# Patient Record
Sex: MALE | Race: WHITE | NOT HISPANIC OR LATINO | Employment: OTHER | URBAN - METROPOLITAN AREA
[De-identification: names, ages, dates, MRNs, and addresses within clinical notes are randomized per-mention and may not be internally consistent; named-entity substitution may affect disease eponyms.]

---

## 2020-08-02 ENCOUNTER — APPOINTMENT (OUTPATIENT)
Dept: GENERAL RADIOLOGY | Facility: HOSPITAL | Age: 70
End: 2020-08-02

## 2020-08-02 ENCOUNTER — HOSPITAL ENCOUNTER (OUTPATIENT)
Facility: HOSPITAL | Age: 70
Setting detail: OBSERVATION
Discharge: HOME | End: 2020-08-03
Attending: EMERGENCY MEDICINE | Admitting: INTERNAL MEDICINE

## 2020-08-02 DIAGNOSIS — R53.1 GENERALIZED WEAKNESS: Primary | ICD-10-CM

## 2020-08-02 DIAGNOSIS — R07.9 CHEST PAIN IN ADULT: ICD-10-CM

## 2020-08-02 DIAGNOSIS — Z86.79 HISTORY OF HYPERTENSION: ICD-10-CM

## 2020-08-02 DIAGNOSIS — E66.9 DIABETES MELLITUS TYPE 2 IN OBESE (HCC): ICD-10-CM

## 2020-08-02 DIAGNOSIS — Z86.79 HISTORY OF CORONARY ARTERY DISEASE: ICD-10-CM

## 2020-08-02 DIAGNOSIS — R06.09 DOE (DYSPNEA ON EXERTION): ICD-10-CM

## 2020-08-02 DIAGNOSIS — E11.69 DIABETES MELLITUS TYPE 2 IN OBESE (HCC): ICD-10-CM

## 2020-08-02 PROBLEM — M54.9 CHRONIC BACK PAIN: Status: ACTIVE | Noted: 2020-08-02

## 2020-08-02 PROBLEM — I25.9 CHEST PAIN DUE TO MYOCARDIAL ISCHEMIA: Status: ACTIVE | Noted: 2020-08-02

## 2020-08-02 PROBLEM — E11.9 DIABETES MELLITUS (HCC): Status: ACTIVE | Noted: 2020-08-02

## 2020-08-02 PROBLEM — I25.10 CORONARY ARTERY DISEASE: Status: ACTIVE | Noted: 2020-08-02

## 2020-08-02 PROBLEM — G89.29 CHRONIC BACK PAIN: Status: ACTIVE | Noted: 2020-08-02

## 2020-08-02 PROBLEM — I10 HYPERTENSION: Status: ACTIVE | Noted: 2020-08-02

## 2020-08-02 PROBLEM — E87.6 HYPOKALEMIA: Status: ACTIVE | Noted: 2020-08-02

## 2020-08-02 LAB
ALBUMIN SERPL-MCNC: 4.2 G/DL (ref 3.5–5.2)
ALBUMIN/GLOB SERPL: 1.5 G/DL
ALP SERPL-CCNC: 102 U/L (ref 39–117)
ALT SERPL W P-5'-P-CCNC: 20 U/L (ref 1–41)
ANION GAP SERPL CALCULATED.3IONS-SCNC: 12 MMOL/L (ref 5–15)
AST SERPL-CCNC: 17 U/L (ref 1–40)
BASOPHILS # BLD AUTO: 0.04 10*3/MM3 (ref 0–0.2)
BASOPHILS NFR BLD AUTO: 0.9 % (ref 0–1.5)
BILIRUB SERPL-MCNC: 0.4 MG/DL (ref 0–1.2)
BILIRUB UR QL STRIP: NEGATIVE
BUN SERPL-MCNC: 8 MG/DL (ref 8–23)
BUN/CREAT SERPL: 8.3 (ref 7–25)
CALCIUM SPEC-SCNC: 8.9 MG/DL (ref 8.6–10.5)
CHLORIDE SERPL-SCNC: 100 MMOL/L (ref 98–107)
CK SERPL-CCNC: 56 U/L (ref 20–200)
CLARITY UR: CLEAR
CO2 SERPL-SCNC: 25 MMOL/L (ref 22–29)
COLOR UR: YELLOW
CREAT SERPL-MCNC: 0.96 MG/DL (ref 0.76–1.27)
CRP SERPL-MCNC: 0.27 MG/DL (ref 0–0.5)
DEPRECATED RDW RBC AUTO: 40.6 FL (ref 37–54)
EOSINOPHIL # BLD AUTO: 0.28 10*3/MM3 (ref 0–0.4)
EOSINOPHIL NFR BLD AUTO: 6.2 % (ref 0.3–6.2)
ERYTHROCYTE [DISTWIDTH] IN BLOOD BY AUTOMATED COUNT: 12.4 % (ref 12.3–15.4)
ERYTHROCYTE [SEDIMENTATION RATE] IN BLOOD: 11 MM/HR (ref 0–20)
GFR SERPL CREATININE-BSD FRML MDRD: 77 ML/MIN/1.73
GLOBULIN UR ELPH-MCNC: 2.8 GM/DL
GLUCOSE BLDC GLUCOMTR-MCNC: 152 MG/DL (ref 70–130)
GLUCOSE BLDC GLUCOMTR-MCNC: 202 MG/DL (ref 70–130)
GLUCOSE SERPL-MCNC: 154 MG/DL (ref 65–99)
GLUCOSE UR STRIP-MCNC: ABNORMAL MG/DL
HBA1C MFR BLD: 7.9 % (ref 4.8–5.6)
HCT VFR BLD AUTO: 45.2 % (ref 37.5–51)
HGB BLD-MCNC: 15.3 G/DL (ref 13–17.7)
HGB UR QL STRIP.AUTO: NEGATIVE
HOLD SPECIMEN: NORMAL
HOLD SPECIMEN: NORMAL
IMM GRANULOCYTES # BLD AUTO: 0.01 10*3/MM3 (ref 0–0.05)
IMM GRANULOCYTES NFR BLD AUTO: 0.2 % (ref 0–0.5)
KETONES UR QL STRIP: NEGATIVE
LEUKOCYTE ESTERASE UR QL STRIP.AUTO: NEGATIVE
LYMPHOCYTES # BLD AUTO: 1.17 10*3/MM3 (ref 0.7–3.1)
LYMPHOCYTES NFR BLD AUTO: 25.9 % (ref 19.6–45.3)
MAGNESIUM SERPL-MCNC: 2.1 MG/DL (ref 1.6–2.4)
MCH RBC QN AUTO: 30.1 PG (ref 26.6–33)
MCHC RBC AUTO-ENTMCNC: 33.8 G/DL (ref 31.5–35.7)
MCV RBC AUTO: 88.8 FL (ref 79–97)
MONOCYTES # BLD AUTO: 0.59 10*3/MM3 (ref 0.1–0.9)
MONOCYTES NFR BLD AUTO: 13.1 % (ref 5–12)
NEUTROPHILS NFR BLD AUTO: 2.43 10*3/MM3 (ref 1.7–7)
NEUTROPHILS NFR BLD AUTO: 53.7 % (ref 42.7–76)
NITRITE UR QL STRIP: NEGATIVE
NRBC BLD AUTO-RTO: 0 /100 WBC (ref 0–0.2)
PH UR STRIP.AUTO: 6 [PH] (ref 5–8)
PLATELET # BLD AUTO: 213 10*3/MM3 (ref 140–450)
PMV BLD AUTO: 10.4 FL (ref 6–12)
POTASSIUM SERPL-SCNC: 3.4 MMOL/L (ref 3.5–5.2)
PROT SERPL-MCNC: 7 G/DL (ref 6–8.5)
PROT UR QL STRIP: NEGATIVE
RBC # BLD AUTO: 5.09 10*6/MM3 (ref 4.14–5.8)
SARS-COV-2 RDRP RESP QL NAA+PROBE: NOT DETECTED
SODIUM SERPL-SCNC: 137 MMOL/L (ref 136–145)
SP GR UR STRIP: <=1.005 (ref 1–1.03)
TROPONIN T SERPL-MCNC: <0.01 NG/ML (ref 0–0.03)
TROPONIN T SERPL-MCNC: <0.01 NG/ML (ref 0–0.03)
UROBILINOGEN UR QL STRIP: ABNORMAL
WBC # BLD AUTO: 4.52 10*3/MM3 (ref 3.4–10.8)
WHOLE BLOOD HOLD SPECIMEN: NORMAL
WHOLE BLOOD HOLD SPECIMEN: NORMAL

## 2020-08-02 PROCEDURE — 94640 AIRWAY INHALATION TREATMENT: CPT

## 2020-08-02 PROCEDURE — 93005 ELECTROCARDIOGRAM TRACING: CPT

## 2020-08-02 PROCEDURE — 99284 EMERGENCY DEPT VISIT MOD MDM: CPT

## 2020-08-02 PROCEDURE — 63710000001 INSULIN DETEMIR PER 5 UNITS: Performed by: NURSE PRACTITIONER

## 2020-08-02 PROCEDURE — G0378 HOSPITAL OBSERVATION PER HR: HCPCS

## 2020-08-02 PROCEDURE — 85025 COMPLETE CBC W/AUTO DIFF WBC: CPT

## 2020-08-02 PROCEDURE — 96360 HYDRATION IV INFUSION INIT: CPT

## 2020-08-02 PROCEDURE — 96372 THER/PROPH/DIAG INJ SC/IM: CPT

## 2020-08-02 PROCEDURE — 80053 COMPREHEN METABOLIC PANEL: CPT

## 2020-08-02 PROCEDURE — 86140 C-REACTIVE PROTEIN: CPT | Performed by: EMERGENCY MEDICINE

## 2020-08-02 PROCEDURE — 25010000002 HEPARIN (PORCINE) PER 1000 UNITS: Performed by: NURSE PRACTITIONER

## 2020-08-02 PROCEDURE — 82550 ASSAY OF CK (CPK): CPT | Performed by: EMERGENCY MEDICINE

## 2020-08-02 PROCEDURE — 71045 X-RAY EXAM CHEST 1 VIEW: CPT

## 2020-08-02 PROCEDURE — 85652 RBC SED RATE AUTOMATED: CPT | Performed by: EMERGENCY MEDICINE

## 2020-08-02 PROCEDURE — 84484 ASSAY OF TROPONIN QUANT: CPT | Performed by: NURSE PRACTITIONER

## 2020-08-02 PROCEDURE — 96361 HYDRATE IV INFUSION ADD-ON: CPT

## 2020-08-02 PROCEDURE — 83735 ASSAY OF MAGNESIUM: CPT

## 2020-08-02 PROCEDURE — 87635 SARS-COV-2 COVID-19 AMP PRB: CPT | Performed by: NURSE PRACTITIONER

## 2020-08-02 PROCEDURE — 94799 UNLISTED PULMONARY SVC/PX: CPT

## 2020-08-02 PROCEDURE — 82962 GLUCOSE BLOOD TEST: CPT

## 2020-08-02 PROCEDURE — 99220 PR INITIAL OBSERVATION CARE/DAY 70 MINUTES: CPT | Performed by: NURSE PRACTITIONER

## 2020-08-02 PROCEDURE — 84484 ASSAY OF TROPONIN QUANT: CPT

## 2020-08-02 PROCEDURE — C9803 HOPD COVID-19 SPEC COLLECT: HCPCS

## 2020-08-02 PROCEDURE — 83036 HEMOGLOBIN GLYCOSYLATED A1C: CPT | Performed by: NURSE PRACTITIONER

## 2020-08-02 PROCEDURE — 81003 URINALYSIS AUTO W/O SCOPE: CPT

## 2020-08-02 RX ORDER — OMEPRAZOLE 40 MG/1
40 CAPSULE, DELAYED RELEASE ORAL DAILY
COMMUNITY
End: 2022-11-30 | Stop reason: HOSPADM

## 2020-08-02 RX ORDER — ACETAMINOPHEN 325 MG/1
650 TABLET ORAL EVERY 6 HOURS PRN
Status: DISCONTINUED | OUTPATIENT
Start: 2020-08-02 | End: 2020-08-03 | Stop reason: HOSPADM

## 2020-08-02 RX ORDER — AMLODIPINE BESYLATE 5 MG/1
10 TABLET ORAL DAILY
Status: DISCONTINUED | OUTPATIENT
Start: 2020-08-02 | End: 2020-08-03 | Stop reason: HOSPADM

## 2020-08-02 RX ORDER — ALBUTEROL SULFATE 90 UG/1
2 AEROSOL, METERED RESPIRATORY (INHALATION) EVERY 4 HOURS PRN
COMMUNITY

## 2020-08-02 RX ORDER — SIMETHICONE 80 MG
80 TABLET,CHEWABLE ORAL EVERY 6 HOURS PRN
Status: DISCONTINUED | OUTPATIENT
Start: 2020-08-02 | End: 2020-08-03 | Stop reason: HOSPADM

## 2020-08-02 RX ORDER — ESCITALOPRAM OXALATE 10 MG/1
20 TABLET ORAL DAILY
Status: DISCONTINUED | OUTPATIENT
Start: 2020-08-02 | End: 2020-08-03 | Stop reason: HOSPADM

## 2020-08-02 RX ORDER — DEXTROSE MONOHYDRATE 25 G/50ML
25 INJECTION, SOLUTION INTRAVENOUS
Status: DISCONTINUED | OUTPATIENT
Start: 2020-08-02 | End: 2020-08-03 | Stop reason: HOSPADM

## 2020-08-02 RX ORDER — LORAZEPAM 2 MG/1
2 TABLET ORAL 2 TIMES DAILY
COMMUNITY

## 2020-08-02 RX ORDER — DOXEPIN HYDROCHLORIDE 100 MG/1
100 CAPSULE ORAL NIGHTLY
Status: ON HOLD | COMMUNITY
End: 2022-11-30

## 2020-08-02 RX ORDER — TAMSULOSIN HYDROCHLORIDE 0.4 MG/1
0.4 CAPSULE ORAL NIGHTLY
Status: DISCONTINUED | OUTPATIENT
Start: 2020-08-02 | End: 2020-08-03 | Stop reason: HOSPADM

## 2020-08-02 RX ORDER — MONTELUKAST SODIUM 10 MG/1
10 TABLET ORAL NIGHTLY
COMMUNITY

## 2020-08-02 RX ORDER — ASPIRIN 81 MG/1
81 TABLET ORAL DAILY
Status: DISCONTINUED | OUTPATIENT
Start: 2020-08-02 | End: 2020-08-03 | Stop reason: HOSPADM

## 2020-08-02 RX ORDER — HEPARIN SODIUM 5000 [USP'U]/ML
5000 INJECTION, SOLUTION INTRAVENOUS; SUBCUTANEOUS EVERY 8 HOURS SCHEDULED
Status: DISCONTINUED | OUTPATIENT
Start: 2020-08-02 | End: 2020-08-03 | Stop reason: HOSPADM

## 2020-08-02 RX ORDER — BUDESONIDE AND FORMOTEROL FUMARATE DIHYDRATE 160; 4.5 UG/1; UG/1
2 AEROSOL RESPIRATORY (INHALATION)
Status: DISCONTINUED | OUTPATIENT
Start: 2020-08-02 | End: 2020-08-03 | Stop reason: HOSPADM

## 2020-08-02 RX ORDER — GABAPENTIN 300 MG/1
300 CAPSULE ORAL 3 TIMES DAILY
COMMUNITY
End: 2021-10-26

## 2020-08-02 RX ORDER — NITROGLYCERIN 0.4 MG/1
0.4 TABLET SUBLINGUAL
Status: DISCONTINUED | OUTPATIENT
Start: 2020-08-02 | End: 2020-08-03 | Stop reason: HOSPADM

## 2020-08-02 RX ORDER — MONTELUKAST SODIUM 10 MG/1
10 TABLET ORAL NIGHTLY
Status: DISCONTINUED | OUTPATIENT
Start: 2020-08-02 | End: 2020-08-03 | Stop reason: HOSPADM

## 2020-08-02 RX ORDER — CETIRIZINE HYDROCHLORIDE 10 MG/1
10 TABLET ORAL DAILY
COMMUNITY
End: 2020-08-02

## 2020-08-02 RX ORDER — LEVOCETIRIZINE DIHYDROCHLORIDE 5 MG/1
5 TABLET, FILM COATED ORAL EVERY EVENING
COMMUNITY
End: 2021-06-14

## 2020-08-02 RX ORDER — SODIUM CHLORIDE 0.9 % (FLUSH) 0.9 %
10 SYRINGE (ML) INJECTION AS NEEDED
Status: DISCONTINUED | OUTPATIENT
Start: 2020-08-02 | End: 2020-08-03 | Stop reason: HOSPADM

## 2020-08-02 RX ORDER — PANTOPRAZOLE SODIUM 40 MG/1
40 TABLET, DELAYED RELEASE ORAL EVERY MORNING
Status: DISCONTINUED | OUTPATIENT
Start: 2020-08-03 | End: 2020-08-03 | Stop reason: HOSPADM

## 2020-08-02 RX ORDER — LISINOPRIL 40 MG/1
40 TABLET ORAL DAILY
COMMUNITY
End: 2022-06-16 | Stop reason: ALTCHOICE

## 2020-08-02 RX ORDER — POTASSIUM CHLORIDE 1.5 G/1.77G
40 POWDER, FOR SOLUTION ORAL AS NEEDED
Status: DISCONTINUED | OUTPATIENT
Start: 2020-08-02 | End: 2020-08-03 | Stop reason: HOSPADM

## 2020-08-02 RX ORDER — NICOTINE POLACRILEX 4 MG
15 LOZENGE BUCCAL
Status: DISCONTINUED | OUTPATIENT
Start: 2020-08-02 | End: 2020-08-03 | Stop reason: HOSPADM

## 2020-08-02 RX ORDER — SODIUM CHLORIDE 9 MG/ML
75 INJECTION, SOLUTION INTRAVENOUS CONTINUOUS
Status: DISCONTINUED | OUTPATIENT
Start: 2020-08-02 | End: 2020-08-03 | Stop reason: HOSPADM

## 2020-08-02 RX ORDER — ASPIRIN 81 MG/1
81 TABLET ORAL DAILY
COMMUNITY

## 2020-08-02 RX ORDER — SODIUM CHLORIDE 0.9 % (FLUSH) 0.9 %
10 SYRINGE (ML) INJECTION EVERY 12 HOURS SCHEDULED
Status: DISCONTINUED | OUTPATIENT
Start: 2020-08-02 | End: 2020-08-03 | Stop reason: HOSPADM

## 2020-08-02 RX ORDER — ALBUTEROL SULFATE 2.5 MG/3ML
2.5 SOLUTION RESPIRATORY (INHALATION) EVERY 4 HOURS PRN
Status: DISCONTINUED | OUTPATIENT
Start: 2020-08-02 | End: 2020-08-03 | Stop reason: HOSPADM

## 2020-08-02 RX ORDER — LORAZEPAM 1 MG/1
2 TABLET ORAL 3 TIMES DAILY
Status: DISCONTINUED | OUTPATIENT
Start: 2020-08-02 | End: 2020-08-03 | Stop reason: HOSPADM

## 2020-08-02 RX ORDER — KETOTIFEN FUMARATE 0.35 MG/ML
1 SOLUTION/ DROPS OPHTHALMIC 2 TIMES DAILY
COMMUNITY

## 2020-08-02 RX ORDER — ESCITALOPRAM OXALATE 20 MG/1
20 TABLET ORAL DAILY
COMMUNITY

## 2020-08-02 RX ORDER — CLOPIDOGREL BISULFATE 75 MG/1
75 TABLET ORAL DAILY
COMMUNITY
End: 2021-09-22 | Stop reason: SDUPTHER

## 2020-08-02 RX ORDER — NITROGLYCERIN 0.4 MG/1
0.4 TABLET SUBLINGUAL
COMMUNITY

## 2020-08-02 RX ORDER — CETIRIZINE HYDROCHLORIDE 10 MG/1
10 TABLET ORAL DAILY
Status: DISCONTINUED | OUTPATIENT
Start: 2020-08-02 | End: 2020-08-03 | Stop reason: HOSPADM

## 2020-08-02 RX ORDER — DOCUSATE SODIUM 250 MG
250 CAPSULE ORAL DAILY PRN
COMMUNITY
End: 2020-12-30

## 2020-08-02 RX ORDER — KETOTIFEN FUMARATE 0.35 MG/ML
1 SOLUTION/ DROPS OPHTHALMIC 2 TIMES DAILY
Status: DISCONTINUED | OUTPATIENT
Start: 2020-08-02 | End: 2020-08-03 | Stop reason: HOSPADM

## 2020-08-02 RX ORDER — TAMSULOSIN HYDROCHLORIDE 0.4 MG/1
1 CAPSULE ORAL DAILY
COMMUNITY
End: 2021-09-22 | Stop reason: SDUPTHER

## 2020-08-02 RX ORDER — TRIAMCINOLONE ACETONIDE 1 MG/G
CREAM TOPICAL 2 TIMES DAILY
Status: ON HOLD | COMMUNITY
End: 2022-11-30

## 2020-08-02 RX ORDER — DOXEPIN HYDROCHLORIDE 25 MG/1
100 CAPSULE ORAL NIGHTLY
Status: DISCONTINUED | OUTPATIENT
Start: 2020-08-02 | End: 2020-08-03 | Stop reason: HOSPADM

## 2020-08-02 RX ORDER — LISINOPRIL 40 MG/1
40 TABLET ORAL DAILY
Status: DISCONTINUED | OUTPATIENT
Start: 2020-08-02 | End: 2020-08-03 | Stop reason: HOSPADM

## 2020-08-02 RX ORDER — ALBUTEROL SULFATE 90 UG/1
2 AEROSOL, METERED RESPIRATORY (INHALATION) EVERY 4 HOURS PRN
Status: DISCONTINUED | OUTPATIENT
Start: 2020-08-02 | End: 2020-08-02

## 2020-08-02 RX ORDER — DOCUSATE SODIUM 100 MG/1
100 CAPSULE, LIQUID FILLED ORAL DAILY PRN
Status: DISCONTINUED | OUTPATIENT
Start: 2020-08-02 | End: 2020-08-03 | Stop reason: HOSPADM

## 2020-08-02 RX ORDER — BUDESONIDE AND FORMOTEROL FUMARATE DIHYDRATE 160; 4.5 UG/1; UG/1
2 AEROSOL RESPIRATORY (INHALATION)
COMMUNITY
End: 2021-09-22 | Stop reason: SDUPTHER

## 2020-08-02 RX ORDER — SIMETHICONE 80 MG
80 TABLET,CHEWABLE ORAL EVERY 6 HOURS PRN
Status: ON HOLD | COMMUNITY
End: 2022-11-30

## 2020-08-02 RX ORDER — TADALAFIL 20 MG/1
20 TABLET ORAL DAILY PRN
COMMUNITY
End: 2020-12-28 | Stop reason: SDUPTHER

## 2020-08-02 RX ORDER — GABAPENTIN 300 MG/1
300 CAPSULE ORAL 3 TIMES DAILY
Status: DISCONTINUED | OUTPATIENT
Start: 2020-08-02 | End: 2020-08-03 | Stop reason: HOSPADM

## 2020-08-02 RX ORDER — POTASSIUM CHLORIDE 750 MG/1
40 CAPSULE, EXTENDED RELEASE ORAL AS NEEDED
Status: DISCONTINUED | OUTPATIENT
Start: 2020-08-02 | End: 2020-08-03 | Stop reason: HOSPADM

## 2020-08-02 RX ORDER — CELECOXIB 100 MG/1
75 CAPSULE ORAL DAILY
COMMUNITY
End: 2021-04-05

## 2020-08-02 RX ORDER — AMLODIPINE BESYLATE 10 MG/1
10 TABLET ORAL DAILY
COMMUNITY
End: 2022-05-26 | Stop reason: ALTCHOICE

## 2020-08-02 RX ORDER — CLOPIDOGREL BISULFATE 75 MG/1
75 TABLET ORAL DAILY
Status: DISCONTINUED | OUTPATIENT
Start: 2020-08-02 | End: 2020-08-03 | Stop reason: HOSPADM

## 2020-08-02 RX ADMIN — METOPROLOL TARTRATE 25 MG: 25 TABLET, FILM COATED ORAL at 21:02

## 2020-08-02 RX ADMIN — TAMSULOSIN HYDROCHLORIDE 0.4 MG: 0.4 CAPSULE ORAL at 21:02

## 2020-08-02 RX ADMIN — BUDESONIDE AND FORMOTEROL FUMARATE DIHYDRATE 2 PUFF: 160; 4.5 AEROSOL RESPIRATORY (INHALATION) at 19:37

## 2020-08-02 RX ADMIN — LISINOPRIL 40 MG: 40 TABLET ORAL at 21:02

## 2020-08-02 RX ADMIN — LORAZEPAM 2 MG: 1 TABLET ORAL at 21:02

## 2020-08-02 RX ADMIN — SODIUM CHLORIDE 75 ML/HR: 9 INJECTION, SOLUTION INTRAVENOUS at 18:18

## 2020-08-02 RX ADMIN — GABAPENTIN 300 MG: 300 CAPSULE ORAL at 21:03

## 2020-08-02 RX ADMIN — DOXEPIN HYDROCHLORIDE 100 MG: 25 CAPSULE ORAL at 21:02

## 2020-08-02 RX ADMIN — CLOPIDOGREL BISULFATE 75 MG: 75 TABLET ORAL at 21:02

## 2020-08-02 RX ADMIN — KETOTIFEN FUMARATE 1 DROP: 0.35 SOLUTION/ DROPS OPHTHALMIC at 21:03

## 2020-08-02 RX ADMIN — HEPARIN SODIUM 5000 UNITS: 5000 INJECTION INTRAVENOUS; SUBCUTANEOUS at 21:03

## 2020-08-02 RX ADMIN — SODIUM CHLORIDE, PRESERVATIVE FREE 10 ML: 5 INJECTION INTRAVENOUS at 21:03

## 2020-08-02 RX ADMIN — CETIRIZINE HYDROCHLORIDE 10 MG: 10 TABLET, FILM COATED ORAL at 21:02

## 2020-08-02 RX ADMIN — MONTELUKAST SODIUM 10 MG: 10 TABLET, COATED ORAL at 21:02

## 2020-08-02 RX ADMIN — ACETAMINOPHEN 650 MG: 325 TABLET, FILM COATED ORAL at 16:40

## 2020-08-02 RX ADMIN — INSULIN DETEMIR 15 UNITS: 100 INJECTION, SOLUTION SUBCUTANEOUS at 21:03

## 2020-08-02 RX ADMIN — POTASSIUM CHLORIDE 40 MEQ: 10 CAPSULE, COATED, EXTENDED RELEASE ORAL at 21:40

## 2020-08-02 NOTE — H&P
[facility]  HISTORY AND PHYSICAL    Patient Name: Panda Henry  : 1950  MRN: [id number]  Primary Care Physician: Alan Foy DO  Date of admission: 2020      Subjective   Subjective     Chief Complaint:  Chest pain  Weakness    HPI:  Panda Henry is a 70 y.o. male who presented to the ED with 2 weeks of progressive weakness and fatigue in upper and lower extremities.  He was so weak today that he was unable to get out of bathtub.  He also complains of chest pain with UREÑA.  He has history of CAD with stent placement 2019.      Review of Systems   Constitutional: Positive for activity change and fatigue. Negative for appetite change and fever.   HENT: Negative for hearing loss.    Eyes: Negative for visual disturbance.   Respiratory: Positive for shortness of breath.    Cardiovascular: Positive for chest pain. Negative for leg swelling.   Gastrointestinal: Negative for abdominal pain, constipation, diarrhea, nausea and vomiting.   Genitourinary: Negative for dysuria and hematuria.   Musculoskeletal: Positive for arthralgias, back pain and myalgias. Negative for joint swelling.   Skin: Negative for wound.   Neurological: Positive for weakness and headaches. Negative for dizziness and syncope.   Psychiatric/Behavioral: Negative for confusion.     All other systems reviewed and are negative.     Personal History     Past Medical History:   Diagnosis Date   • Cancer (CMS/HCC)     basal cell carcinoma   • COPD (chronic obstructive pulmonary disease) (CMS/HCC)    • Coronary artery disease    • Depression    • Diabetes mellitus (CMS/HCC)    • GERD (gastroesophageal reflux disease)    • Hyperlipidemia    • Hypertension    • Injury of back    Cardiac Stent    Past Surgical History:   Procedure Laterality Date   • BLEPHAROPLASTY Bilateral    • CARDIAC CATHETERIZATION     • SHOULDER ROTATOR CUFF REPAIR Right    • SINUPLASTY     • SKIN BIOPSY         Family  History: family history includes Alcohol abuse in his brother; Aneurysm in his brother; Cancer in his brother; Coronary artery disease in his brother, father, and mother; Diabetes in his mother; Heart failure in his sister; Lung cancer in his father; Stroke in his mother. Otherwise pertinent FHx was reviewed and unremarkable.     Social History:  reports that he has never smoked. His smokeless tobacco use includes chew. He reports that he does not drink alcohol or use drugs.  Social History     Social History Narrative    Live in [facility] [place] with wife and grandson    Retired        Medications:    Current Facility-Administered Medications:   •  acetaminophen (TYLENOL) tablet 650 mg, 650 mg, Oral, Q6H PRN, [place], Thalia, APRN, 650 mg at 08/02/20 1640  •  sodium chloride 0.9 % flush 10 mL, 10 mL, Intravenous, PRN, Emergency, Triage Protocol, MD    Current Outpatient Medications:   •  aspirin 81 MG EC tablet, Take 81 mg by mouth Daily., Disp: , Rfl:   •  budesonide-formoterol (SYMBICORT) 160-4.5 MCG/ACT inhaler, Inhale 2 puffs 2 (Two) Times a Day., Disp: , Rfl:   •  celecoxib (CeleBREX) 100 MG capsule, Take 75 mg by mouth 2 (Two) Times a Day As Needed for Mild Pain ., Disp: , Rfl:   •  clopidogrel (PLAVIX) 75 MG tablet, Take 75 mg by mouth Daily., Disp: , Rfl:   •  docusate sodium (COLACE) 250 MG capsule, Take 250 mg by mouth Daily As Needed., Disp: , Rfl:   •  doxepin (SINEquan) 100 MG capsule, Take 100 mg by mouth Every Night., Disp: , Rfl:   •  gabapentin (NEURONTIN) 300 MG capsule, Take 300 mg by mouth 3 (Three) Times a Day., Disp: , Rfl:   •  levocetirizine (XYZAL) 5 MG tablet, Take 5 mg by mouth Every Evening., Disp: , Rfl:   •  LORazepam (ATIVAN) 2 MG tablet, Take 2 mg by mouth 3 (Three) Times a Day., Disp: , Rfl:   •  lovastatin (ALTOPREV) 40 MG 24 hr tablet, Take 40 mg by mouth Every Night., Disp: , Rfl:   •  metoprolol tartrate (LOPRESSOR) 25 MG tablet, Take 25 mg by mouth 2 (Two) Times  a Day., Disp: , Rfl:   •  montelukast (SINGULAIR) 10 MG tablet, Take 10 mg by mouth Every Night., Disp: , Rfl:   •  omeprazole (priLOSEC) 40 MG capsule, Take 40 mg by mouth Daily., Disp: , Rfl:   •  simethicone (MYLICON) 80 MG chewable tablet, Chew 80 mg Every 6 (Six) Hours As Needed for Flatulence., Disp: , Rfl:   •  tamsulosin (FLOMAX) 0.4 MG capsule 24 hr capsule, Take 1 capsule by mouth Daily., Disp: , Rfl:   •  albuterol sulfate  (90 Base) MCG/ACT inhaler, Inhale 2 puffs Every 4 (Four) Hours As Needed for Wheezing., Disp: , Rfl:   •  amLODIPine (NORVASC) 10 MG tablet, Take 10 mg by mouth Daily., Disp: , Rfl:   •  escitalopram (LEXAPRO) 20 MG tablet, Take 20 mg by mouth Daily., Disp: , Rfl:   •  insulin NPH-insulin regular (humuLIN 70/30,novoLIN 70/30) (70-30) 100 UNIT/ML injection, Inject  under the skin into the appropriate area as directed 2 (Two) Times a Day With Meals., Disp: , Rfl:   •  ketotifen (ZADITOR) 0.025 % ophthalmic solution, 1 drop 2 (Two) Times a Day., Disp: , Rfl:   •  lisinopril (PRINIVIL,ZESTRIL) 40 MG tablet, Take 40 mg by mouth Daily., Disp: , Rfl:   •  nitroglycerin (NITROSTAT) 0.4 MG SL tablet, Place 0.4 mg under the tongue Every 5 (Five) Minutes As Needed for Chest Pain. Take no more than 3 doses in 15 minutes., Disp: , Rfl:   •  tadalafil (ADCIRCA) 20 MG tablet tablet, Take 20 mg by mouth Daily As Needed., Disp: , Rfl:   •  triamcinolone (KENALOG) 0.1 % cream, Apply  topically to the appropriate area as directed 2 (Two) Times a Day., Disp: , Rfl:     No Known Allergies    Objective   Objective     Vital Signs:   Temp:  [99.1 °F (37.3 °C)] 99.1 °F (37.3 °C)  Heart Rate:  [68-87] 82  Resp:  [16] 16  BP: (110-177)/(71-94) 141/71        Physical Exam   Constitutional: Awake, alert  Eyes: PERRLA, sclerae anicteric, no conjunctival injection  HENT: NCAT, mucous membranes moist  Neck: Supple, no thyromegaly, no lymphadenopathy, trachea midline  Respiratory: Clear to auscultation  bilaterally, nonlabored respirations   Cardiovascular: RRR, no murmurs, rubs, or gallops, palpable pedal pulses bilaterally  Gastrointestinal: Positive bowel sounds, soft, nontender, nondistended, obese  Musculoskeletal: No bilateral ankle edema, no clubbing or cyanosis to extremities  Psychiatric: Appropriate affect, cooperative  Neurologic: Oriented x 3, GUO, equal strength, speech clear  Skin: No rashes noted      Results Reviewed:  I have personally reviewed current lab and radiology data.    Results from last 7 days   Lab Units 08/02/20  1256   WBC 10*3/mm3 4.52   HEMOGLOBIN g/dL 15.3   HEMATOCRIT % 45.2   PLATELETS 10*3/mm3 213     Results from last 7 days   Lab Units 08/02/20  1256   SODIUM mmol/L 137   POTASSIUM mmol/L 3.4*   CHLORIDE mmol/L 100   CO2 mmol/L 25.0   BUN mg/dL 8   CREATININE mg/dL 0.96   GLUCOSE mg/dL 154*   CALCIUM mg/dL 8.9   ALT (SGPT) U/L 20   AST (SGOT) U/L 17   TROPONIN T ng/mL <0.010     Estimated Creatinine Clearance: 95.9 mL/min (by C-G formula based on SCr of 0.96 mg/dL).  Brief Urine Lab Results  (Last result in the past 365 days)      Color   Clarity   Blood   Leuk Est   Nitrite   Protein   CREAT   Urine HCG        08/02/20 1428 Yellow Clear Negative Negative Negative Negative             Imaging Results (Last 24 Hours)     Procedure Component Value Units Date/Time    XR Chest 1 View [[id number]] Collected:  08/02/20 1316     Updated:  08/02/20 1544    Narrative:          EXAMINATION: XR CHEST 1 VW - 08/02/2020     INDICATION: Weakness, dizziness, altered mental status.     COMPARISON: NONE     FINDINGS: Portable chest reveals cardiac and mediastinal silhouettes  within normal limits. The lung fields are clear. No focal parenchymal  opacification is present. No pleural effusion or pneumothorax. The bony  structures are unremarkable. The pulmonary vascularity is within normal  limits.         Impression:       No acute cardiopulmonary disease.     DICTATED:   08/02/2020  EDITED/ls :    08/02/2020                 Assessment/Plan   Assessment & Plan     Active Hospital Problems    Diagnosis POA   • **Generalized weakness [R53.1] Yes   • Chest pain [R07.9] Yes   • Hypertension [I10] Yes   • Coronary artery disease [I25.10] Yes   • Diabetes mellitus (CMS/HCC) [E11.9] Yes   • Chronic back pain [M54.9, G89.29] Yes   • Hypokalemia [E87.6] Yes     Chest pain  Coronary artery disease  --lexiscan in am, COVID screen pending  --trend troponin  --EKG in am  --continue asa/plavix  --cards if needed (primary cards Dr. Foy)  Generalized weakness  --ESR/CRP negative  --PT/OT  Hypokalemia  --replace per protocol  Chronic back pain  HTN  --continue ace, norvasc, BB  T2DM with neuropathy  --check A1C  --basal/bolus insulin    DVT prophylaxis:  SQ heparin      CODE STATUS:   Code Status and Medical Interventions:   Ordered at: 08/02/20 5002     Code Status:    CPR     Medical Interventions (Level of Support Prior to Arrest):    Full     Admission Status:  I believe this patient meets OBSERVATION status, however if further evaluation or treatment plans warrant, status may change.  Based upon current information, I predict patient's care encounter to be less than or equal to 2 midnights.    Electronically signed by ZACKARY Baires, 08/02/20, 4:56 PM.      Attending   Admission Attestation       I have seen and examined the patient, performing an independent face-to-face diagnostic evaluation with plan of care reviewed and developed with the advanced practice clinician (APC).      Brief Summary Statement:   Panda Henry is a 70 y.o. male with history of CAD with stent, DM, obesity, HTN, bilateral shoulder and neck pain who presented with chest pain in the center of his chest.  This was not associated with nausea, vomiting, radiation, or diaphoresis.  He has been having bilateral shoulder pains and has been seeing Dr. Paul Avila[facility] in [place], [place] for injections.      He has a Cardiac Stent  Placed by Dr. Alan Foy ([facility] over 1 year ago.    Remainder of detailed HPI is as noted by APC and has been reviewed and/or edited by me for completeness.    Attending Physical Exam:    Constitutional: Awake, alert  Eyes: PERRLA, sclerae anicteric, no conjunctival injection  HENT: NCAT, mucous membranes moist  Neck: Supple, no thyromegaly, no lymphadenopathy, trachea midline  Respiratory: Clear to auscultation bilaterally, nonlabored respirations   Cardiovascular: RRR, no murmurs, rubs, or gallops, palpable pedal pulses bilaterally  Gastrointestinal: Positive bowel sounds, soft, nontender, nondistended  Musculoskeletal: No bilateral ankle edema, no clubbing or cyanosis to extremities  Psychiatric: Appropriate affect, cooperative  Neurologic: Oriented x 3, strength symmetric in all extremities, Cranial Nerves grossly intact to confrontation, speech clear  Skin: No rashes    Brief Assessment/Plan :  See detailed assessment and plan developed with APC which I have reviewed and/or edited for completeness.    Electronically signed by Grant Carrasquillo MD, 08/02/20, 10:25 PM.

## 2020-08-02 NOTE — ED PROVIDER NOTES
Subjective   The patient presents to the emergency department with 2 weeks of progressively worsening fatigue and generalized weakness.  Patient also reports joint pain involving the ankles, knees, and shoulders.  Patient reports some dyspnea on exertion which has been getting progressively worse as well as some aching substernal chest pain.  The patient does have a prior history of coronary artery disease with stent placed approximately 18 months ago.  The patient reports that he has had chronic fatigue, but nothing compared to what this is now.  He reports that he presented to the emergency department today because he could hardly get out of the bathtub and when he did he was extremely short of breath.  No known COVID exposure or symptoms per the patient.      History provided by:  Patient      Review of Systems   Constitutional: Positive for fatigue. Negative for chills and fever.   Respiratory: Positive for shortness of breath.    Cardiovascular: Positive for chest pain.   Genitourinary: Negative.    Musculoskeletal: Positive for arthralgias.   Neurological: Positive for weakness.   Psychiatric/Behavioral: Negative.    All other systems reviewed and are negative.      Past Medical History:   Diagnosis Date   • Cancer (CMS/HCC)     basal cell carcinoma   • COPD (chronic obstructive pulmonary disease) (CMS/HCC)    • Coronary artery disease    • Depression    • Diabetes mellitus (CMS/HCC)    • GERD (gastroesophageal reflux disease)    • Hyperlipidemia    • Hypertension    • Injury of back        No Known Allergies    Past Surgical History:   Procedure Laterality Date   • BLEPHAROPLASTY Bilateral    • CARDIAC CATHETERIZATION     • SHOULDER ROTATOR CUFF REPAIR Right    • SINUPLASTY     • SKIN BIOPSY         Family History   Problem Relation Age of Onset   • Diabetes Mother    • Stroke Mother    • Coronary artery disease Mother    • Lung cancer Father    • Coronary artery disease Father    • Heart failure Sister    •  Coronary artery disease Brother    • Aneurysm Brother         brain   • Cancer Brother         unknown location   • Alcohol abuse Brother        Social History     Socioeconomic History   • Marital status:      Spouse name: Not on file   • Number of children: Not on file   • Years of education: Not on file   • Highest education level: Not on file   Tobacco Use   • Smoking status: Never Smoker   • Smokeless tobacco: Current User     Types: Chew   Substance and Sexual Activity   • Alcohol use: Never     Frequency: Never   • Drug use: Never   • Sexual activity: Defer   Social History Narrative    Live in [facility] with wife and grandson    Retired            Objective   Physical Exam   Constitutional: He is oriented to person, place, and time. He appears well-developed and well-nourished.   HENT:   Head: Normocephalic and atraumatic.   Eyes: Pupils are equal, round, and reactive to light.   Cardiovascular: Normal rate, regular rhythm, normal heart sounds and intact distal pulses.   Pulmonary/Chest: Effort normal and breath sounds normal. No respiratory distress.   Abdominal: Soft. Bowel sounds are normal. There is no tenderness. There is no guarding.   Musculoskeletal: Normal range of motion. He exhibits no edema or tenderness.   Neurological: He is alert and oriented to person, place, and time.   Skin: Skin is warm and dry. Capillary refill takes less than 2 seconds.   Psychiatric: He has a normal mood and affect. His behavior is normal.   Nursing note and vitals reviewed.      Procedures           ED Course  ED Course as of Aug 02 1645   Sun Aug 02, 2020   1333 Personally reviewed the single view of the chest demonstrates no acute or emergent findings.  No focal infiltrate.  No free air.  See radiology report for details.   XR Chest 1 View [RS]   1333 Troponin T: <0.010 [RS]   1448 Patient is resting comfortably.  No clear etiology for symptoms.  However with his past medical history  and other risk factors, this is concerning as a potential anginal equivalent.  I talked with the patient about the differential and options for planning.  After consideration, the patient is agreeable to staying overnight for further evaluation and management.    [RS]   9592 Hospitalist paged for admission.    [RS]   6138 Case discussed with Dr. Carrasquillo who agrees with the plan to admit.    [RS]      ED Course User Index  [RS] Robson Wang MD                                           MDM  Number of Diagnoses or Management Options  Chest pain in adult:   Diabetes mellitus type 2 in obese (CMS/HCC):   UREÑA (dyspnea on exertion):   Generalized weakness:   History of coronary artery disease:   History of hypertension:   Diagnosis management comments: Recent Results (from the past 24 hour(s))  -Comprehensive Metabolic Panel  Collection Time: 08/02/20 12:56 PM       Result                      Value             Ref Range           Glucose                     154 (H)           65 - 99 mg/dL       BUN                         8                 8 - 23 mg/dL        Creatinine                  0.96              0.76 - 1.27 *       Sodium                      137               136 - 145 mm*       Potassium                   3.4 (L)           3.5 - 5.2 mm*       Chloride                    100               98 - 107 mmo*       CO2                         25.0              22.0 - 29.0 *       Calcium                     8.9               8.6 - 10.5 m*       Total Protein               7.0               6.0 - 8.5 g/*       Albumin                     4.20              3.50 - 5.20 *       ALT (SGPT)                  20                1 - 41 U/L          AST (SGOT)                  17                1 - 40 U/L          Alkaline Phosphatase        102               39 - 117 U/L        Total Bilirubin             0.4               0.0 - 1.2 mg*       eGFR Non  Amer       77                >60 mL/min/1*       Globulin                     2.8               gm/dL               A/G Ratio                   1.5               g/dL                BUN/Creatinine Ratio        8.3               7.0 - 25.0          Anion Gap                   12.0              5.0 - 15.0 m*  -Troponin  Collection Time: 08/02/20 12:56 PM       Result                      Value             Ref Range           Troponin T                  <0.010            0.000 - 0.03*  -Magnesium  Collection Time: 08/02/20 12:56 PM       Result                      Value             Ref Range           Magnesium                   2.1               1.6 - 2.4 mg*  -Green Top (Gel)  Collection Time: 08/02/20 12:56 PM       Result                      Value             Ref Range           Extra Tube                                                    Hold for add-ons.  -Lavender Top  Collection Time: 08/02/20 12:56 PM       Result                      Value             Ref Range           Extra Tube                                                    hold for add-on  -Gold Top - SST  Collection Time: 08/02/20 12:56 PM       Result                      Value             Ref Range           Extra Tube                                                    Hold for add-ons.  -CBC Auto Differential  Collection Time: 08/02/20 12:56 PM       Result                      Value             Ref Range           WBC                         4.52              3.40 - 10.80*       RBC                         5.09              4.14 - 5.80 *       Hemoglobin                  15.3              13.0 - 17.7 *       Hematocrit                  45.2              37.5 - 51.0 %       MCV                         88.8              79.0 - 97.0 *       MCH                         30.1              26.6 - 33.0 *       MCHC                        33.8              31.5 - 35.7 *       RDW                         12.4              12.3 - 15.4 %       RDW-SD                      40.6              37.0 - 54.0 *       MPV                          10.4              6.0 - 12.0 fL       Platelets                   213               140 - 450 10*       Neutrophil %                53.7              42.7 - 76.0 %       Lymphocyte %                25.9              19.6 - 45.3 %       Monocyte %                  13.1 (H)          5.0 - 12.0 %        Eosinophil %                6.2               0.3 - 6.2 %         Basophil %                  0.9               0.0 - 1.5 %         Immature Grans %            0.2               0.0 - 0.5 %         Neutrophils, Absolute       2.43              1.70 - 7.00 *       Lymphocytes, Absolute       1.17              0.70 - 3.10 *       Monocytes, Absolute         0.59              0.10 - 0.90 *       Eosinophils, Absolute       0.28              0.00 - 0.40 *       Basophils, Absolute         0.04              0.00 - 0.20 *       Immature Grans, Absolu*     0.01              0.00 - 0.05 *       nRBC                        0.0               0.0 - 0.2 /1*  -CK  Collection Time: 08/02/20 12:56 PM       Result                      Value             Ref Range           Creatine Kinase             56                20 - 200 U/L   -C-reactive Protein  Collection Time: 08/02/20 12:56 PM       Result                      Value             Ref Range           C-Reactive Protein          0.27              0.00 - 0.50 *  -Sedimentation Rate  Collection Time: 08/02/20 12:56 PM       Result                      Value             Ref Range           Sed Rate                    11                0 - 20 mm/hr   -Light Blue Top  Collection Time: 08/02/20 12:58 PM       Result                      Value             Ref Range           Extra Tube                                                    hold for add-on  -Urinalysis With Microscopic If Indicated (No Culture) - Urine, Clean Catch  Collection Time: 08/02/20  2:28 PM       Result                      Value             Ref Range           Color, UA                   Yellow             Yellow, Straw       Appearance, UA              Clear             Clear               pH, UA                      6.0               5.0 - 8.0           Specific Gravity, UA        <=1.005           1.001 - 1.030       Glucose, UA                                   Negative        250 mg/dL (1+) (A)       Ketones, UA                 Negative          Negative            Bilirubin, UA               Negative          Negative            Blood, UA                   Negative          Negative            Protein, UA                 Negative          Negative            Leuk Esterase, UA           Negative          Negative            Nitrite, UA                 Negative          Negative            Urobilinogen, UA            0.2 E.U./dL       0.2 - 1.0 E.*  Note: In addition to lab results from this visit, the labs listed above may include labs taken at another facility or during a different encounter within the last 24 hours. Please correlate lab times with ED admission and discharge times for further clarification of the services performed during this visit.    XR Chest 1 View   Preliminary Result    No acute cardiopulmonary disease.               -----------------------------------------------------            08/02/20 08/02/20 08/02/20 08/02/20               1430      1431      1432      1500     -----------------------------------------------------   BP:       154/76    154/76    110/87    167/75     Patient Position:                     Standing              Pulse:      75                  82                 Resp:                                              Temp:                                              TempSrc:                                           SpO2:                95%                 97%       Weight:                                            Height:                                            -----------------------------------------------------  Medications  sodium chloride 0.9 % flush 10 mL (has no administration in time range)  ECG/EMG Results (last 24 hours)     Procedure Component Value Units Date/Time    ECG 12 Lead ([id number]) Collected:  08/02/20 1247     Updated:  08/02/20 1250    Narrative:       Test Reason : Weak/Dizzy/AMS protocol  Blood Pressure : **/** mmHG  Vent. Rate : 082 BPM     Atrial Rate : 082 BPM     P-R Int : 160 ms          QRS Dur : 082 ms      QT Int : 378 ms       P-R-T Axes : 059 048 092 degrees     QTc Int : 441 ms    Normal sinus rhythm  Possible Left atrial enlargement  Nonspecific T wave abnormality  Abnormal ECG  No previous ECGs available  Nonspecific latera ST-depression  Confirmed by PERLA LOVE MD ([id number]) on 8/2/2020 12:50:07 PM    Referred By:  EDMD           Confirmed By:PERLA LOVE MD      ECG 12 Lead   Final Result    Test Reason : Weak/Dizzy/AMS protocol    Blood Pressure : **/** mmHG    Vent. Rate : 082 BPM     Atrial Rate : 082 BPM       P-R Int : 160 ms          QRS Dur : 082 ms        QT Int : 378 ms       P-R-T Axes : 059 048 092 degrees       QTc Int : 441 ms        Normal sinus rhythm    Possible Left atrial enlargement    Nonspecific T wave abnormality    Abnormal ECG    No previous ECGs available    Nonspecific latera ST-depression    Confirmed by PERLA LOVE MD ([id number]) on 8/2/2020 12:50:07 PM        Referred By:  EDMD           Confirmed By:PERLA LOVE MD            Amount and/or Complexity of Data Reviewed  Clinical lab tests: reviewed  Tests in the radiology section of CPT®: reviewed  Review and summarize past medical records: yes  Discuss the patient with other providers: yes  Independent visualization of images, tracings, or specimens: yes        Final diagnoses:   Generalized weakness   Chest pain in adult   UREÑA (dyspnea on exertion)   History of coronary artery disease   History of hypertension   Diabetes mellitus type 2 in obese  (CMS/[facility])            Robson Wang MD  08/02/20 1518       Robson Wang MD  08/02/20 4149

## 2020-08-02 NOTE — PLAN OF CARE
Problem: Patient Care Overview  Goal: Plan of Care Review  Flowsheets  Taken 8/2/2020 1741  Progress: no change  Outcome Summary: Pt admit from ED with weakness and dyspnea. On room air. NSR per tele. NS at 75 ml/hour. NPO after midnight for Stress Test. Will continue to monitor.  Taken 8/2/2020 6430  Plan of Care Reviewed With: patient

## 2020-08-03 ENCOUNTER — APPOINTMENT (OUTPATIENT)
Dept: CARDIOLOGY | Facility: HOSPITAL | Age: 70
End: 2020-08-03

## 2020-08-03 VITALS
RESPIRATION RATE: 16 BRPM | HEART RATE: 61 BPM | OXYGEN SATURATION: 97 % | BODY MASS INDEX: 30.15 KG/M2 | WEIGHT: 242.51 LBS | HEIGHT: 75 IN | SYSTOLIC BLOOD PRESSURE: 150 MMHG | DIASTOLIC BLOOD PRESSURE: 74 MMHG | TEMPERATURE: 95.5 F

## 2020-08-03 LAB
ANION GAP SERPL CALCULATED.3IONS-SCNC: 9 MMOL/L (ref 5–15)
BASOPHILS # BLD AUTO: 0.04 10*3/MM3 (ref 0–0.2)
BASOPHILS NFR BLD AUTO: 1 % (ref 0–1.5)
BH CV NUCLEAR PRIOR STUDY: 3
BH CV STRESS BP STAGE 1: NORMAL
BH CV STRESS BP STAGE 2: NORMAL
BH CV STRESS BP STAGE 4: NORMAL
BH CV STRESS COMMENTS STAGE 1: NORMAL
BH CV STRESS DOSE REGADENOSON STAGE 1: 0.4
BH CV STRESS DURATION MIN STAGE 1: 1
BH CV STRESS DURATION MIN STAGE 2: 1
BH CV STRESS DURATION MIN STAGE 3: 1
BH CV STRESS DURATION MIN STAGE 4: 1
BH CV STRESS DURATION SEC STAGE 1: 0
BH CV STRESS DURATION SEC STAGE 2: 0
BH CV STRESS DURATION SEC STAGE 3: 0
BH CV STRESS DURATION SEC STAGE 4: 0
BH CV STRESS HR STAGE 1: 66
BH CV STRESS HR STAGE 2: 76
BH CV STRESS HR STAGE 3: 72
BH CV STRESS HR STAGE 4: 70
BH CV STRESS O2 STAGE 1: 98
BH CV STRESS O2 STAGE 2: 99
BH CV STRESS O2 STAGE 3: 99
BH CV STRESS O2 STAGE 4: 98
BH CV STRESS PROTOCOL 1: NORMAL
BH CV STRESS RECOVERY BP: NORMAL MMHG
BH CV STRESS RECOVERY HR: 64 BPM
BH CV STRESS RECOVERY O2: 97 %
BH CV STRESS STAGE 1: 1
BH CV STRESS STAGE 2: 2
BH CV STRESS STAGE 3: 3
BH CV STRESS STAGE 4: 4
BUN SERPL-MCNC: 8 MG/DL (ref 8–23)
BUN/CREAT SERPL: 8.9 (ref 7–25)
CALCIUM SPEC-SCNC: 8.8 MG/DL (ref 8.6–10.5)
CHLORIDE SERPL-SCNC: 107 MMOL/L (ref 98–107)
CHOLEST SERPL-MCNC: 139 MG/DL (ref 0–200)
CK SERPL-CCNC: 45 U/L (ref 20–200)
CO2 SERPL-SCNC: 25 MMOL/L (ref 22–29)
CREAT SERPL-MCNC: 0.9 MG/DL (ref 0.76–1.27)
DEPRECATED RDW RBC AUTO: 42.4 FL (ref 37–54)
EOSINOPHIL # BLD AUTO: 0.28 10*3/MM3 (ref 0–0.4)
EOSINOPHIL NFR BLD AUTO: 7.2 % (ref 0.3–6.2)
ERYTHROCYTE [DISTWIDTH] IN BLOOD BY AUTOMATED COUNT: 12.8 % (ref 12.3–15.4)
GFR SERPL CREATININE-BSD FRML MDRD: 83 ML/MIN/1.73
GLUCOSE BLDC GLUCOMTR-MCNC: 119 MG/DL (ref 70–130)
GLUCOSE BLDC GLUCOMTR-MCNC: 150 MG/DL (ref 70–130)
GLUCOSE SERPL-MCNC: 168 MG/DL (ref 65–99)
HCT VFR BLD AUTO: 45.2 % (ref 37.5–51)
HDLC SERPL-MCNC: 30 MG/DL (ref 40–60)
HGB BLD-MCNC: 14.7 G/DL (ref 13–17.7)
IMM GRANULOCYTES # BLD AUTO: 0.02 10*3/MM3 (ref 0–0.05)
IMM GRANULOCYTES NFR BLD AUTO: 0.5 % (ref 0–0.5)
LDLC SERPL CALC-MCNC: 70 MG/DL (ref 0–100)
LDLC/HDLC SERPL: 2.34 {RATIO}
LV EF NUC BP: 54 %
LYMPHOCYTES # BLD AUTO: 1.34 10*3/MM3 (ref 0.7–3.1)
LYMPHOCYTES NFR BLD AUTO: 34.4 % (ref 19.6–45.3)
MAXIMAL PREDICTED HEART RATE: 150 BPM
MCH RBC QN AUTO: 29.4 PG (ref 26.6–33)
MCHC RBC AUTO-ENTMCNC: 32.5 G/DL (ref 31.5–35.7)
MCV RBC AUTO: 90.4 FL (ref 79–97)
MONOCYTES # BLD AUTO: 0.53 10*3/MM3 (ref 0.1–0.9)
MONOCYTES NFR BLD AUTO: 13.6 % (ref 5–12)
MYOGLOBIN SERPL-MCNC: 29.9 NG/ML (ref 28–72)
NEUTROPHILS NFR BLD AUTO: 1.68 10*3/MM3 (ref 1.7–7)
NEUTROPHILS NFR BLD AUTO: 43.3 % (ref 42.7–76)
NRBC BLD AUTO-RTO: 0 /100 WBC (ref 0–0.2)
PERCENT MAX PREDICTED HR: 45.33 %
PLATELET # BLD AUTO: 226 10*3/MM3 (ref 140–450)
PMV BLD AUTO: 10.4 FL (ref 6–12)
POTASSIUM SERPL-SCNC: 4.1 MMOL/L (ref 3.5–5.2)
RBC # BLD AUTO: 5 10*6/MM3 (ref 4.14–5.8)
SODIUM SERPL-SCNC: 141 MMOL/L (ref 136–145)
STRESS BASELINE BP: NORMAL MMHG
STRESS BASELINE HR: 54 BPM
STRESS O2 SAT REST: 97 %
STRESS PERCENT HR: 53 %
STRESS POST ESTIMATED WORKLOAD: 1 METS
STRESS POST EXERCISE DUR MIN: 4 MIN
STRESS POST EXERCISE DUR SEC: 0 SEC
STRESS POST O2 SAT PEAK: 99 %
STRESS POST PEAK BP: NORMAL MMHG
STRESS POST PEAK HR: 68 BPM
STRESS TARGET HR: 128 BPM
TRIGL SERPL-MCNC: 194 MG/DL (ref 0–150)
TROPONIN T SERPL-MCNC: <0.01 NG/ML (ref 0–0.03)
VLDLC SERPL-MCNC: 38.8 MG/DL
WBC # BLD AUTO: 3.89 10*3/MM3 (ref 3.4–10.8)

## 2020-08-03 PROCEDURE — 97161 PT EVAL LOW COMPLEX 20 MIN: CPT

## 2020-08-03 PROCEDURE — 93018 CV STRESS TEST I&R ONLY: CPT | Performed by: INTERNAL MEDICINE

## 2020-08-03 PROCEDURE — 25010000002 HEPARIN (PORCINE) PER 1000 UNITS: Performed by: NURSE PRACTITIONER

## 2020-08-03 PROCEDURE — 82962 GLUCOSE BLOOD TEST: CPT

## 2020-08-03 PROCEDURE — 94799 UNLISTED PULMONARY SVC/PX: CPT

## 2020-08-03 PROCEDURE — G0378 HOSPITAL OBSERVATION PER HR: HCPCS

## 2020-08-03 PROCEDURE — 99217 PR OBSERVATION CARE DISCHARGE MANAGEMENT: CPT | Performed by: INTERNAL MEDICINE

## 2020-08-03 PROCEDURE — 80048 BASIC METABOLIC PNL TOTAL CA: CPT | Performed by: NURSE PRACTITIONER

## 2020-08-03 PROCEDURE — 99204 OFFICE O/P NEW MOD 45 MIN: CPT | Performed by: INTERNAL MEDICINE

## 2020-08-03 PROCEDURE — A9555 RB82 RUBIDIUM: HCPCS | Performed by: NURSE PRACTITIONER

## 2020-08-03 PROCEDURE — 96372 THER/PROPH/DIAG INJ SC/IM: CPT

## 2020-08-03 PROCEDURE — 63710000001 INSULIN LISPRO (HUMAN) PER 5 UNITS: Performed by: NURSE PRACTITIONER

## 2020-08-03 PROCEDURE — 96361 HYDRATE IV INFUSION ADD-ON: CPT

## 2020-08-03 PROCEDURE — 97165 OT EVAL LOW COMPLEX 30 MIN: CPT

## 2020-08-03 PROCEDURE — 83874 ASSAY OF MYOGLOBIN: CPT | Performed by: NURSE PRACTITIONER

## 2020-08-03 PROCEDURE — 93017 CV STRESS TEST TRACING ONLY: CPT

## 2020-08-03 PROCEDURE — 93010 ELECTROCARDIOGRAM REPORT: CPT | Performed by: INTERNAL MEDICINE

## 2020-08-03 PROCEDURE — 84484 ASSAY OF TROPONIN QUANT: CPT | Performed by: NURSE PRACTITIONER

## 2020-08-03 PROCEDURE — 93005 ELECTROCARDIOGRAM TRACING: CPT | Performed by: NURSE PRACTITIONER

## 2020-08-03 PROCEDURE — 78492 MYOCRD IMG PET MLT RST&STRS: CPT | Performed by: INTERNAL MEDICINE

## 2020-08-03 PROCEDURE — 0 RUBIDIUM CHLORIDE: Performed by: NURSE PRACTITIONER

## 2020-08-03 PROCEDURE — 80061 LIPID PANEL: CPT | Performed by: NURSE PRACTITIONER

## 2020-08-03 PROCEDURE — 82550 ASSAY OF CK (CPK): CPT | Performed by: NURSE PRACTITIONER

## 2020-08-03 PROCEDURE — 85025 COMPLETE CBC W/AUTO DIFF WBC: CPT | Performed by: NURSE PRACTITIONER

## 2020-08-03 PROCEDURE — 78492 MYOCRD IMG PET MLT RST&STRS: CPT

## 2020-08-03 PROCEDURE — 25010000002 REGADENOSON 0.4 MG/5ML SOLUTION: Performed by: NURSE PRACTITIONER

## 2020-08-03 RX ORDER — ATORVASTATIN CALCIUM 10 MG/1
10 TABLET, FILM COATED ORAL DAILY
Status: DISCONTINUED | OUTPATIENT
Start: 2020-08-03 | End: 2020-08-03 | Stop reason: HOSPADM

## 2020-08-03 RX ORDER — RANOLAZINE 500 MG/1
500 TABLET, EXTENDED RELEASE ORAL EVERY 12 HOURS SCHEDULED
Qty: 60 TABLET | Refills: 3 | Status: SHIPPED | OUTPATIENT
Start: 2020-08-03 | End: 2020-12-28 | Stop reason: SDUPTHER

## 2020-08-03 RX ORDER — RANOLAZINE 500 MG/1
500 TABLET, EXTENDED RELEASE ORAL EVERY 12 HOURS SCHEDULED
Status: DISCONTINUED | OUTPATIENT
Start: 2020-08-03 | End: 2020-08-03 | Stop reason: HOSPADM

## 2020-08-03 RX ADMIN — LORAZEPAM 2 MG: 1 TABLET ORAL at 08:40

## 2020-08-03 RX ADMIN — HEPARIN SODIUM 5000 UNITS: 5000 INJECTION INTRAVENOUS; SUBCUTANEOUS at 14:09

## 2020-08-03 RX ADMIN — SODIUM CHLORIDE 75 ML/HR: 9 INJECTION, SOLUTION INTRAVENOUS at 06:22

## 2020-08-03 RX ADMIN — INSULIN LISPRO 3 UNITS: 100 INJECTION, SOLUTION INTRAVENOUS; SUBCUTANEOUS at 08:49

## 2020-08-03 RX ADMIN — SODIUM CHLORIDE, PRESERVATIVE FREE 10 ML: 5 INJECTION INTRAVENOUS at 08:41

## 2020-08-03 RX ADMIN — AMLODIPINE BESYLATE 10 MG: 5 TABLET ORAL at 08:38

## 2020-08-03 RX ADMIN — RUBIDIUM CHLORIDE RB-82 1 DOSE: 150 INJECTION, SOLUTION INTRAVENOUS at 09:36

## 2020-08-03 RX ADMIN — ESCITALOPRAM OXALATE 20 MG: 10 TABLET ORAL at 08:38

## 2020-08-03 RX ADMIN — ASPIRIN 81 MG: 81 TABLET, COATED ORAL at 08:38

## 2020-08-03 RX ADMIN — ATORVASTATIN CALCIUM 10 MG: 10 TABLET, FILM COATED ORAL at 14:09

## 2020-08-03 RX ADMIN — LISINOPRIL 40 MG: 40 TABLET ORAL at 08:39

## 2020-08-03 RX ADMIN — CETIRIZINE HYDROCHLORIDE 10 MG: 10 TABLET, FILM COATED ORAL at 08:38

## 2020-08-03 RX ADMIN — RANOLAZINE 500 MG: 500 TABLET, FILM COATED, EXTENDED RELEASE ORAL at 14:09

## 2020-08-03 RX ADMIN — KETOTIFEN FUMARATE 1 DROP: 0.35 SOLUTION/ DROPS OPHTHALMIC at 08:57

## 2020-08-03 RX ADMIN — METOPROLOL TARTRATE 25 MG: 25 TABLET, FILM COATED ORAL at 08:39

## 2020-08-03 RX ADMIN — INSULIN LISPRO 3 UNITS: 100 INJECTION, SOLUTION INTRAVENOUS; SUBCUTANEOUS at 12:06

## 2020-08-03 RX ADMIN — GABAPENTIN 300 MG: 300 CAPSULE ORAL at 08:38

## 2020-08-03 RX ADMIN — CLOPIDOGREL BISULFATE 75 MG: 75 TABLET ORAL at 08:39

## 2020-08-03 RX ADMIN — RUBIDIUM CHLORIDE RB-82 1 DOSE: 150 INJECTION, SOLUTION INTRAVENOUS at 09:23

## 2020-08-03 RX ADMIN — BUDESONIDE AND FORMOTEROL FUMARATE DIHYDRATE 2 PUFF: 160; 4.5 AEROSOL RESPIRATORY (INHALATION) at 08:44

## 2020-08-03 RX ADMIN — INSULIN LISPRO 3 UNITS: 100 INJECTION, SOLUTION INTRAVENOUS; SUBCUTANEOUS at 08:41

## 2020-08-03 RX ADMIN — REGADENOSON 0.4 MG: 0.08 INJECTION, SOLUTION INTRAVENOUS at 09:37

## 2020-08-03 RX ADMIN — POTASSIUM CHLORIDE 40 MEQ: 10 CAPSULE, COATED, EXTENDED RELEASE ORAL at 01:19

## 2020-08-03 RX ADMIN — PANTOPRAZOLE SODIUM 40 MG: 40 TABLET, DELAYED RELEASE ORAL at 06:22

## 2020-08-03 RX ADMIN — HEPARIN SODIUM 5000 UNITS: 5000 INJECTION INTRAVENOUS; SUBCUTANEOUS at 06:22

## 2020-08-03 NOTE — PROGRESS NOTES
Discharge Planning Assessment  [facility]     Patient Name: Panda Henry  MRN: [id number]  Today's Date: 8/3/2020    Admit Date: 8/2/2020    Discharge Needs Assessment     Row Name 08/03/20 1123       Living Environment    Lives With  spouse    Current Living Arrangements  home/apartment/condo       Discharge Needs Assessment    Equipment Currently Used at Home  bipap/cpap    Equipment Needed After Discharge  none    Discharge Coordination/Progress  No HH or outpt services involved in the pts care currently. [facility]        Discharge Plan     Row Name 08/03/20 1125       Plan    Plan  Home at DC    Patient/Family in Agreement with Plan  yes    Plan Comments  I spoke with the pt. He denies any DC needs at this time.    Row Name 08/03/20 0828       Plan    Final Discharge Disposition Code  01 - home or self-care        Destination      Coordination has not been started for this encounter.      Durable Medical Equipment      Coordination has not been started for this encounter.      Dialysis/Infusion      Coordination has not been started for this encounter.      Home Medical Care      Coordination has not been started for this encounter.      Therapy      Coordination has not been started for this encounter.      Community Resources      Coordination has not been started for this encounter.        Expected Discharge Date and Time     Expected Discharge Date Expected Discharge Time    Aug 5, 2020         Demographic Summary    No documentation.       Functional Status     Row Name 08/03/20 1123       Functional Status    Usual Activity Tolerance  good       Functional Status, IADL    Medications  independent    Meal Preparation  independent    Housekeeping  independent    Laundry  independent    Shopping  independent        Psychosocial    No documentation.       Abuse/Neglect    No documentation.       Legal    No documentation.       Substance Abuse    No documentation.       Patient Forms    No  documentation.           Shereen Carreno RN

## 2020-08-03 NOTE — H&P
Panda Henry  [id number]  1950   LOS: 0 days   No care team member to display    CARDIOLOGIST: Alan Foy DO    Mr. Henry is a 70-year-old  white male from [place], [place], retired .    Chief Complaint: Chest pain    Problem List:  1. Coronary artery disease  a. Abnormal stress test followed by left heart catheterization with stent placement to the LAD February 2019-data deficit ([facility])  b. CCS class I-II atypical chest discomfort/NYHA class I-II dyspnea on exertion symptoms  c. Cardiac PET 8/3/2020: Acceptable negative IV Lexiscan hybrid PET cardiac CT stress scan studies suggestive of low probability for significant focal obstructive CAD with preserved systolic LV function (LVEF 0.54)  2. Lower extremity weakness  3. Hypertension  4. Hyperlipidemia  5. Type 2 diabetes mellitus; hemoglobin A1c 7.9% August 2020  6. GERD  7. Obstructive sleep apnea, compliant with CPAP  8. Orthostatic dizziness  9. Mild obesity: BMI 30.37  10. Depression  11. COPD  12. Chronic back pain  13. Smokeless tobacco use; 1 stick every 3 days  14. History of basal cell carcinoma  15. History of rheumatic fever  16. Surgical history:  a. Blepharoplasty bilateral  b. LHC  c. Right rotator cuff repair  d. Sinus plasty  e. Basal cell carcinoma skin excision      No Known Allergies  Medications Prior to Admission   Medication Sig Dispense Refill Last Dose   • aspirin 81 MG EC tablet Take 81 mg by mouth Daily.   8/2/2020 at Unknown time   • budesonide-formoterol (SYMBICORT) 160-4.5 MCG/ACT inhaler Inhale 2 puffs 2 (Two) Times a Day.   8/2/2020 at Unknown time   • celecoxib (CeleBREX) 100 MG capsule Take 75 mg by mouth 2 (Two) Times a Day As Needed for Mild Pain .      • clopidogrel (PLAVIX) 75 MG tablet Take 75 mg by mouth Daily.   8/2/2020 at Unknown time   • docusate sodium (COLACE) 250 MG capsule Take 250 mg by mouth Daily As Needed.   Past Week at Unknown time   • doxepin  (SINEquan) 100 MG capsule Take 100 mg by mouth Every Night.   8/1/2020 at Unknown time   • gabapentin (NEURONTIN) 300 MG capsule Take 300 mg by mouth 3 (Three) Times a Day.   8/2/2020 at Unknown time   • levocetirizine (XYZAL) 5 MG tablet Take 5 mg by mouth Every Evening.   8/1/2020 at Unknown time   • LORazepam (ATIVAN) 2 MG tablet Take 2 mg by mouth 3 (Three) Times a Day.   8/2/2020 at Unknown time   • lovastatin (ALTOPREV) 40 MG 24 hr tablet Take 40 mg by mouth Every Night.   8/1/2020 at Unknown time   • metoprolol tartrate (LOPRESSOR) 25 MG tablet Take 25 mg by mouth 2 (Two) Times a Day.   8/2/2020 at Unknown time   • montelukast (SINGULAIR) 10 MG tablet Take 10 mg by mouth Every Night.   8/1/2020 at Unknown time   • omeprazole (priLOSEC) 40 MG capsule Take 40 mg by mouth Daily.   8/2/2020 at Unknown time   • simethicone (MYLICON) 80 MG chewable tablet Chew 80 mg Every 6 (Six) Hours As Needed for Flatulence.   Past Week at Unknown time   • tamsulosin (FLOMAX) 0.4 MG capsule 24 hr capsule Take 1 capsule by mouth Daily.   8/2/2020 at Unknown time   • albuterol sulfate  (90 Base) MCG/ACT inhaler Inhale 2 puffs Every 4 (Four) Hours As Needed for Wheezing.   More than a month at Unknown time   • amLODIPine (NORVASC) 10 MG tablet Take 10 mg by mouth Daily.   Unknown at Unknown time   • escitalopram (LEXAPRO) 20 MG tablet Take 20 mg by mouth Daily.   Unknown at Unknown time   • insulin NPH-insulin regular (humuLIN 70/30,novoLIN 70/30) (70-30) 100 UNIT/ML injection Inject  under the skin into the appropriate area as directed 2 (Two) Times a Day With Meals.   Unknown at Unknown time   • ketotifen (ZADITOR) 0.025 % ophthalmic solution 1 drop 2 (Two) Times a Day.   Unknown at Unknown time   • lisinopril (PRINIVIL,ZESTRIL) 40 MG tablet Take 40 mg by mouth Daily.   Unknown at Unknown time   • nitroglycerin (NITROSTAT) 0.4 MG SL tablet Place 0.4 mg under the tongue Every 5 (Five) Minutes As Needed for Chest Pain. Take  no more than 3 doses in 15 minutes.   More than a month at Unknown time   • tadalafil (ADCIRCA) 20 MG tablet tablet Take 20 mg by mouth Daily As Needed.   Unknown at Unknown time   • triamcinolone (KENALOG) 0.1 % cream Apply  topically to the appropriate area as directed 2 (Two) Times a Day.   Unknown at Unknown time     Scheduled Meds:  amLODIPine 10 mg Oral Daily   aspirin 81 mg Oral Daily   budesonide-formoterol 2 puff Inhalation BID - RT   cetirizine 10 mg Oral Daily   clopidogrel 75 mg Oral Daily   doxepin 100 mg Oral Nightly   escitalopram 20 mg Oral Daily   gabapentin 300 mg Oral TID   heparin (porcine) 5,000 Units Subcutaneous Q8H   insulin detemir 15 Units Subcutaneous Nightly   insulin lispro 0-14 Units Subcutaneous TID AC   insulin lispro 3 Units Subcutaneous TID With Meals   ketotifen 1 drop Both Eyes BID   lisinopril 40 mg Oral Daily   LORazepam 2 mg Oral TID   metoprolol tartrate 25 mg Oral Q12H   montelukast 10 mg Oral Nightly   pantoprazole 40 mg Oral QAM   regadenoson 0.4 mg Intravenous Once   sodium chloride 10 mL Intravenous Q12H   tamsulosin 0.4 mg Oral Nightly     Continuous Infusions:  sodium chloride 75 mL/hr Last Rate: 75 mL/hr (08/03/20 0622)          History of Present Illness:   This is a 70-year-old white male who presented to [facility] 8/3/2020 with dull chest pain that lasted only a minute.  The patient states that he developed some dizziness on standing when he was getting up out of his bed yesterday and had some dull chest discomfort.  He has nitroglycerin sublingual available but did not have to use it.  He denies any nausea, diaphoresis, syncope, shortness of breath, or palpitations with this chest pain.  He states that his chest was a little sore to the touch.  He has had some lower extremity weakness and difficulties with balance.  His chest x-ray was negative for cardiopulmonary disease and his ECG did not demonstrate any ST-T changes.  The patient had a IV Lexiscan PET cardiac stress  test  this morning which was acceptable.  Troponins have been negative x3.  On admission the patient's potassium was 3.4 but is now within normal limits this morning.  Hemoglobin A1c 7.9%.  CRP and magnesium were within normal limits.  Patient had an abnormal stress test followed by heart catheterization with stent to the LAD in February 2019 at [facility].  He has never had any MIs, CVAs, TIAs, seizures, DVTs, or PEs.  The patient had rheumatic fever as a child and has had echocardiograms at [facility].  The patient states that he was intolerant to Imdur.  The patient has obstructive sleep apnea but is compliant with CPAP nightly. He has only had to use NTG twice since his stent.  Currently he is asymptomatic and hungry after completing his pharmacologic myocardial perfusion stress test earlier today.    Cardiac risk factors: advanced age (older than 55 for men, 65 for women), diabetes mellitus, dyslipidemia, hypertension, male gender, obesity (BMI >= 30 kg/m2), sedentary lifestyle and smoking/ tobacco exposure.    Social History     Socioeconomic History   • Marital status:      Spouse name: Not on file   • Number of children: Not on file   • Years of education: Not on file   • Highest education level: Not on file   Tobacco Use   • Smoking status: Never Smoker   • Smokeless tobacco: Current User     Types: Chew   Substance and Sexual Activity   • Alcohol use: Never     Frequency: Never   • Drug use: Never   • Sexual activity: Defer   Social History Narrative    Live in [facility] with wife and grandson    Retired      Family History   Problem Relation Age of Onset   • Diabetes Mother    • Stroke Mother    • Coronary artery disease Mother    • Lung cancer Father    • Coronary artery disease Father    • Heart failure Sister    • Coronary artery disease Brother    • Aneurysm Brother         brain   • Cancer Brother         unknown location   • Alcohol abuse Brother   "      Review of Systems  10 point review of systems was completed, positives outlined in the HPI, and otherwise all other systems are negative.      Objective:       Physical Exam  /79 (BP Location: Right arm, Patient Position: Lying)   Pulse 51   Temp 97.9 °F (36.6 °C) (Oral)   Resp 16   Ht 190.5 cm (75\")   Wt 110 kg (243 lb)   SpO2 100%   BMI 30.37 kg/m²       08/02/20  1238   Weight: 110 kg (243 lb)     Body mass index is 30.37 kg/m².    Intake/Output Summary (Last 24 hours) at 8/3/2020 0905  Last data filed at 8/3/2020 0600  Gross per 24 hour   Intake 1502 ml   Output --   Net 1502 ml       General Appearance:  Alert, cooperative, no distress, appears stated age   Head:  Normocephalic, without obvious abnormality, atraumatic   Neck: Supple, symmetrical, trachea midline, no adenopathy, thyroid: not enlarged, symmetric, no tenderness/mass/nodules, no carotid bruit or JVD   Lungs:    Decreased breath sounds to auscultation bilaterally, respirations unlabored   Heart:  Regular rate and rhythm, S1, S2 normal, grade 2/6 murmur, rub or gallop   Abdomen:   Soft, non-tender, no masses, no organomegaly, bowel sounds audible x4   Extremities: No edema, normal range of motion   Pulses: 1+ and symmetric   Skin: Skin color, texture, turgor normal, no rashes or lesions   Neurologic: Normal       · Cardiographics:    · EKG 8/3/2020:    Sinus bradycardia  Nonspecific T wave abnormality  Abnormal ECG  When compared with ECG of 02-AUG-2020 12:47,  No significant change was found.    · Cardiac PET 8/3/2020:    · Patient denied chest pain/pressure but reported transient dyspnea with IV Lexiscan injection that resolved quickly with normal room air oximetry readings (98-99%).  · SB/SR with stable T wave inversion in the absence of ischemic ST-T changes or ectopy.  · BMI 30.3.  · There is no prior study available for comparison. REST EF = 54 % STRESS EF = 64 %.  · Left ventricular ejection fraction is normal (calculated EF " = 54%); WNL TID = 1.11.  · Myocardial perfusion imaging indicates a normal myocardial perfusion study with no evidence of ischemia.  · Impressions are consistent with a low risk study.  · Findings consistent with an abnormal acceptable pharmacologic ECG stress test.  · Acceptable negative IV Lexiscan hybrid PET cardiac CT stress scan study suggestive of low probability for significant focal obstructive coronary artery disease with preserved systolic left ventricular function (LVEF 0.54).    · Imaging:    · Chest x-ray 8/2/2020: No acute cardiopulmonary disease    Lab Review:     Results from last 7 days   Lab Units 08/03/20  0404 08/02/20  1256   SODIUM mmol/L 141 137   POTASSIUM mmol/L 4.1 3.4*   CHLORIDE mmol/L 107 100   CO2 mmol/L 25.0 25.0   BUN mg/dL 8 8   CREATININE mg/dL 0.90 0.96   GLUCOSE mg/dL 168* 154*   CALCIUM mg/dL 8.8 8.9     Results from last 7 days   Lab Units 08/03/20  0404 08/02/20  1256   WBC 10*3/mm3 3.89 4.52   HEMOGLOBIN g/dL 14.7 15.3   HEMATOCRIT % 45.2 45.2   PLATELETS 10*3/mm3 226 213     Results from last 7 days   Lab Units 08/02/20  1256   HEMOGLOBIN A1C % 7.90*     Results from last 7 days   Lab Units 08/03/20  0404 08/02/20  1753 08/02/20  1256   CK TOTAL U/L  --   --  56   TROPONIN T ng/mL <0.010 <0.010 <0.010       Assessment:   Patient with atypical chest discomfort with negative troponins x3 and ECG with no ST-T changes in the setting of an abnormal baseline electrocardiogram.  The patient had an acceptable cardiac PET this morning.  We will try to obtain medical records and the cath report from the patient's stent in February 2019 at [facility].  Doubt ACS. With his precocious CAD and diabetes, we feel that he needs to be on a statin medication; he was on lovastatin at home but this has not been continued. Would consider altering metoprolol to Coreg (MINGO trial).  Would defer repeat diagnostic coronary angiography at this time.  Would assess CPK and myoglobin levels to  rule out myositis.  Hopefully, additionally a ESR to rule out remote consideration of polymyalgia rheumatica has been obtained.      Plan:   1.Try to obtain cardiology records from [place]  2.The patient would benefit from rehabilitation for his gait disturbances and balance after discharge  3. Ranexa 500 mg twice daily in view of intolerance to Imdur  4. CPK, myoglobin, FLP  5. Follow up with Dr. Foy in 4 weeks after discharge  6. Would restart statin drug therapy  7. We will stand by and be available as needed    Scribed for Jm Altamirano MD by Catalina Sewell, APRN. 8/3/2020  11:36     I have seen and examined the patient, case was discussed with the physician extender, reviewed the above note, necessary changes were made and I agree with the final note.     Jm Altamirano MD [facility]

## 2020-08-03 NOTE — PLAN OF CARE
Problem: Patient Care Overview  Goal: Plan of Care Review  Flowsheets (Taken 8/3/2020 1152)  Progress: improving  Plan of Care Reviewed With: patient  Outcome Summary: PT eval performed: Pt presents with some slight balance deficits and generalied weakness limiting function.  Pt able to ambulate 250' with SBA, slightly unstable on turns and needed CGA.  Pt limited by balance and stamina.  Recommend either HHPT or if patient comfortable with transportation OPPT to address stamina, strength and balance.

## 2020-08-03 NOTE — THERAPY EVALUATION
Patient Name: Panda Henry  : 1950    MRN: [id number]                              Today's Date: 8/3/2020       Admit Date: 2020    Visit Dx:     ICD-10-CM ICD-9-CM   1. Generalized weakness R53.1 780.79   2. Chest pain in adult R07.9 786.50   3. UREÑA (dyspnea on exertion) R06.00 786.09   4. History of coronary artery disease Z86.79 V12.59   5. History of hypertension Z86.79 V12.59   6. Diabetes mellitus type 2 in obese (CMS/HCC) E11.69 250.00    E66.9 278.00     Patient Active Problem List   Diagnosis   • Generalized weakness   • Chest pain   • Hypertension   • Coronary artery disease   • Diabetes mellitus (CMS/HCC)   • Chronic back pain   • Hypokalemia     Past Medical History:   Diagnosis Date   • Cancer (CMS/HCC)     basal cell carcinoma   • COPD (chronic obstructive pulmonary disease) (CMS/[facility])    • Coronary artery disease    • Depression    • Diabetes mellitus (CMS/HCC)    • GERD (gastroesophageal reflux disease)    • Hyperlipidemia    • Hypertension    • Injury of back      Past Surgical History:   Procedure Laterality Date   • BLEPHAROPLASTY Bilateral    • CARDIAC CATHETERIZATION     • SHOULDER ROTATOR CUFF REPAIR Right    • SINUPLASTY     • SKIN BIOPSY       General Information     Row Name 20 1149          PT Evaluation Time/Intention    Document Type  discharge evaluation/summary  -KG     Mode of Treatment  physical therapy;individual therapy  -KG     Row Name 20 1149          General Information    Patient Profile Reviewed?  yes  -KG     Prior Level of Function  independent:;all household mobility;community mobility;home management;ADL's;driving  -KG     Existing Precautions/Restrictions  fall  -KG     Barriers to Rehab  none identified  -KG     Row Name 20 1149          Relationship/Environment    Lives With  spouse  -KG     Row Name 20 1149          Resource/Environmental Concerns    Current Living Arrangements  home/apartment/condo  -KG     Row Name 20 1140           Home Main Entrance    Number of Stairs, Main Entrance  one  -KG     Stair Railings, Main Entrance  railings safe and in good condition  -KG     Row Name 08/03/20 1149          Cognitive Assessment/Intervention- PT/OT    Orientation Status (Cognition)  oriented x 4  -KG       User Key  (r) = Recorded By, (t) = Taken By, (c) = Cosigned By    Initials Name Provider Type    BHAKTI Kaylah Hernandez Physical Therapist        Mobility     Row Name 08/03/20 1150          Bed Mobility Assessment/Treatment    Bed Mobility Assessment/Treatment  scooting/bridging;supine-sit  -KG     Scooting/Bridging [place] (Bed Mobility)  conditional independence  -KG     Supine-Sit [place] (Bed Mobility)  conditional independence  -KG     Assistive Device (Bed Mobility)  bed rails;head of bed elevated  -KG     Row Name 08/03/20 1150          Sit-Stand Transfer    Sit-Stand [place] (Transfers)  supervision  -KG     Row Name 08/03/20 1150          Gait/Stairs Assessment/Training    Gait/Stairs Assessment/Training  gait/ambulation independence  -KG     [place] Level (Gait)  stand by assist;contact guard  -KG     Assistive Device (Gait)  -- gait belt  -KG     Distance in Feet (Gait)  250  -KG     Deviations/Abnormal Patterns (Gait)  gait speed decreased;base of support, wide  -KG     Bilateral Gait Deviations  forward flexed posture  -KG     Comment (Gait/Stairs)  Pt able to ambulate 250' with SBA, slightly unstable on turns and needed CGA.  Pt limited by balance and stamina  -KG       User Key  (r) = Recorded By, (t) = Taken By, (c) = Cosigned By    Initials Name Provider Type    BHAKTI Kaylah Hernandez Physical Therapist        Obj/Interventions     Row Name 08/03/20 1152          Static Sitting Balance    Level of [place] (Unsupported Sitting, Static Balance)  conditional independence  -KG     Sitting Position (Unsupported Sitting, Static Balance)  sitting on edge of bed  -KG     Time Able to Maintain Position  (Unsupported Sitting, Static Balance)  45 to 60 seconds  -KG     Row Name 08/03/20 1152          Dynamic Standing Balance    Level of [place], Reaches Outside Midline (Standing, Dynamic Balance)  contact guard assist  -KG     Time Able to Maintain Position, Reaches Outside Midline (Standing, Dynamic Balance)  more than 5 minutes  -KG     Assistive Device Utilized (Supported Standing, Dynamic Balance)  -- gait belt  -KG       User Key  (r) = Recorded By, (t) = Taken By, (c) = Cosigned By    Initials Name Provider Type    KG Kaylah Hernandez Physical Therapist        Goals/Plan    No documentation.       Clinical Impression     Row Name 08/03/20 1152          Pain Assessment    Additional Documentation  Pain Scale: Numbers Pre/Post-Treatment (Group)  -KG     [facility] Name 08/03/20 1152          Pain Scale: Numbers Pre/Post-Treatment    Pain Scale: Numbers, Pretreatment  2/10  -KG     Pain Scale: Numbers, Post-Treatment  6/10  -KG     Pain Location - Side  Bilateral  -KG     Pain Location - Orientation  posterior  -KG     Pain Location  neck  -KG     Pain Intervention(s)  Repositioned;Ambulation/increased activity  -KG     [facility] Name 08/03/20 1152          Plan of Care Review    Plan of Care Reviewed With  patient  -KG     Progress  improving  -KG     Outcome Summary  PT eval performed: Pt presents with some slight balance deficits and generalied weakness limiting function.  Pt able to ambulate 250' with SBA, slightly unstable on turns and needed CGA.  Pt limited by balance and stamina.  Recommend either HHPT or if patient comfortable with transportation OPPT to address stamina, strength and balance.   -KG     [facility] Name 08/03/20 1152          Vital Signs    Pre Systolic BP Rehab  150  -KG     Pre Treatment Diastolic BP  74  -KG     Post Systolic BP Rehab  162  -KG     Post Treatment Diastolic BP  84  -KG     Pretreatment Heart Rate (beats/min)  55  -KG     Posttreatment Heart Rate (beats/min)  59  -KG     Row Name  08/03/20 1152          Positioning and Restraints    Pre-Treatment Position  in bed  -KG     Post Treatment Position  bed  -KG     In Bed  fowlers;call light within reach;encouraged to call for assist;exit alarm on;with other staff  -KG       User Key  (r) = Recorded By, (t) = Taken By, (c) = Cosigned By    Initials Name Provider Type    Kaylah Ward Physical Therapist        Outcome Measures     Row Name 08/03/20 1156          How much help from another person do you currently need...    Turning from your back to your side while in flat bed without using bedrails?  4  -KG     Moving from lying on back to sitting on the side of a flat bed without bedrails?  4  -KG     Moving to and from a bed to a chair (including a wheelchair)?  4  -KG     Standing up from a chair using your arms (e.g., wheelchair, bedside chair)?  4  -KG     Climbing 3-5 steps with a railing?  3  -KG     To walk in hospital room?  3  -KG     AM-PAC 6 Clicks Score (PT)  22  -KG     Row Name 08/03/20 1156          Functional Assessment    Outcome Measure Options  AM-PAC 6 Clicks Basic Mobility (PT)  -KG       User Key  (r) = Recorded By, (t) = Taken By, (c) = Cosigned By    Initials Name Provider Type    Kaylah Ward Physical Therapist        Physical Therapy Education                 Title: PT OT SLP Therapies (Done)     Topic: Physical Therapy (Done)     Point: Mobility training (Done)     Description:   Instruct learner(s) on safety and technique for assisting patient out of bed, chair or wheelchair.  Instruct in the proper use of assistive devices, such as walker, crutches, cane or brace.              Patient Friendly Description:   It's important to get you on your feet again, but we need to do so in a way that is safe for you. Falling has serious consequences, and your personal safety is the most important thing of all.        When it's time to get out of bed, one of us or a family member will sit next to you on the bed to give  you support.     If your doctor or nurse tells you to use a walker, crutches, a cane, or a brace, be sure you use it every time you get out of bed, even if you think you don't need it.    Learning Progress Summary           Patient Acceptance, E,TB, VU by KG at 8/3/2020 1158    Comment:  d/c planning, mobility safety                   Point: Home exercise program (Done)     Description:   Instruct learner(s) on appropriate technique for monitoring, assisting and/or progressing patient with therapeutic exercises and activities.              Learning Progress Summary           Patient Acceptance, E,TB, VU by KG at 8/3/2020 1158    Comment:  d/c planning, mobility safety                   Point: Body mechanics (Done)     Description:   Instruct learner(s) on proper positioning and spine alignment for patient and/or caregiver during mobility tasks and/or exercises.              Learning Progress Summary           Patient Acceptance, E,TB, VU by KG at 8/3/2020 1158    Comment:  d/c planning, mobility safety                   Point: Precautions (Done)     Description:   Instruct learner(s) on prescribed precautions during mobility and gait tasks              Learning Progress Summary           Patient Acceptance, E,TB, VU by KG at 8/3/2020 1158    Comment:  d/c planning, mobility safety                               User Key     Initials Effective Dates Name Provider Type Discipline    KG 08/28/19 -  Kaylah Hernandez Physical Therapist PT              PT Recommendation and Plan     Plan of Care Reviewed With: patient  Progress: improving  Outcome Summary: PT eval performed: Pt presents with some slight balance deficits and generalied weakness limiting function.  Pt able to ambulate 250' with SBA, slightly unstable on turns and needed CGA.  Pt limited by balance and stamina.  Recommend either HHPT or if patient comfortable with transportation OPPT to address stamina, strength and balance.      Time Calculation:   PT  Charges     Row Name 08/03/20 1200             Time Calculation    Start Time  1055  -KG      PT Received On  08/03/20  -KG        User Key  (r) = Recorded By, (t) = Taken By, (c) = Cosigned By    Initials Name Provider Type    Kaylah Ward Physical Therapist        Therapy Charges for Today     Code Description Service Date Service Provider Modifiers Qty    70212926829 HC PT EVAL LOW COMPLEXITY 4 8/3/2020 Kaylah Hernandez GP 1          PT G-Codes  Outcome Measure Options: AM-PAC 6 Clicks Basic Mobility (PT)  AM-PAC 6 Clicks Score (PT): 22  AM-PAC 6 Clicks Score (OT): 22    Kaylah Hernandez  8/3/2020

## 2020-08-03 NOTE — DISCHARGE SUMMARY
[facility] Medicine Services  Clinical Decision Unit  DISCHARGE SUMMARY    Patient Name: Panda Henry  : 1950  MRN: [id number]    Admission Date and Time: 2020 12:39 PM  Discharge Date and Time:  20    Primary Care Physician: No primary care provider on file. Dr Zapata- [facility] Course     Active Hospital Problems    Diagnosis  POA   • **Generalized weakness [R53.1]  Yes   • Chest pain [R07.9]  Yes   • Hypertension [I10]  Yes   • Coronary artery disease [I25.10]  Yes   • Diabetes mellitus (CMS/HCC) [E11.9]  Yes   • Chronic back pain [M54.9, G89.29]  Yes   • Hypokalemia [E87.6]  Yes      Resolved Hospital Problems   No resolved problems to display.          Brief CDU Course:  Panda Henry is a 70 y.o. male with history of CAD primarily followed at [facility] by Dr Foy who presented to [facility] ED with chest pain as well as lower extremity weakness.     Patient was admitted to CDU for observation and cardiac w/u. Patient was evaluated by cardiology, Dr Altamirano, and underwent stress testing with negative results. He was chest pain free upon discharge. Cardiology recommended medical therapy with addition of ranexa given intolerance to imdur. They further recommended f/u with primary cardiologist in 1 month, Dr Foy- patient reported he had appointment scheduled at this time.     No further changes to patient's home medications were made.    Patient was also evaluated by PT/OT and walked >200 ft. He felt as though his weakness was resolved and he was back to baseline. Patient will f/u with his PCP as well for his other co-morbid medical problems      Key Discharge Recommendations:  Patient's primary cardiologist, Dr Foy, in 4 weeks  PCP, Dr Zapata in [facility], 1-2 weeks    Discharge Evaluation     Vital Signs:   Temp:  [95.5 °F (35.3 °C)-98.1 °F (36.7 °C)] 95.5 °F (35.3 °C)  Heart Rate:  [51-83] 61  Resp:  [16-18] 16  BP: (141-166)/(66-83) 150/74     Physical  Exam:  GEN- no acute distress noted, resting in bed, awake  HEENT- atraumatic, normocephlic, eomi  NECK- supple, trachea midline, no masses  RESP: ctab, normal effort  CV: no murmurs, s1/s2, rrr  MSK: no edema noted, spontaneous movement of all extremities  NEURO: alert, oriented, no focal deficits  SKIN: no rashes  PSYCH: appropriate mood and affect       Pertinent  and/or Most Recent Results     Results from last 7 days   Lab Units 08/03/20  0404 08/02/20  1256   WBC 10*3/mm3 3.89 4.52   HEMOGLOBIN g/dL 14.7 15.3   HEMATOCRIT % 45.2 45.2   PLATELETS 10*3/mm3 226 213   SODIUM mmol/L 141 137   POTASSIUM mmol/L 4.1 3.4*   CHLORIDE mmol/L 107 100   CO2 mmol/L 25.0 25.0   BUN mg/dL 8 8   CREATININE mg/dL 0.90 0.96   GLUCOSE mg/dL 168* 154*   CALCIUM mg/dL 8.8 8.9     Results from last 7 days   Lab Units 08/02/20  1256   BILIRUBIN mg/dL 0.4   ALK PHOS U/L 102   ALT (SGPT) U/L 20   AST (SGOT) U/L 17     Results from last 7 days   Lab Units 08/03/20  1220   CHOLESTEROL mg/dL 139   TRIGLYCERIDES mg/dL 194*   HDL CHOL mg/dL 30*     Results from last 7 days   Lab Units 08/03/20  0404 08/02/20  1753 08/02/20  1256   HEMOGLOBIN A1C %  --   --  7.90*   TROPONIN T ng/mL <0.010 <0.010 <0.010       Brief Urine Lab Results  (Last result in the past 365 days)      Color   Clarity   Blood   Leuk Est   Nitrite   Protein   CREAT   Urine HCG        08/02/20 1428 Yellow Clear Negative Negative Negative Negative               Microbiology Results Abnormal     Procedure Component Value - Date/Time    COVID PRE-OP / PRE-PROCEDURE SCREENING ORDER (NO ISOLATION) - Swab, Nasopharynx [[id number]] Collected:  08/02/20 1821    Lab Status:  Final result Specimen:  Swab from Nasopharynx Updated:  08/02/20 1852    Narrative:       The following orders were created for panel order COVID PRE-OP / PRE-PROCEDURE SCREENING ORDER (NO ISOLATION) - Swab, Nasopharynx.  Procedure                               Abnormality         Status                      ---------                               -----------         ------                     COVID-19, ABBOTT IN-HOUS...[[id number]]  Normal              Final result                 Please view results for these tests on the individual orders.    COVID-19, ABBOTT IN-HOUSE,NP Swab (NO TRANSPORT MEDIA) 2 HR TAT - Swab, Nasopharynx [[id number]]  (Normal) Collected:  08/02/20 1821    Lab Status:  Final result Specimen:  Swab from Nasopharynx Updated:  08/02/20 1852     COVID19 Not Detected    Narrative:       Fact sheet for providers: https://www.fda.gov/media/162689/download     Fact sheet for patients: https://www.fda.gov/media/345392/download          Imaging Results (All)     Procedure Component Value Units Date/Time    XR Chest 1 View [[id number]] Collected:  08/02/20 1316     Updated:  08/03/20 1239    Narrative:          EXAMINATION: XR CHEST 1 VW - 08/02/2020     INDICATION: Weakness, dizziness, altered mental status.     COMPARISON: NONE     FINDINGS: Portable chest reveals cardiac and mediastinal silhouettes  within normal limits. The lung fields are clear. No focal parenchymal  opacification is present. No pleural effusion or pneumothorax. The bony  structures are unremarkable. The pulmonary vascularity is within normal  limits.         Impression:       No acute cardiopulmonary disease.     DICTATED:   08/02/2020  EDITED/ls :   08/02/2020      This report was finalized on 8/3/2020 12:36 PM by Dr. Kaylah Henderson MD.                              Plan for Follow-up of Pending Labs/Results: none pending    Discharge Medications and Follow-Up        Discharge Medications      New Medications      Instructions Start Date   ranolazine 500 MG 12 hr tablet  Commonly known as:  RANEXA   500 mg, Oral, Every 12 Hours Scheduled         Continue These Medications      Instructions Start Date   albuterol sulfate  (90 Base) MCG/ACT inhaler  Commonly known as:  PROVENTIL HFA;VENTOLIN HFA;PROAIR HFA   2 puffs,  Inhalation, Every 4 Hours PRN      amLODIPine 10 MG tablet  Commonly known as:  NORVASC   10 mg, Oral, Daily      aspirin 81 MG EC tablet   81 mg, Oral, Daily      budesonide-formoterol 160-4.5 MCG/ACT inhaler  Commonly known as:  SYMBICORT   2 puffs, Inhalation, 2 Times Daily - RT      celecoxib 100 MG capsule  Commonly known as:  CeleBREX   75 mg, Oral, 2 Times Daily PRN      clopidogrel 75 MG tablet  Commonly known as:  PLAVIX   75 mg, Oral, Daily      docusate sodium 250 MG capsule  Commonly known as:  COLACE   250 mg, Oral, Daily PRN      doxepin 100 MG capsule  Commonly known as:  SINEquan   100 mg, Oral, Nightly      escitalopram 20 MG tablet  Commonly known as:  LEXAPRO   20 mg, Oral, Daily      gabapentin 300 MG capsule  Commonly known as:  NEURONTIN   300 mg, Oral, 3 Times Daily      insulin NPH-insulin regular (70-30) 100 UNIT/ML injection  Commonly known as:  humuLIN 70/30,novoLIN 70/30   Subcutaneous, 2 Times Daily With Meals      ketotifen 0.025 % ophthalmic solution  Commonly known as:  ZADITOR   1 drop, 2 Times Daily      levocetirizine 5 MG tablet  Commonly known as:  XYZAL   5 mg, Oral, Every Evening      lisinopril 40 MG tablet  Commonly known as:  PRINIVIL,ZESTRIL   40 mg, Oral, Daily      LORazepam 2 MG tablet  Commonly known as:  ATIVAN   2 mg, Oral, 3 Times Daily      lovastatin 40 MG 24 hr tablet  Commonly known as:  ALTOPREV   40 mg, Oral, Nightly      metoprolol tartrate 25 MG tablet  Commonly known as:  LOPRESSOR   25 mg, Oral, 2 Times Daily      montelukast 10 MG tablet  Commonly known as:  SINGULAIR   10 mg, Oral, Nightly      nitroglycerin 0.4 MG SL tablet  Commonly known as:  NITROSTAT   0.4 mg, Sublingual, Every 5 Minutes PRN, Take no more than 3 doses in 15 minutes.       omeprazole 40 MG capsule  Commonly known as:  priLOSEC   40 mg, Oral, Daily      simethicone 80 MG chewable tablet  Commonly known as:  MYLICON   80 mg, Oral, Every 6 Hours PRN      tadalafil 20 MG tablet  tablet  Commonly known as:  ADCIRCA   20 mg, Oral, Daily PRN      tamsulosin 0.4 MG capsule 24 hr capsule  Commonly known as:  FLOMAX   1 capsule, Oral, Daily      triamcinolone 0.1 % cream  Commonly known as:  KENALOG   Topical, 2 Times Daily               No future appointments.    Additional Instructions for the Follow-ups that You Need to Schedule     Discharge Follow-up with Specified Provider: Dr gibson Primary cardiologist in 4 weeks; 4 Months   As directed      To:  Dr gibson Primary cardiologist in 4 weeks    Follow Up:  4 Months                 Electronically signed by Sherry Morrell MD, 08/03/20, 3:29 PM.    Time Spent on Discharge: I spent  25  minutes on this discharge activity which included: face-to-face encounter with the patient, reviewing the data in the system, coordination of the care with the nursing staff as well as consultants, documentation, and entering orders.

## 2020-08-03 NOTE — PLAN OF CARE
VS stable. Pt ambulates well with stand by assist. No complaints of dyspnea or weakness present. Potassium replaced, will recheck potassium level this morning. Plan for a stress test in this morning. Will continue monitor.  Problem: Patient Care Overview  Goal: Plan of Care Review  Outcome: Ongoing (interventions implemented as appropriate)  Flowsheets (Taken 8/3/2020 0148)  Progress: improving  Plan of Care Reviewed With: patient

## 2020-08-03 NOTE — PLAN OF CARE
Problem: Breathing Pattern Ineffective (Adult)  Goal: Identify Related Risk Factors and Signs and Symptoms  Outcome: Ongoing (interventions implemented as appropriate)  Goal: Effective Oxygenation/Ventilation  Outcome: Ongoing (interventions implemented as appropriate)     Problem: Activity Intolerance (Adult)  Goal: Identify Related Risk Factors and Signs and Symptoms  Outcome: Ongoing (interventions implemented as appropriate)  Goal: Activity Tolerance  Outcome: Ongoing (interventions implemented as appropriate)  Goal: Effective Energy Conservation Techniques  Outcome: Ongoing (interventions implemented as appropriate)     Problem: Patient Care Overview  Goal: Plan of Care Review  Outcome: Ongoing (interventions implemented as appropriate)  Flowsheets (Taken 8/3/2020 1152 by Kaylah Hernandez)  Progress: improving  Plan of Care Reviewed With: patient  Outcome Summary: PT eval performed: Pt presents with some slight balance deficits and generalied weakness limiting function.  Pt able to ambulate 250' with SBA, slightly unstable on turns and needed CGA.  Pt limited by balance and stamina.  Recommend either HHPT or if patient comfortable with transportation OPPT to address stamina, strength and balance.   Note:   Pt is being discharged to home.   Goal: Individualization and Mutuality  Outcome: Ongoing (interventions implemented as appropriate)  Goal: Discharge Needs Assessment  Outcome: Ongoing (interventions implemented as appropriate)  Flowsheets  Taken 8/3/2020 1123 by Shereen Carreno, RN  Equipment Needed After Discharge: none  Discharge Coordination/Progress: No HH or outpt services involved in the pts care currently. [facility]  Equipment Currently Used at Home: bipap/cpap  Taken 8/2/2020 1722 by Alessio Deleon, RN  Transportation Anticipated: family or friend will provide  Patient/Family Anticipated Services at Transition: none  Patient/Family Anticipates Transition to: home with family  Goal:  Interprofessional Rounds/Family Conf  Outcome: Ongoing (interventions implemented as appropriate)

## 2020-12-28 ENCOUNTER — OFFICE VISIT (OUTPATIENT)
Dept: FAMILY MEDICINE CLINIC | Facility: CLINIC | Age: 70
End: 2020-12-28

## 2020-12-28 VITALS
TEMPERATURE: 96 F | HEART RATE: 91 BPM | RESPIRATION RATE: 20 BRPM | SYSTOLIC BLOOD PRESSURE: 160 MMHG | HEIGHT: 75 IN | BODY MASS INDEX: 29.97 KG/M2 | DIASTOLIC BLOOD PRESSURE: 70 MMHG | WEIGHT: 241 LBS | OXYGEN SATURATION: 96 %

## 2020-12-28 DIAGNOSIS — J01.01 ACUTE RECURRENT MAXILLARY SINUSITIS: ICD-10-CM

## 2020-12-28 DIAGNOSIS — M25.512 CHRONIC LEFT SHOULDER PAIN: ICD-10-CM

## 2020-12-28 DIAGNOSIS — G89.29 CHRONIC LEFT SHOULDER PAIN: ICD-10-CM

## 2020-12-28 DIAGNOSIS — R09.81 NASAL SINUS CONGESTION: Primary | ICD-10-CM

## 2020-12-28 PROCEDURE — 96372 THER/PROPH/DIAG INJ SC/IM: CPT | Performed by: PSYCHOLOGIST

## 2020-12-28 PROCEDURE — 99214 OFFICE O/P EST MOD 30 MIN: CPT | Performed by: PSYCHOLOGIST

## 2020-12-28 RX ORDER — LOVASTATIN 40 MG/1
40 TABLET ORAL
COMMUNITY
Start: 2020-09-23 | End: 2021-09-22 | Stop reason: SDUPTHER

## 2020-12-28 RX ORDER — METOPROLOL SUCCINATE 25 MG/1
12.5 TABLET, EXTENDED RELEASE ORAL DAILY
COMMUNITY
Start: 2020-10-17 | End: 2021-05-11 | Stop reason: SDUPTHER

## 2020-12-28 RX ORDER — FINASTERIDE 5 MG/1
TABLET, FILM COATED ORAL
COMMUNITY
Start: 2020-12-18 | End: 2021-09-22 | Stop reason: SDUPTHER

## 2020-12-28 RX ORDER — AZITHROMYCIN 250 MG/1
TABLET, FILM COATED ORAL
Qty: 6 TABLET | Refills: 1 | Status: SHIPPED | OUTPATIENT
Start: 2020-12-28 | End: 2021-04-05

## 2020-12-28 RX ORDER — TADALAFIL 20 MG/1
TABLET ORAL
COMMUNITY
Start: 2020-10-02 | End: 2022-09-14

## 2020-12-28 RX ORDER — KETOROLAC TROMETHAMINE 30 MG/ML
30 INJECTION, SOLUTION INTRAMUSCULAR; INTRAVENOUS ONCE
Status: COMPLETED | OUTPATIENT
Start: 2020-12-28 | End: 2020-12-28

## 2020-12-28 RX ORDER — INSULIN GLARGINE 100 [IU]/ML
54 INJECTION, SOLUTION SUBCUTANEOUS DAILY
COMMUNITY

## 2020-12-28 RX ADMIN — KETOROLAC TROMETHAMINE 30 MG: 30 INJECTION, SOLUTION INTRAMUSCULAR; INTRAVENOUS at 16:06

## 2020-12-28 NOTE — PROGRESS NOTES
Subjective   Panda Henry is a 70 y.o. male who presents today for initial evaluation For [facility] visit for sinus and left shoulder pain.  The patient states that he has been having bad sinus drainage and congestion in the back of her throat with concomitant sore throat but no fever for the past 5 to 7 days.  He states his sinuses are giving him a headache and pressure in his ears.  Problem #2 left shoulder pain more than the right although both shoulders hurt.  He denies any fall but does claim to have arthritis and now they will also swelling of his lower extremities.  But today's medially he is left shoulder unable to lift and feels a throbbing pain localized in that area.      Chief Complaint   Patient presents with   • Sinusitis     worse this last week; states hjas off and on all winter; states has allergies   • Shoulder Pain     Both shoulders, runs down to elbows x 2 weeks       History of Present Illness     The following portions of the patient's history were reviewed and updated as appropriate: allergies, current medications, past family history, past medical history, past social history, past surgical history and problem list.    Past Medical History:  Past Medical History:   Diagnosis Date   • Cancer (CMS/[facility])     basal cell carcinoma   • COPD (chronic obstructive pulmonary disease) (CMS/[facility])    • Coronary artery disease    • Depression    • Diabetes mellitus (CMS/[facility])    • GERD (gastroesophageal reflux disease)    • Hyperlipidemia    • Hypertension    • Injury of back    • Neuropathy    • Spinal stenosis        Social History:  Social History     Socioeconomic History   • Marital status:      Spouse name: Not on file   • Number of children: Not on file   • Years of education: Not on file   • Highest education level: Not on file   Tobacco Use   • Smoking status: Never Smoker   • Smokeless tobacco: Current User     Types: Chew   Substance and Sexual Activity   • Alcohol use: Never     Frequency:  Never     Comment: Drank some years ago   • Drug use: Never   • Sexual activity: Defer   Social History Narrative    Live in [place] [facility] with wife and grandson    Retired        Family History:  Family History   Problem Relation Age of Onset   • Diabetes Mother    • Stroke Mother    • Coronary artery disease Mother    • Lung cancer Father    • Coronary artery disease Father    • Heart failure Sister    • Coronary artery disease Brother    • Aneurysm Brother         brain   • Cancer Brother         unknown location   • Alcohol abuse Brother        Past Surgical History:  Past Surgical History:   Procedure Laterality Date   • BLEPHAROPLASTY Bilateral    • CARDIAC CATHETERIZATION     • CORONARY STENT PLACEMENT     • SHOULDER ROTATOR CUFF REPAIR Right    • SINUPLASTY     • SKIN BIOPSY         Problem List:  Patient Active Problem List   Diagnosis   • Generalized weakness   • Chest pain   • Hypertension   • Coronary artery disease   • Diabetes mellitus (CMS/HCC)   • Chronic back pain   • Hypokalemia       Allergy:   Allergies   Allergen Reactions   • Dust Mite Extract Headache   • Grass Headache   • Molds & Smuts Headache   • Pollen Extract Headache        Current Medications:   Current Outpatient Medications   Medication Sig Dispense Refill   • albuterol sulfate  (90 Base) MCG/ACT inhaler Inhale 2 puffs Every 4 (Four) Hours As Needed for Wheezing.     • amLODIPine (NORVASC) 10 MG tablet Take 10 mg by mouth Daily.     • aspirin 81 MG EC tablet Take 81 mg by mouth Daily.     • budesonide-formoterol (SYMBICORT) 160-4.5 MCG/ACT inhaler Inhale 2 puffs 2 (Two) Times a Day.     • celecoxib (CeleBREX) 100 MG capsule Take 75 mg by mouth 2 (Two) Times a Day As Needed for Mild Pain .     • clopidogrel (PLAVIX) 75 MG tablet Take 75 mg by mouth Daily.     • docusate sodium (COLACE) 250 MG capsule Take 250 mg by mouth Daily As Needed.     • doxepin (SINEquan) 100 MG capsule Take 100 mg by mouth Every  Night.     • escitalopram (LEXAPRO) 20 MG tablet Take 20 mg by mouth Daily.     • finasteride (PROSCAR) 5 MG tablet      • gabapentin (NEURONTIN) 300 MG capsule Take 300 mg by mouth 3 (Three) Times a Day.     • insulin aspart (novoLOG FLEXPEN) 100 UNIT/ML solution pen-injector sc pen Inject 14 Units under the skin into the appropriate area as directed 2 (Two) Times a Day Before Meals.     • insulin glargine (LANTUS, SEMGLEE) 100 UNIT/ML injection Inject 42 Units under the skin into the appropriate area as directed 2 (Two) Times a Day.     • insulin NPH-insulin regular (humuLIN 70/30,novoLIN 70/30) (70-30) 100 UNIT/ML injection Inject  under the skin into the appropriate area as directed 2 (Two) Times a Day With Meals.     • ketotifen (ZADITOR) 0.025 % ophthalmic solution 1 drop 2 (Two) Times a Day.     • levocetirizine (XYZAL) 5 MG tablet Take 5 mg by mouth Every Evening.     • lisinopril (PRINIVIL,ZESTRIL) 40 MG tablet Take 40 mg by mouth Daily.     • LORazepam (ATIVAN) 2 MG tablet Take 2 mg by mouth 3 (Three) Times a Day.     • lovastatin (MEVACOR) 40 MG tablet Take 40 mg by mouth every night at bedtime.     • metoprolol succinate XL (TOPROL-XL) 25 MG 24 hr tablet Take 12.5 mg by mouth Daily.     • montelukast (SINGULAIR) 10 MG tablet Take 10 mg by mouth Every Night.     • nitroglycerin (NITROSTAT) 0.4 MG SL tablet Place 0.4 mg under the tongue Every 5 (Five) Minutes As Needed for Chest Pain. Take no more than 3 doses in 15 minutes.     • omeprazole (priLOSEC) 40 MG capsule Take 40 mg by mouth Daily.     • simethicone (MYLICON) 80 MG chewable tablet Chew 80 mg Every 6 (Six) Hours As Needed for Flatulence.     • tadalafil (CIALIS) 20 MG tablet TAKE 1 TABLET BY MOUTH ONCE DAILY BEFORE SEX     • tamsulosin (FLOMAX) 0.4 MG capsule 24 hr capsule Take 1 capsule by mouth Daily.     • triamcinolone (KENALOG) 0.1 % cream Apply  topically to the appropriate area as directed 2 (Two) Times a Day.     • azithromycin  "(Zithromax) 250 MG tablet Take 2 tablets the first day, then 1 tablet daily for 4 days. 6 tablet 1   • diclofenac (VOLTAREN) 50 MG EC tablet Take 1 tablet by mouth 2 (Two) Times a Day. Take with food 14 tablet 1     No current facility-administered medications for this visit.        Objective     VITAL SIGNS:  /70 (BP Location: Right arm, Patient Position: Sitting, Cuff Size: Adult)   Pulse 91   Temp 96 °F (35.6 °C) (Temporal)   Resp 20   Ht 190.5 cm (75\")   Wt 109 kg (241 lb)   SpO2 96%   BMI 30.12 kg/m²     Review of Symptoms:    Review of Systems   Constitutional: Negative for chills, fatigue and fever.   HENT: Positive for ear pain, postnasal drip, sore throat and swollen glands. Negative for ear discharge and trouble swallowing.    Eyes: Negative for discharge.   Respiratory: Negative for cough and shortness of breath.    Cardiovascular: Negative for chest pain.   Gastrointestinal: Negative for abdominal pain and vomiting.   Genitourinary: Negative for dysuria, frequency, urgency and urinary incontinence.   Musculoskeletal: Negative for back pain, joint swelling and neck pain.   Skin: Negative for rash.   Neurological: Negative for dizziness, seizures and weakness.   Hematological: Negative for adenopathy.   Psychiatric/Behavioral: Negative for depressed mood.       PHYSICAL EXAM:  Physical Exam  Vitals signs and nursing note reviewed.   Constitutional:       General: He is awake.      Appearance: Normal appearance. He is well-developed, well-groomed and normal weight.   HENT:      Head: Normocephalic and atraumatic.      Jaw: There is normal jaw occlusion.      Nose: Nose normal.      Mouth/Throat:      Mouth: Mucous membranes are dry.      Pharynx: Oropharynx is clear.   Eyes:      General: Lids are normal. Vision grossly intact. Gaze aligned appropriately.      Extraocular Movements: Extraocular movements intact.      Conjunctiva/sclera: Conjunctivae normal.      Pupils: Pupils are equal, round, " and reactive to light.   Neck:      Musculoskeletal: Full passive range of motion without pain, normal range of motion and neck supple.      Trachea: Trachea and phonation normal.   Cardiovascular:      Rate and Rhythm: Normal rate and regular rhythm.      Pulses: Normal pulses.      Heart sounds: Normal heart sounds.   Pulmonary:      Effort: Pulmonary effort is normal.      Breath sounds: Normal breath sounds.   Abdominal:      General: Abdomen is flat. Bowel sounds are normal.      Palpations: Abdomen is soft.   Genitourinary:     Rectum: Normal.   Musculoskeletal:      Left shoulder: He exhibits decreased range of motion, tenderness, pain and spasm. He exhibits no deformity.   Lymphadenopathy:      Cervical: Cervical adenopathy present.   Skin:     General: Skin is warm and dry.      Capillary Refill: Capillary refill takes more than 3 seconds.   Neurological:      General: No focal deficit present.      Mental Status: He is alert and oriented to person, place, and time.      Cranial Nerves: Cranial nerves are intact.      Sensory: Sensation is intact.      Motor: Motor function is intact.      Coordination: Coordination is intact.      Gait: Gait is intact.      Deep Tendon Reflexes: Reflexes are normal and symmetric.   Psychiatric:         Attention and Perception: Attention and perception normal.         Mood and Affect: Mood and affect normal.         Speech: Speech normal.         Behavior: Behavior normal.         Thought Content: Thought content normal.         Cognition and Memory: Cognition and memory normal.         Judgment: Judgment normal.         Lab Results:   No visits with results within 1 Month(s) from this visit.   Latest known visit with results is:   Admission on 08/02/2020, Discharged on 08/03/2020   Component Date Value Ref Range Status   • Glucose 08/02/2020 154* 65 - 99 mg/dL Final   • BUN 08/02/2020 8  8 - 23 mg/dL Final   • Creatinine 08/02/2020 0.96  0.76 - 1.27 mg/dL Final   • Sodium  08/02/2020 137  136 - 145 mmol/L Final   • Potassium 08/02/2020 3.4* 3.5 - 5.2 mmol/L Final    Specimen hemolyzed.  Results may be affected.   • Chloride 08/02/2020 100  98 - 107 mmol/L Final   • CO2 08/02/2020 25.0  22.0 - 29.0 mmol/L Final   • Calcium 08/02/2020 8.9  8.6 - 10.5 mg/dL Final   • Total Protein 08/02/2020 7.0  6.0 - 8.5 g/dL Final   • Albumin 08/02/2020 4.20  3.50 - 5.20 g/dL Final   • ALT (SGPT) 08/02/2020 20  1 - 41 U/L Final   • AST (SGOT) 08/02/2020 17  1 - 40 U/L Final   • Alkaline Phosphatase 08/02/2020 102  39 - 117 U/L Final   • Total Bilirubin 08/02/2020 0.4  0.0 - 1.2 mg/dL Final   • eGFR Non African Amer 08/02/2020 77  >60 mL/min/1.73 Final   • Globulin 08/02/2020 2.8  gm/dL Final   • A/G Ratio 08/02/2020 1.5  g/dL Final   • BUN/Creatinine Ratio 08/02/2020 8.3  7.0 - 25.0 Final   • Anion Gap 08/02/2020 12.0  5.0 - 15.0 mmol/L Final   • Troponin T 08/02/2020 <0.010  0.000 - 0.030 ng/mL Final   • Magnesium 08/02/2020 2.1  1.6 - 2.4 mg/dL Final   • Color, UA 08/02/2020 Yellow  Yellow, Straw Final   • Appearance, UA 08/02/2020 Clear  Clear Final   • pH, UA 08/02/2020 6.0  5.0 - 8.0 Final   • Specific Gravity, UA 08/02/2020 <=1.005  1.001 - 1.030 Final   • Glucose, UA 08/02/2020 250 mg/dL (1+)* Negative Final   • Ketones, UA 08/02/2020 Negative  Negative Final   • Bilirubin, UA 08/02/2020 Negative  Negative Final   • Blood, UA 08/02/2020 Negative  Negative Final   • Protein, UA 08/02/2020 Negative  Negative Final   • Leuk Esterase, UA 08/02/2020 Negative  Negative Final   • Nitrite, UA 08/02/2020 Negative  Negative Final   • Urobilinogen, UA 08/02/2020 0.2 E.U./dL  0.2 - 1.0 E.U./dL Final   • Extra Tube 08/02/2020 hold for add-on   Final    Auto resulted   • Extra Tube 08/02/2020 Hold for add-ons.   Final    Auto resulted.   • Extra Tube 08/02/2020 hold for add-on   Final    Auto resulted   • Extra Tube 08/02/2020 Hold for add-ons.   Final    Auto resulted.   • WBC 08/02/2020 4.52  3.40 - 10.80  10*3/mm3 Final   • RBC 08/02/2020 5.09  4.14 - 5.80 10*6/mm3 Final   • Hemoglobin 08/02/2020 15.3  13.0 - 17.7 g/dL Final   • Hematocrit 08/02/2020 45.2  37.5 - 51.0 % Final   • MCV 08/02/2020 88.8  79.0 - 97.0 fL Final   • MCH 08/02/2020 30.1  26.6 - 33.0 pg Final   • MCHC 08/02/2020 33.8  31.5 - 35.7 g/dL Final   • RDW 08/02/2020 12.4  12.3 - 15.4 % Final   • RDW-SD 08/02/2020 40.6  37.0 - 54.0 fl Final   • MPV 08/02/2020 10.4  6.0 - 12.0 fL Final   • Platelets 08/02/2020 213  140 - 450 10*3/mm3 Final   • Neutrophil % 08/02/2020 53.7  42.7 - 76.0 % Final   • Lymphocyte % 08/02/2020 25.9  19.6 - 45.3 % Final   • Monocyte % 08/02/2020 13.1* 5.0 - 12.0 % Final   • Eosinophil % 08/02/2020 6.2  0.3 - 6.2 % Final   • Basophil % 08/02/2020 0.9  0.0 - 1.5 % Final   • Immature Grans % 08/02/2020 0.2  0.0 - 0.5 % Final   • Neutrophils, Absolute 08/02/2020 2.43  1.70 - 7.00 10*3/mm3 Final   • Lymphocytes, Absolute 08/02/2020 1.17  0.70 - 3.10 10*3/mm3 Final   • Monocytes, Absolute 08/02/2020 0.59  0.10 - 0.90 10*3/mm3 Final   • Eosinophils, Absolute 08/02/2020 0.28  0.00 - 0.40 10*3/mm3 Final   • Basophils, Absolute 08/02/2020 0.04  0.00 - 0.20 10*3/mm3 Final   • Immature Grans, Absolute 08/02/2020 0.01  0.00 - 0.05 10*3/mm3 Final   • nRBC 08/02/2020 0.0  0.0 - 0.2 /100 WBC Final   • Creatine Kinase 08/02/2020 56  20 - 200 U/L Final   • C-Reactive Protein 08/02/2020 0.27  0.00 - 0.50 mg/dL Final   • Sed Rate 08/02/2020 11  0 - 20 mm/hr Final   • Troponin T 08/02/2020 <0.010  0.000 - 0.030 ng/mL Final   • Hemoglobin A1C 08/02/2020 7.90* 4.80 - 5.60 % Final   • COVID19 08/02/2020 Not Detected  Not Detected - Ref. Range Final   • Glucose 08/02/2020 152* 70 - 130 mg/dL Final   • Glucose 08/02/2020 202* 70 - 130 mg/dL Final   • Glucose 08/03/2020 168* 65 - 99 mg/dL Final   • BUN 08/03/2020 8  8 - 23 mg/dL Final   • Creatinine 08/03/2020 0.90  0.76 - 1.27 mg/dL Final   • Sodium 08/03/2020 141  136 - 145 mmol/L Final   • Potassium  08/03/2020 4.1  3.5 - 5.2 mmol/L Final    Specimen hemolyzed.  Results may be affected.   • Chloride 08/03/2020 107  98 - 107 mmol/L Final   • CO2 08/03/2020 25.0  22.0 - 29.0 mmol/L Final   • Calcium 08/03/2020 8.8  8.6 - 10.5 mg/dL Final   • eGFR Non African Amer 08/03/2020 83  >60 mL/min/1.73 Final   • BUN/Creatinine Ratio 08/03/2020 8.9  7.0 - 25.0 Final   • Anion Gap 08/03/2020 9.0  5.0 - 15.0 mmol/L Final   • WBC 08/03/2020 3.89  3.40 - 10.80 10*3/mm3 Final   • RBC 08/03/2020 5.00  4.14 - 5.80 10*6/mm3 Final   • Hemoglobin 08/03/2020 14.7  13.0 - 17.7 g/dL Final   • Hematocrit 08/03/2020 45.2  37.5 - 51.0 % Final   • MCV 08/03/2020 90.4  79.0 - 97.0 fL Final   • MCH 08/03/2020 29.4  26.6 - 33.0 pg Final   • MCHC 08/03/2020 32.5  31.5 - 35.7 g/dL Final   • RDW 08/03/2020 12.8  12.3 - 15.4 % Final   • RDW-SD 08/03/2020 42.4  37.0 - 54.0 fl Final   • MPV 08/03/2020 10.4  6.0 - 12.0 fL Final   • Platelets 08/03/2020 226  140 - 450 10*3/mm3 Final   • Neutrophil % 08/03/2020 43.3  42.7 - 76.0 % Final   • Lymphocyte % 08/03/2020 34.4  19.6 - 45.3 % Final   • Monocyte % 08/03/2020 13.6* 5.0 - 12.0 % Final   • Eosinophil % 08/03/2020 7.2* 0.3 - 6.2 % Final   • Basophil % 08/03/2020 1.0  0.0 - 1.5 % Final   • Immature Grans % 08/03/2020 0.5  0.0 - 0.5 % Final   • Neutrophils, Absolute 08/03/2020 1.68* 1.70 - 7.00 10*3/mm3 Final   • Lymphocytes, Absolute 08/03/2020 1.34  0.70 - 3.10 10*3/mm3 Final   • Monocytes, Absolute 08/03/2020 0.53  0.10 - 0.90 10*3/mm3 Final   • Eosinophils, Absolute 08/03/2020 0.28  0.00 - 0.40 10*3/mm3 Final   • Basophils, Absolute 08/03/2020 0.04  0.00 - 0.20 10*3/mm3 Final   • Immature Grans, Absolute 08/03/2020 0.02  0.00 - 0.05 10*3/mm3 Final   • nRBC 08/03/2020 0.0  0.0 - 0.2 /100 WBC Final   • Troponin T 08/03/2020 <0.010  0.000 - 0.030 ng/mL Final   • Glucose 08/03/2020 150* 70 - 130 mg/dL Final   •  CV STRESS PROTOCOL 1 08/03/2020 Pharmacologic   Final   • Stage 1 08/03/2020 1   Final    • Duration Min Stage 1 08/03/2020 1   Final   • Duration Sec Stage 1 08/03/2020 0   Final   • Stress Dose Regadenoson Stage 1 08/03/2020 0.4   Final   • Stress Comments Stage 1 08/03/2020 10 sec bolus injection   Final   • Stage 2 08/03/2020 2   Final   • Duration Min Stage 2 08/03/2020 1   Final   • Duration Sec Stage 2 08/03/2020 0   Final   • Stage 3 08/03/2020 3   Final   • Duration Min Stage 3 08/03/2020 1   Final   • Duration Sec Stage 3 08/03/2020 0   Final   • Stage 4 08/03/2020 4   Final   • Duration Min Stage 4 08/03/2020 1   Final   • Duration Sec Stage 4 08/03/2020 0   Final   • Target HR (85%) 08/03/2020 128  bpm Final   • Max. Pred. HR (100%) 08/03/2020 150  bpm Final   • Exercise duration (min) 08/03/2020 4  min Final   • Exercise duration (sec) 08/03/2020 0  sec Final   • Estimated workload 08/03/2020 1.0  METS Final   • Baseline HR 08/03/2020 54  bpm Final   • Baseline BP 08/03/2020 148/66  mmHg Final   • O2 sat rest 08/03/2020 97  % Final   • HR Stage 1 08/03/2020 66   Final   • BP Stage 1 08/03/2020 137/66   Final   • O2 Stage 1 08/03/2020 98   Final   • HR Stage 2 08/03/2020 76   Final   • BP Stage 2 08/03/2020 117/57   Final   • O2 Stage 2 08/03/2020 99   Final   • HR Stage 3 08/03/2020 72   Final   • O2 Stage 3 08/03/2020 99   Final   • HR Stage 4 08/03/2020 70   Final   • BP Stage 4 08/03/2020 136/57   Final   • O2 Stage 4 08/03/2020 98   Final   • Peak HR 08/03/2020 68  bpm Final   • Percent Max Pred HR 08/03/2020 45.33  % Final   • Percent Target HR 08/03/2020 53  % Final   • Peak BP 08/03/2020 117/57  mmHg Final   • O2 sat peak 08/03/2020 99  % Final   • Recovery HR 08/03/2020 64  bpm Final   • Recovery BP 08/03/2020 122/57  mmHg Final   • Recovery O2 08/03/2020 97  % Final   • Nuclear Prior Study 08/03/2020 3   Final   • Nuc Stress EF 08/03/2020 54  % Final   • Glucose 08/03/2020 119  70 - 130 mg/dL Final   • Creatine Kinase 08/03/2020 45  20 - 200 U/L Final   • Myoglobin 08/03/2020 29.9   28.0 - 72.0 ng/mL Final   • Total Cholesterol 08/03/2020 139  0 - 200 mg/dL Final   • Triglycerides 08/03/2020 194* 0 - 150 mg/dL Final   • HDL Cholesterol 08/03/2020 30* 40 - 60 mg/dL Final   • LDL Cholesterol  08/03/2020 70  0 - 100 mg/dL Final   • VLDL Cholesterol 08/03/2020 38.8  mg/dL Final   • LDL/HDL Ratio 08/03/2020 2.34   Final       Assessment/Plan     Problem List Items Addressed This Visit     None      Visit Diagnoses     Nasal sinus congestion    -  Primary    Acute recurrent maxillary sinusitis        Chronic left shoulder pain        Relevant Medications    ketorolac (TORADOL) injection 30 mg (Completed)          PHQ-2/PHQ-9 Depression Screening 12/28/2020   Little interest or pleasure in doing things 3   Feeling down, depressed, or hopeless 1   Trouble falling or staying asleep, or sleeping too much 2   Feeling tired or having little energy 3   Poor appetite or overeating 1   Feeling bad about yourself - or that you are a failure or have let yourself or your family down 0   Trouble concentrating on things, such as reading the newspaper or watching television 2   Moving or speaking so slowly that other people could have noticed. Or the opposite - being so fidgety or restless that you have been moving around a lot more than usual 0   Thoughts that you would be better off dead, or of hurting yourself in some way 0   Total Score 12   If you checked off any problems, how difficult have these problems made it for you to do your work, take care of things at home, or get along with other people? Very difficult       Patient's Body mass index is 30.12 kg/m². BMI is above normal parameters. Recommendations include: exercise counseling and nutrition counseling.   (Normal BMI:  18.5-24.9, OW 25-29.9, Obesity 30 or greater)    Panda Henry  reports that he has never smoked. His smokeless tobacco use includes chew.. I have educated him on the risk of diseases from using tobacco products such as cancer, COPD and  heart disease.       I spent 40 minutes counseling the patient, and reviewing his meds.  He would like to get set up as her PCP to further discuss those with the patient.  Problem #1 on his sinuses we will be giving him some medication for that.  Should he experience any fever to please take Tylenol every 4 hours as needed as needed.  Problem #2 chronic left shoulder pain.  He claims that both shoulder actually hurts today but right now left more than the right is hurting the most.  He is unable to do full range of motion on physical exam.  He does claim he has stopped taking his arthritis medication.      Visit Diagnoses:    ICD-10-CM ICD-9-CM   1. Nasal sinus congestion  R09.81 478.19   2. Acute recurrent maxillary sinusitis  J01.01 461.0   3. Chronic left shoulder pain  M25.512 719.41    G89.29 338.29         MEDICATION ISSUES:  Discussed medication options and treatment plan of prescribed medication as well as the risks, benefits, and side effects including potential falls, possible impaired driving and metabolic adversities among others. Patient is agreeable to call the office with any worsening of symptoms or onset of side effects. Patient is agreeable to call 911 or go to the nearest ER should he/she begin having SI/HI.     MEDS ORDERED DURING VISIT:  New Medications Ordered This Visit   Medications   • ketorolac (TORADOL) injection 30 mg   • diclofenac (VOLTAREN) 50 MG EC tablet     Sig: Take 1 tablet by mouth 2 (Two) Times a Day. Take with food     Dispense:  14 tablet     Refill:  1   • azithromycin (Zithromax) 250 MG tablet     Sig: Take 2 tablets the first day, then 1 tablet daily for 4 days.     Dispense:  6 tablet     Refill:  1       FOLLOW UP:   Return if symptoms worsen or fail to improve/ call f he wants set up as PCP.           This document has been electronically signed by Rupert Gary MD   December 30, 2020 10:06 EST    Part of this note may be an electronic transcription/translation  of spoken language to printed text using the Dragon Dictation System.

## 2021-04-05 ENCOUNTER — OFFICE VISIT (OUTPATIENT)
Dept: FAMILY MEDICINE CLINIC | Facility: CLINIC | Age: 71
End: 2021-04-05

## 2021-04-05 VITALS
RESPIRATION RATE: 20 BRPM | TEMPERATURE: 98.2 F | HEIGHT: 75 IN | OXYGEN SATURATION: 97 % | BODY MASS INDEX: 30.04 KG/M2 | SYSTOLIC BLOOD PRESSURE: 166 MMHG | DIASTOLIC BLOOD PRESSURE: 78 MMHG | WEIGHT: 241.6 LBS | HEART RATE: 92 BPM

## 2021-04-05 DIAGNOSIS — Z00.00 ENCOUNTER FOR MEDICAL EXAMINATION TO ESTABLISH CARE: Primary | ICD-10-CM

## 2021-04-05 DIAGNOSIS — Z79.4 TYPE 2 DIABETES MELLITUS WITH DIABETIC NEUROPATHY, WITH LONG-TERM CURRENT USE OF INSULIN (HCC): ICD-10-CM

## 2021-04-05 DIAGNOSIS — E11.40 TYPE 2 DIABETES MELLITUS WITH DIABETIC NEUROPATHY, WITH LONG-TERM CURRENT USE OF INSULIN (HCC): ICD-10-CM

## 2021-04-05 DIAGNOSIS — I25.10 CORONARY ARTERY DISEASE INVOLVING NATIVE CORONARY ARTERY OF NATIVE HEART WITHOUT ANGINA PECTORIS: ICD-10-CM

## 2021-04-05 DIAGNOSIS — F33.0 MILD EPISODE OF RECURRENT MAJOR DEPRESSIVE DISORDER (HCC): ICD-10-CM

## 2021-04-05 DIAGNOSIS — G89.29 CHRONIC MIDLINE LOW BACK PAIN WITHOUT SCIATICA: ICD-10-CM

## 2021-04-05 DIAGNOSIS — I10 ESSENTIAL HYPERTENSION: ICD-10-CM

## 2021-04-05 DIAGNOSIS — M54.50 CHRONIC MIDLINE LOW BACK PAIN WITHOUT SCIATICA: ICD-10-CM

## 2021-04-05 DIAGNOSIS — F41.9 ANXIETY: ICD-10-CM

## 2021-04-05 DIAGNOSIS — J44.9 CHRONIC OBSTRUCTIVE PULMONARY DISEASE, UNSPECIFIED COPD TYPE (HCC): ICD-10-CM

## 2021-04-05 DIAGNOSIS — N40.0 BENIGN PROSTATIC HYPERPLASIA WITHOUT LOWER URINARY TRACT SYMPTOMS: ICD-10-CM

## 2021-04-05 PROBLEM — K21.9 GASTROESOPHAGEAL REFLUX DISEASE WITHOUT ESOPHAGITIS: Status: ACTIVE | Noted: 2021-04-05

## 2021-04-05 PROBLEM — M54.2 CHRONIC NECK PAIN: Status: ACTIVE | Noted: 2021-04-05

## 2021-04-05 PROCEDURE — 86803 HEPATITIS C AB TEST: CPT | Performed by: FAMILY MEDICINE

## 2021-04-05 PROCEDURE — 99214 OFFICE O/P EST MOD 30 MIN: CPT | Performed by: FAMILY MEDICINE

## 2021-04-05 PROCEDURE — 83036 HEMOGLOBIN GLYCOSYLATED A1C: CPT | Performed by: FAMILY MEDICINE

## 2021-04-05 PROCEDURE — 82043 UR ALBUMIN QUANTITATIVE: CPT | Performed by: FAMILY MEDICINE

## 2021-04-05 PROCEDURE — 96372 THER/PROPH/DIAG INJ SC/IM: CPT | Performed by: FAMILY MEDICINE

## 2021-04-05 PROCEDURE — 36415 COLL VENOUS BLD VENIPUNCTURE: CPT | Performed by: FAMILY MEDICINE

## 2021-04-05 RX ORDER — HYDROXYZINE HYDROCHLORIDE 25 MG/1
25 TABLET, FILM COATED ORAL 3 TIMES DAILY PRN
Qty: 21 TABLET | Refills: 0 | Status: SHIPPED | OUTPATIENT
Start: 2021-04-05 | End: 2021-04-12

## 2021-04-05 RX ORDER — CYCLOBENZAPRINE HCL 10 MG
10 TABLET ORAL 3 TIMES DAILY PRN
Qty: 21 TABLET | Refills: 0 | Status: SHIPPED | OUTPATIENT
Start: 2021-04-05 | End: 2021-04-12

## 2021-04-05 RX ORDER — KETOROLAC TROMETHAMINE 30 MG/ML
30 INJECTION, SOLUTION INTRAMUSCULAR; INTRAVENOUS ONCE
Status: COMPLETED | OUTPATIENT
Start: 2021-04-05 | End: 2021-04-05

## 2021-04-05 RX ORDER — KETOROLAC TROMETHAMINE 30 MG/ML
30 INJECTION, SOLUTION INTRAMUSCULAR; INTRAVENOUS ONCE
Status: DISCONTINUED | OUTPATIENT
Start: 2021-04-05 | End: 2021-04-05

## 2021-04-05 RX ADMIN — KETOROLAC TROMETHAMINE 30 MG: 30 INJECTION, SOLUTION INTRAMUSCULAR; INTRAVENOUS at 14:04

## 2021-04-05 NOTE — PROGRESS NOTES
"Chief Complaint  Pain (Left arm x 1 month), Spasms (In lower back-radiates to left side( x 2 months)), and Right ear blocked (States feels like fluid sloshing since Dec.(took antibiotic helped some but came back))    Van Henry presents to [facility]  The patient is here to get established as a primary care patient, the patient stated that he just moved here about 2 months ago from [facility].  The patient has a history of diabetes type 2 on insulin, hypertension, coronary artery disease, anxiety, depression, chronic neck and low back pain, COPD, BPH, and GERD.  [facility] except for the back and neck pain, anxiety and depression which is being managed by his PCP.  The patient is taking Xanax, escitalopram and doxepin which is being given by his previous PCP.  His PCP is also giving him the gabapentin for his back pain and leg numbness and tingling.  However the patient does not have any medical records at this time so we can review and verify everything that he is telling us.  The patient is also here today because the low back pain has been worse in the last couple of weeks with some muscle spasms on the lower back.      Objective   Vital Signs:   /78 (BP Location: Right arm, Patient Position: Sitting, Cuff Size: Adult)   Pulse 92   Temp 98.2 °F (36.8 °C) (Temporal)   Resp 20   Ht 190.5 cm (75\")   Wt 110 kg (241 lb 9.6 oz)   SpO2 97%   BMI 30.20 kg/m²     Physical Exam  Vitals and nursing note reviewed.   Constitutional:       General: He is not in acute distress.     Appearance: Normal appearance. He is well-developed and well-groomed. He is obese.   HENT:      Head: Normocephalic and atraumatic.      Jaw: No tenderness or pain on movement.      Salivary Glands: Right salivary gland is not diffusely enlarged. Left salivary gland is not diffusely " enlarged.      Right Ear: Tympanic membrane and ear canal normal.      Left Ear: Tympanic membrane and ear canal normal.      Nose: No congestion or rhinorrhea.      Mouth/Throat:      Mouth: Mucous membranes are moist.      Pharynx: No oropharyngeal exudate or posterior oropharyngeal erythema.   Eyes:      General: Lids are normal. No allergic shiner or scleral icterus.     Conjunctiva/sclera: Conjunctivae normal.      Pupils: Pupils are equal, round, and reactive to light.   Neck:      Thyroid: No thyroid mass, thyromegaly or thyroid tenderness.      Trachea: Trachea normal.   Cardiovascular:      Rate and Rhythm: Normal rate and regular rhythm.  No extrasystoles are present.     Pulses: Normal pulses.      Heart sounds: Normal heart sounds. No murmur heard.     Pulmonary:      Effort: Pulmonary effort is normal. No respiratory distress.      Breath sounds: Normal breath sounds. No decreased breath sounds, wheezing, rhonchi or rales.   Chest:      Breasts:         Right: Normal.         Left: Normal.   Abdominal:      General: Abdomen is protuberant. Bowel sounds are normal.      Palpations: Abdomen is soft.      Tenderness: There is no abdominal tenderness. There is no right CVA tenderness, left CVA tenderness, guarding or rebound.   Genitourinary:     Penis: Uncircumcised.    Musculoskeletal:      Cervical back: Normal and neck supple.      Thoracic back: Normal.      Lumbar back: Spasms ( Left lumbar paravertebral area), tenderness (Left lumbar lumbar paravertebral tenderness ) and bony tenderness ( Lumbar vertebral tenderness) present.      Right lower leg: No edema.      Left lower leg: No edema.      Right foot: Normal range of motion. No deformity or foot drop.      Left foot: Normal range of motion. No deformity or foot drop.   Feet:      Right foot:      Protective Sensation: 10 sites tested. 4 sites sensed.      Skin integrity: No ulcer, blister, skin breakdown or erythema.      Toenail Condition: Right  toenails are normal.      Left foot:      Protective Sensation: 10 sites tested. 8 sites sensed.      Skin integrity: No ulcer, blister, skin breakdown or erythema.      Toenail Condition: Left toenails are normal.   Lymphadenopathy:      Head:      Right side of head: No submandibular, preauricular, posterior auricular or occipital adenopathy.      Left side of head: No submandibular, preauricular, posterior auricular or occipital adenopathy.      Cervical: No cervical adenopathy.      Upper Body:      Right upper body: No supraclavicular or axillary adenopathy.      Left upper body: No supraclavicular or axillary adenopathy.   Skin:     General: Skin is warm.      Nails: There is no clubbing.   Neurological:      General: No focal deficit present.      Mental Status: He is alert and oriented to person, place, and time.   Psychiatric:         Mood and Affect: Mood normal.         Behavior: Behavior normal.     Lumbar x-ray was performed this visit.  Indication: Low back pain  Interpretation/Findings: Lumbar vertebral spurring, no fractures or dislocations  No comparison or previous X-ray was looked at today.   The x-ray will be officially read by the radiologist    Result Review :                 Assessment and Plan    Diagnoses and all orders for this visit:    1. Encounter for medical examination to establish care (Primary)  -     Ambulatory Referral For Screening Colonoscopy  -     Hepatitis C Antibody    2. Essential hypertension    3. Type 2 diabetes mellitus with diabetic neuropathy, with long-term current use of insulin (CMS/HCC)  -     Hemoglobin A1c  -     MicroAlbumin, Urine, Random - Urine, Clean Catch    4. Mild episode of recurrent major depressive disorder (CMS/HCC)  -     Ambulatory Referral to Psychiatry    5. Anxiety  -     Ambulatory Referral to Psychiatry    6. Chronic midline low back pain without sciatica  -     diclofenac (VOLTAREN) 50 MG EC tablet; Take 1 tablet by mouth 2 (Two) Times a Day.  Take with food  Dispense: 14 tablet; Refill: 1  -     cyclobenzaprine (FLEXERIL) 10 MG tablet; Take 1 tablet by mouth 3 (Three) Times a Day As Needed for Muscle Spasms for up to 7 days.  Dispense: 21 tablet; Refill: 0  -     XR Spine Lumbar 2 or 3 View (In Office)  -     Discontinue: ketorolac (TORADOL) injection 30 mg  -     ketorolac (TORADOL) injection 30 mg    7. Chronic obstructive pulmonary disease, unspecified COPD type (CMS/HCC)    8. Benign prostatic hyperplasia without lower urinary tract symptoms    9. Coronary artery disease involving native coronary artery of native heart without angina pectoris    Other orders  -     hydrOXYzine (ATARAX) 25 MG tablet; Take 1 tablet by mouth 3 (Three) Times a Day As Needed for Itching for up to 7 days.  Dispense: 21 tablet; Refill: 0        Follow Up   Return in about 1 month (around 5/5/2021).  Patient was given instructions and counseling regarding his condition or for health maintenance advice. Please see specific information pulled into the AVS if appropriate.     The preventive exam has been reviewed in detail.  The patient has been fully counseled on preventative guidelines for vaccines, cancer screenings, and other health maintenance needs.   The patient has been counseled on guidelines for maintaining a lifestyle to promote good health and to minimize chronic diseases.  The patient has been assisted with scheduling these healthcare procedures for the coming year and given a written document of health maintenance and anticipatory guidance for age with the AVS.    The patient was advised to get his medical records so we can review them.  He was referred to the psychiatrist.  We will give the patient a prescription for diclofenac 50 p.o. twice daily and Flexeril 10 mg 1 p.o. 3 times daily as needed.  The patient was advised to follow-up in 1 month for his annual wellness visit.  The patient was counseled on the importance of diet, exercise and compliance.

## 2021-04-05 NOTE — PATIENT INSTRUCTIONS
Preventive Care 65 Years and Older, Male  Preventive care refers to lifestyle choices and visits with your health care provider that can promote health and wellness. This includes:  · A yearly physical exam. This is also called an annual well check.  · Regular dental and eye exams.  · Immunizations.  · Screening for certain conditions.  · Healthy lifestyle choices, such as diet and exercise.  What can I expect for my preventive care visit?  Physical exam  Your health care provider will check:  · Height and weight. These may be used to calculate body mass index (BMI), which is a measurement that tells if you are at a healthy weight.  · Heart rate and blood pressure.  · Your skin for abnormal spots.  Counseling  Your health care provider may ask you questions about:  · Alcohol, tobacco, and drug use.  · Emotional well-being.  · Home and relationship well-being.  · Sexual activity.  · Eating habits.  · History of falls.  · Memory and ability to understand (cognition).  · Work and work environment.  What immunizations do I need?    Influenza (flu) vaccine  · This is recommended every year.  Tetanus, diphtheria, and pertussis (Tdap) vaccine  · You may need a Td booster every 10 years.  Varicella (chickenpox) vaccine  · You may need this vaccine if you have not already been vaccinated.  Zoster (shingles) vaccine  · You may need this after age 60.  Pneumococcal conjugate (PCV13) vaccine  · One dose is recommended after age 65.  Pneumococcal polysaccharide (PPSV23) vaccine  · One dose is recommended after age 65.  Measles, mumps, and rubella (MMR) vaccine  · You may need at least one dose of MMR if you were born in 1957 or later. You may also need a second dose.  Meningococcal conjugate (MenACWY) vaccine  · You may need this if you have certain conditions.  Hepatitis A vaccine  · You may need this if you have certain conditions or if you travel or work in places where you may be exposed to hepatitis A.  Hepatitis B  vaccine  · You may need this if you have certain conditions or if you travel or work in places where you may be exposed to hepatitis B.  Haemophilus influenzae type b (Hib) vaccine  · You may need this if you have certain conditions.  You may receive vaccines as individual doses or as more than one vaccine together in one shot (combination vaccines). Talk with your health care provider about the risks and benefits of combination vaccines.  What tests do I need?  Blood tests  · Lipid and cholesterol levels. These may be checked every 5 years, or more frequently depending on your overall health.  · Hepatitis C test.  · Hepatitis B test.  Screening  · Lung cancer screening. You may have this screening every year starting at age 55 if you have a 30-pack-year history of smoking and currently smoke or have quit within the past 15 years.  · Colorectal cancer screening. All adults should have this screening starting at age 50 and continuing until age 75. Your health care provider may recommend screening at age 45 if you are at increased risk. You will have tests every 1-10 years, depending on your results and the type of screening test.  · Prostate cancer screening. Recommendations will vary depending on your family history and other risks.  · Diabetes screening. This is done by checking your blood sugar (glucose) after you have not eaten for a while (fasting). You may have this done every 1-3 years.  · Abdominal aortic aneurysm (AAA) screening. You may need this if you are a current or former smoker.  · Sexually transmitted disease (STD) testing.  Follow these instructions at home:  Eating and drinking  · Eat a diet that includes fresh fruits and vegetables, whole grains, lean protein, and low-fat dairy products. Limit your intake of foods with high amounts of sugar, saturated fats, and salt.  · Take vitamin and mineral supplements as recommended by your health care provider.  · Do not drink alcohol if your health care  provider tells you not to drink.  · If you drink alcohol:  ? Limit how much you have to 0-2 drinks a day.  ? Be aware of how much alcohol is in your drink. In the [place] one drink equals one 12 oz bottle of beer (355 mL), one 5 oz glass of wine (148 mL), or one 1½ oz glass of hard liquor (44 mL).  Lifestyle  · Take daily care of your teeth and gums.  · Stay active. Exercise for at least 30 minutes on 5 or more days each week.  · Do not use any products that contain nicotine or tobacco, such as cigarettes, e-cigarettes, and chewing tobacco. If you need help quitting, ask your health care provider.  · If you are sexually active, practice safe sex. Use a condom or other form of protection to prevent STIs (sexually transmitted infections).  · Talk with your health care provider about taking a low-dose aspirin or statin.  What's next?  · Visit your health care provider once a year for a well check visit.  · Ask your health care provider how often you should have your eyes and teeth checked.  · Stay up to date on all vaccines.  This information is not intended to replace advice given to you by your health care provider. Make sure you discuss any questions you have with your health care provider.  Document Revised: 12/12/2019 Document Reviewed: 12/12/2019  [employer] Patient Education © 2020 [employer]    Health Maintenance After Age 65  After age 65, you are at a higher risk for certain long-term diseases and infections as well as injuries from falls. Falls are a major cause of broken bones and head injuries in people who are older than age 65. Getting regular preventive care can help to keep you healthy and well. Preventive care includes getting regular testing and making lifestyle changes as recommended by your health care provider. Talk with your health care provider about:  · Which screenings and tests you should have. A screening is a test that checks for a disease when you have no symptoms.  · A diet and exercise  plan that is right for you.  What should I know about screenings and tests to prevent falls?  Screening and testing are the best ways to find a health problem early. Early diagnosis and treatment give you the best chance of managing medical conditions that are common after age 65. Certain conditions and lifestyle choices may make you more likely to have a fall. Your health care provider may recommend:  · Regular vision checks. Poor vision and conditions such as cataracts can make you more likely to have a fall. If you wear glasses, make sure to get your prescription updated if your vision changes.  · Medicine review. Work with your health care provider to regularly review all of the medicines you are taking, including over-the-counter medicines. Ask your health care provider about any side effects that may make you more likely to have a fall. Tell your health care provider if any medicines that you take make you feel dizzy or sleepy.  · Osteoporosis screening. Osteoporosis is a condition that causes the bones to get weaker. This can make the bones weak and cause them to break more easily.  · Blood pressure screening. Blood pressure changes and medicines to control blood pressure can make you feel dizzy.  · Strength and balance checks. Your health care provider may recommend certain tests to check your strength and balance while standing, walking, or changing positions.  · Foot health exam. Foot pain and numbness, as well as not wearing proper footwear, can make you more likely to have a fall.  · Depression screening. You may be more likely to have a fall if you have a fear of falling, feel emotionally low, or feel unable to do activities that you used to do.  · Alcohol use screening. Using too much alcohol can affect your balance and may make you more likely to have a fall.  What actions can I take to lower my risk of falls?  General instructions  · Talk with your health care provider about your risks for falling.  Tell your health care provider if:  ? You fall. Be sure to tell your health care provider about all falls, even ones that seem minor.  ? You feel dizzy, sleepy, or off-balance.  · Take over-the-counter and prescription medicines only as told by your health care provider. These include any supplements.  · Eat a healthy diet and maintain a healthy weight. A healthy diet includes low-fat dairy products, low-fat (lean) meats, and fiber from whole grains, beans, and lots of fruits and vegetables.  Home safety  · Remove any tripping hazards, such as rugs, cords, and clutter.  · Install safety equipment such as grab bars in bathrooms and safety rails on stairs.  · Keep rooms and walkways well-lit.  Activity    · Follow a regular exercise program to stay fit. This will help you maintain your balance. Ask your health care provider what types of exercise are appropriate for you.  · If you need a cane or walker, use it as recommended by your health care provider.  · Wear supportive shoes that have nonskid soles.  Lifestyle  · Do not drink alcohol if your health care provider tells you not to drink.  · If you drink alcohol, limit how much you have:  ? 0-1 drink a day for women.  ? 0-2 drinks a day for men.  · Be aware of how much alcohol is in your drink. In the [place] one drink equals one typical bottle of beer (12 oz), one-half glass of wine (5 oz), or one shot of hard liquor (1½ oz).  · Do not use any products that contain nicotine or tobacco, such as cigarettes and e-cigarettes. If you need help quitting, ask your health care provider.  Summary  · Having a healthy lifestyle and getting preventive care can help to protect your health and wellness after age 65.  · Screening and testing are the best way to find a health problem early and help you avoid having a fall. Early diagnosis and treatment give you the best chance for managing medical conditions that are more common for people who are older than age 65.  · Falls are a  major cause of broken bones and head injuries in people who are older than age 65. Take precautions to prevent a fall at home.  · Work with your health care provider to learn what changes you can make to improve your health and wellness and to prevent falls.  This information is not intended to replace advice given to you by your health care provider. Make sure you discuss any questions you have with your health care provider.  Document Revised: 04/09/2020 Document Reviewed: 10/31/2018  [employer] Patient Education © 2021 [employer]

## 2021-04-07 LAB
HBA1C MFR BLD: 7.2 % (ref 4.8–5.6)
HCV AB S/CO SERPL IA: <0.1 S/CO RATIO (ref 0–0.9)
MICROALBUMIN UR-MCNC: 63.5 UG/ML

## 2021-04-14 ENCOUNTER — OFFICE VISIT (OUTPATIENT)
Dept: FAMILY MEDICINE CLINIC | Facility: CLINIC | Age: 71
End: 2021-04-14

## 2021-04-14 VITALS
SYSTOLIC BLOOD PRESSURE: 150 MMHG | BODY MASS INDEX: 30.34 KG/M2 | TEMPERATURE: 97.1 F | RESPIRATION RATE: 18 BRPM | DIASTOLIC BLOOD PRESSURE: 78 MMHG | HEART RATE: 89 BPM | WEIGHT: 244 LBS | OXYGEN SATURATION: 99 % | HEIGHT: 75 IN

## 2021-04-14 DIAGNOSIS — B35.4 RINGWORM, BODY: Primary | ICD-10-CM

## 2021-04-14 DIAGNOSIS — I10 ESSENTIAL HYPERTENSION: ICD-10-CM

## 2021-04-14 DIAGNOSIS — L30.9 ECZEMA, UNSPECIFIED TYPE: ICD-10-CM

## 2021-04-14 PROCEDURE — 99213 OFFICE O/P EST LOW 20 MIN: CPT | Performed by: FAMILY MEDICINE

## 2021-04-14 RX ORDER — HYDROXYZINE HYDROCHLORIDE 25 MG/1
25 TABLET, FILM COATED ORAL 3 TIMES DAILY PRN
Qty: 42 TABLET | Refills: 0 | Status: SHIPPED | OUTPATIENT
Start: 2021-04-14 | End: 2021-04-28

## 2021-04-14 RX ORDER — CLOTRIMAZOLE AND BETAMETHASONE DIPROPIONATE 10; .64 MG/G; MG/G
CREAM TOPICAL 2 TIMES DAILY
Qty: 45 G | Refills: 0 | Status: SHIPPED | OUTPATIENT
Start: 2021-04-14 | End: 2021-04-28

## 2021-04-14 NOTE — PATIENT INSTRUCTIONS
Eczema  Eczema is a broad term for a group of skin conditions that cause skin to become rough and inflamed. Each type of eczema has different triggers, symptoms, and treatments. Eczema of any type is usually itchy and symptoms range from mild to severe.  Eczema and its symptoms are not spread from person to person (are not contagious). It can appear on different parts of the body at different times. Your eczema may not look the same as someone else's eczema.  What are the types of eczema?  Atopic dermatitis  This is a long-term (chronic) skin disease that keeps coming back (recurring). Usual symptoms are dry skin and small, solid pimples that may swell and leak fluid (weep).  Contact dermatitis    This happens when something irritates the skin and causes a rash. The irritation can come from substances that you are allergic to (allergens), such as poison ivy, chemicals, or medicines that were applied to your skin.  Dyshidrotic eczema  This is a form of eczema on the hands and feet. It shows up as very itchy, fluid-filled blisters. It can affect people of any age, but is more common before age 40.  Hand eczema    This causes very itchy areas of skin on the palms and sides of the hands and fingers. This type of eczema is common in industrial jobs where you may be exposed to many different types of irritants.  Lichen simplex chronicus  This type of eczema occurs when a person constantly scratches one area of the body. Repeated scratching of the area leads to thickened skin (lichenification). Lichen simplex chronicus can occur along with other types of eczema. It is more common in adults, but may be seen in children as well.  Nummular eczema  This is a common type of eczema. It has no known cause. It typically causes a red, circular, crusty lesion (plaque) that may be itchy. Scratching may become a habit and can cause bleeding. Nummular eczema occurs most often in people of middle-age or older. It most often affects the  "hands.  Seborrheic dermatitis  This is a common skin disease that mainly affects the scalp. It may also affect any oily areas of the body, such as the face, sides of nose, eyebrows, ears, eyelids, and chest. It is marked by small scaling and redness of the skin (erythema). This can affect people of all ages. In infants, this condition is known as \"cradle cap.\"  Stasis dermatitis  This is a common skin disease that usually appears on the legs and feet. It most often occurs in people who have a condition that prevents blood from being pumped through the veins in the legs (chronic venous insufficiency). Stasis dermatitis is a chronic condition that needs long-term management.  How is eczema diagnosed?  Your health care provider will examine your skin and review your medical history. He or she may also give you skin patch tests. These tests involve taking patches that contain possible allergens and placing them on your back. He or she will then check in a few days to see if an allergic reaction occurred.  What are the common treatments?  Treatment for eczema is based on the type of eczema you have. Hydrocortisone steroid medicine can relieve itching quickly and help reduce inflammation. This medicine may be prescribed or obtained over-the-counter, depending on the strength of the medicine that is needed.  Follow these instructions at home:  · Take over-the-counter and prescription medicines only as told by your health care provider.  · Use creams or ointments to moisturize your skin. Do not use lotions.  · Learn what triggers or irritates your symptoms. Avoid these things.  · Treat symptom flare-ups quickly.  · Do not itch your skin. This can make your rash worse.  · Keep all follow-up visits as told by your health care provider. This is important.  Where to find more information  · The American Academy of Dermatology: www.aad.org  · The National Eczema Association: www.nationaleczema.org  Contact a health care provider " if:  · You have serious itching, even with treatment.  · You regularly scratch your skin until it bleeds.  · Your rash looks different than usual.  · Your skin is painful, swollen, or more red than usual.  · You have a fever.  Summary  · There are eight general types of eczema. Each type has different triggers.  · Eczema of any type causes itching that may range from mild to severe.  · Treatment varies based on the type of eczema you have. Hydrocortisone steroid medicine can help with itching and inflammation.  · Protecting your skin is the best way to prevent eczema. Use moisturizers and lotions. Avoid triggers and irritants, and treat flare-ups quickly.  This information is not intended to replace advice given to you by your health care provider. Make sure you discuss any questions you have with your health care provider.  Document Revised: 11/30/2018 Document Reviewed: 05/03/2018  [employer] Patient Education © 2021 [employer]    Body Ringworm  Body ringworm is an infection of the skin that often causes a ring-shaped rash. Body ringworm is also called tinea corporis.  Body ringworm can affect any part of your skin. This condition is easily spread from person to person (is very contagious).  What are the causes?  This condition is caused by fungi called dermatophytes. The condition develops when these fungi grow out of control on the skin.  You can get this condition if you touch a person or animal that has it. You can also get it if you share any items with an infected person or pet. These include:  · Clothing, bedding, and towels.  · Brushes or brown.  · Gym equipment.  · Any other object that has the fungus on it.  What increases the risk?  You are more likely to develop this condition if you:  · Play sports that involve close physical contact, such as wrestling.  · Sweat a lot.  · Live in areas that are hot and humid.  · Use public showers.  · Have a weakened immune system.  What are the signs or  symptoms?  Symptoms of this condition include:  · Itchy, raised red spots and bumps.  · Red scaly patches.  · A ring-shaped rash. The rash may have:  ? A clear center.  ? Scales or red bumps at its center.  ? Redness near its borders.  ? Dry and scaly skin on or around it.  How is this diagnosed?  This condition can usually be diagnosed with a skin exam. A skin scraping may be taken from the affected area and examined under a microscope to see if the fungus is present.  How is this treated?  This condition may be treated with:  · An antifungal cream or ointment.  · An antifungal shampoo.  · Antifungal medicines. These may be prescribed if your ringworm:  ? Is severe.  ? Keeps coming back.  ? Lasts a long time.  Follow these instructions at home:  · Take over-the-counter and prescription medicines only as told by your health care provider.  · If you were given an antifungal cream or ointment:  ? Use it as told by your health care provider.  ? Wash the infected area and dry it completely before applying the cream or ointment.  · If you were given an antifungal shampoo:  ? Use it as told by your health care provider.  ? Leave the shampoo on your body for 3-5 minutes before rinsing.  · While you have a rash:  ? Wear loose clothing to stop clothes from rubbing and irritating it.  ? Wash or change your bed sheets every night.  ? Disinfect or throw out items that may be infected.  ? Wash clothes and bed sheets in hot water.  ? Wash your hands often with soap and water. If soap and water are not available, use hand .  · If your pet has the same infection, take your pet to see a  for treatment.  How is this prevented?  · Take a bath or shower every day and after every time you work out or play sports.  · Dry your skin completely after bathing.  · Wear sandals or shoes in public places and showers.  · Change your clothes every day.  · Wash athletic clothes after each use.  · Do not share personal items  with others.  · Avoid touching red patches of skin on other people.  · Avoid touching pets that have bald spots.  · If you touch an animal that has a bald spot, wash your hands.  Contact a health care provider if:  · Your rash continues to spread after 7 days of treatment.  · Your rash is not gone in 4 weeks.  · The area around your rash gets red, warm, tender, and swollen.  Summary  · Body ringworm is an infection of the skin that often causes a ring-shaped rash.  · This condition is easily spread from person to person (is very contagious).  · This condition may be treated with antifungal cream or ointment, antifungal shampoo, or antifungal medicines.  · Take over-the-counter and prescription medicines only as told by your health care provider.  This information is not intended to replace advice given to you by your health care provider. Make sure you discuss any questions you have with your health care provider.  Document Revised: 08/16/2019 Document Reviewed: 08/16/2019  [employer] Patient Education © 2021 [employer]

## 2021-04-14 NOTE — PROGRESS NOTES
"Chief Complaint  Follow-up (for blood work review.) and Rash    Subjective          Panda Henry presents to [facility]  The patient is here for follow up, he stated that his back pain is better but he was in the ER this past Monday because he past out at home. Patient stated that the cardiologist added a BP medicine last week and the ER Doctor thinks that the medicine got him dehydrated and his blood pressure went down too low.  He was advised to stop the new blood pressure medicine by the ER doctor and he has not taken it since then.  Patient also here now because of an itchy rash on the chest, right lower leg and right upper arm. Patient already have appointment with psychiatrist, and we are still waiting for his medical records from his PCP regarding the reason why he is on gabapentin prescriptions.      Objective   Vital Signs:   /78 (BP Location: Left arm, Patient Position: Sitting, Cuff Size: Adult) Comment: patient states had a headache all night.  Pulse 89   Temp 97.1 °F (36.2 °C)   Resp 18   Ht 190.5 cm (75\")   Wt 111 kg (244 lb)   SpO2 99%   BMI 30.50 kg/m²     Physical Exam  Vitals and nursing note reviewed.   Constitutional:       General: He is not in acute distress.     Appearance: Normal appearance. He is well-developed and well-groomed. He is obese.   HENT:      Head: Normocephalic and atraumatic.      Jaw: No tenderness or pain on movement.      Salivary Glands: Right salivary gland is not diffusely enlarged. Left salivary gland is not diffusely enlarged.      Right Ear: Tympanic membrane and ear canal normal.      Left Ear: Tympanic membrane and ear canal normal.      Nose: No congestion or rhinorrhea.      Mouth/Throat:      Mouth: Mucous membranes are moist.      Pharynx: No oropharyngeal exudate or posterior oropharyngeal erythema.   Eyes:      General: Lids are normal. No allergic shiner or scleral icterus.     Conjunctiva/sclera: Conjunctivae " normal.      Pupils: Pupils are equal, round, and reactive to light.   Neck:      Thyroid: No thyroid mass, thyromegaly or thyroid tenderness.      Trachea: Trachea normal.   Cardiovascular:      Rate and Rhythm: Normal rate and regular rhythm.      Pulses: Normal pulses.      Heart sounds: Normal heart sounds.   Pulmonary:      Effort: Pulmonary effort is normal. No respiratory distress.      Breath sounds: Normal breath sounds. No wheezing or rales.   Chest:      Breasts:         Right: Normal.         Left: Normal.   Abdominal:      General: Abdomen is protuberant.      Palpations: Abdomen is soft.   Musculoskeletal:      Cervical back: Normal and neck supple.      Thoracic back: Normal.      Lumbar back: Normal.   Skin:     General: Skin is warm.      Findings: Rash present. Rash is macular ( right chest wall and right lower leg with excoriations) and scaling ( circular, mild erythema, right upper arm).      Nails: There is no clubbing.   Neurological:      General: No focal deficit present.      Mental Status: He is alert and oriented to person, place, and time.   Psychiatric:         Mood and Affect: Mood normal.         Behavior: Behavior normal.        Result Review :                 Assessment and Plan    Diagnoses and all orders for this visit:    1. Ringworm, body (Primary)  -     clotrimazole-betamethasone (Lotrisone) 1-0.05 % cream; Apply  topically to the appropriate area as directed 2 (Two) Times a Day for 14 days.  Dispense: 45 g; Refill: 0    2. Eczema, unspecified type  -     hydrOXYzine (ATARAX) 25 MG tablet; Take 1 tablet by mouth 3 (Three) Times a Day As Needed for Itching for up to 14 days.  Dispense: 42 tablet; Refill: 0        Follow Up   Return in about 1 month (around 5/14/2021), or if symptoms worsen or fail to improve.  For follow-up.  Patient was given instructions and counseling regarding his condition or for health maintenance advice. Please see specific information pulled into the AVS  if appropriate.     The patient was given a prescription for Lotrisone cream apply to affected area twice a day also given a prescription for hydroxyzine 25 mg p.o. 3 times daily as needed for itching.  Again the patient was advised to get his medical records from his previous PCP to review why he is taking gabapentin.  He also have an appointment with a psychiatrist.  At this time he does not need refills on his other regular medications.

## 2021-04-15 ENCOUNTER — TELEPHONE (OUTPATIENT)
Dept: FAMILY MEDICINE CLINIC | Facility: CLINIC | Age: 71
End: 2021-04-15

## 2021-04-15 NOTE — TELEPHONE ENCOUNTER
Caller: ZELDA EVERETT    Relationship: Emergency Contact    Best call back number: [phone]     What is the best time to reach you: AFTERNOON    Who are you requesting to speak with (clinical staff, provider,  specific staff member): CLINICAL STAFF    What was the call regarding: PATIENT'S WIFE RECEIVED VOICEMAIL TO CALL OFFICE ABOUT PATIENT'S COLONOSCOPY APPOINTMENT INFORMATION.    Do you require a callback: YES

## 2021-05-07 NOTE — THERAPY TREATMENT NOTE
Acute Care - Occupational Therapy Initial Evaluation  [facility]     Patient Name: Panda Henry  : 1950  MRN: [id number]  Today's Date: 8/3/2020             Admit Date: 2020       ICD-10-CM ICD-9-CM   1. Generalized weakness R53.1 780.79   2. Chest pain in adult R07.9 786.50   3. UREÑA (dyspnea on exertion) R06.00 786.09   4. History of coronary artery disease Z86.79 V12.59   5. History of hypertension Z86.79 V12.59   6. Diabetes mellitus type 2 in obese (CMS/HCC) E11.69 250.00    E66.9 278.00     Patient Active Problem List   Diagnosis   • Generalized weakness   • Chest pain   • Hypertension   • Coronary artery disease   • Diabetes mellitus (CMS/HCC)   • Chronic back pain   • Hypokalemia     Past Medical History:   Diagnosis Date   • Cancer (CMS/HCC)     basal cell carcinoma   • COPD (chronic obstructive pulmonary disease) (CMS/HCC)    • Coronary artery disease    • Depression    • Diabetes mellitus (CMS/HCC)    • GERD (gastroesophageal reflux disease)    • Hyperlipidemia    • Hypertension    • Injury of back      Past Surgical History:   Procedure Laterality Date   • BLEPHAROPLASTY Bilateral    • CARDIAC CATHETERIZATION     • SHOULDER ROTATOR CUFF REPAIR Right    • SINUPLASTY     • SKIN BIOPSY            OT ASSESSMENT FLOWSHEET (last 12 hours)      Occupational Therapy Evaluation     Row Name 20 0735                   OT Evaluation Time/Intention    Subjective Information  no complaints;pain in chest  -KATE        Document Type  evaluation  -KATE        Mode of Treatment  occupational therapy  -KATE        Patient Effort  good  -KATE        Symptoms Noted During/After Treatment  none  -KATE           General Information    Patient Profile Reviewed?  yes  -KATE        Prior Level of Function  independent:;dressing;grooming;all household mobility;min assist:;bathing  -KATE        Equipment Currently Used at Home  cane, straight;glucometer;bipap/ cpap  -KATE        Pertinent History of Current Functional Problem   CAD, DM  -KATE        Existing Precautions/Restrictions  fall  -KATE        Barriers to Rehab  none identified  -KATE           Relationship/Environment    Lives With  spouse;grandchild(kel) adult grandson with autism  -KATE        Primary Roles/Responsibilities  retired heavy   -KATE           Resource/Environmental Concerns    Current Living Arrangements  home/apartment/condo  -KATE        Resource/Environmental Concerns  none  -KATE           Home Main Entrance    Number of Stairs, Main Entrance  one  -KATE           Cognitive Assessment/Intervention- PT/OT    Orientation Status (Cognition)  oriented x 4  -KATE        Follows Commands (Cognition)  WFL  -KATE        Safety Deficit (Cognitive)  insight into deficits/self awareness  -KATE           Bed Mobility Assessment/Treatment    Bed Mobility Assessment/Treatment  scooting/bridging;supine-sit  -KATE        Scooting/Bridging [place] (Bed Mobility)  conditional independence  -KATE        Supine-Sit [place] (Bed Mobility)  conditional independence  -KATE        Assistive Device (Bed Mobility)  bed rails;head of bed elevated  -KATE           Functional Mobility    Functional Mobility- Ind. Level  standby assist  -KATE        Functional Mobility-Distance (Feet)  20  -KATE        Functional Mobility- Comment  pt ambulated to/from bathroom from bedside  -KATE           Transfer Assessment/Treatment    Transfer Assessment/Treatment  sit-stand transfer;stand-sit transfer;toilet transfer  -KATE           Sit-Stand Transfer    Sit-Stand [place] (Transfers)  supervision  -KATE           Stand-Sit Transfer    Stand-Sit [place] (Transfers)  supervision  -KATE           Toilet Transfer    Type (Toilet Transfer)  stand-sit;sit-stand;stand pivot/stand step  -KATE        [place] Level (Toilet Transfer)  stand by assist  -KATE        Assistive Device (Toilet Transfer)  grab bars/safety frame  -KATE           ADL Assessment/Intervention    BADL Assessment/Intervention  lower body  dressing;toileting;grooming  -KATE           Lower Body Dressing Assessment/Training    Lower Body Dressing [place] Level  independent;don;socks  -KATE        Lower Body Dressing Position  edge of bed sitting  -KATE           Grooming Assessment/Training    [place] Level (Grooming)  supervision;wash face, hands;shave face;hair care, combing/brushing  -KATE        Grooming Position  unsupported standing  -KATE           Toileting Assessment/Training    [place] Level (Toileting)  supervision;adjust/manage clothing  -KATE        Assistive Devices (Toileting)  grab bar/safety frame  -KATE        Toileting Position  unsupported sitting  -KATE        Comment (Toileting)  sitting to void  -KATE           BADL Safety/Performance    Impairments, BADL Safety/Performance  endurance/activity tolerance;pain;shortness of breath;strength  -KATE           General ROM    GENERAL ROM COMMENTS  BUE AROM WFL  -KATE           MMT (Manual Muscle Testing)    General MMT Comments  BUE's grossly 4-/5  -KATE           Sensory Assessment/Intervention    Sensory General Assessment  no sensation deficits identified  -KATE           Positioning and Restraints    Pre-Treatment Position  in bed  -KATE        Post Treatment Position  bed  -KATE        In Bed  fowlers;call light within reach;encouraged to call for assist;exit alarm on  -KATE           Pain Assessment    Additional Documentation  Pain Scale: Numbers Pre/Post-Treatment (Group)  -KATE           Pain Scale: Numbers Pre/Post-Treatment    Pain Scale: Numbers, Pretreatment  2/10  -KATE        Pain Scale: Numbers, Post-Treatment  6/10  -KATE        Pain Location - Side  Bilateral  -KATE        Pain Location - Orientation  lower  -KATE        Pain Location  back  -KATE        Pre/Post Treatment Pain Comment  pt reports chronic back pain  -KATE        Pain Intervention(s)  Ambulation/increased activity;Repositioned;Rest  -KATE           Coping    Observed Emotional State  calm;cooperative  -KATE           Plan of Care Review     Plan of Care Reviewed With  patient  -KATE           Clinical Impression (OT)    OT Diagnosis  ADL decline  -KATE        Patient/Family Goals Statement (OT Eval)  to increase strength and endurance for ADL's  -KATE        Criteria for Skilled Therapeutic Interventions Met (OT Eval)  yes;treatment indicated  -KATE        Rehab Potential (OT Eval)  good, to achieve stated therapy goals  -KATE        Therapy Frequency (OT Eval)  daily  -KATE        Care Plan Review (OT)  evaluation/treatment results reviewed  -KATE        Anticipated Equipment Needs at Discharge (OT)  tub bench  -KATE        Anticipated Discharge Disposition (OT)  home with home health  -KATE           Vital Signs    Pre Systolic BP Rehab  151  -KATE        Pre Treatment Diastolic BP  79  -KATE        Pretreatment Heart Rate (beats/min)  55  -KATE        Intratreatment Heart Rate (beats/min)  84  -KATE        Posttreatment Heart Rate (beats/min)  65  -KATE        Pre SpO2 (%)  97  -KATE        O2 Delivery Pre Treatment  room air  -KATE        O2 Delivery Intra Treatment  room air  -KATE        Post SpO2 (%)  98  -KATE        O2 Delivery Post Treatment  room air  -KATE        Pre Patient Position  Supine  -KATE        Intra Patient Position  Standing  -KATE        Post Patient Position  Supine  -KATE           Planned OT Interventions    Planned Therapy Interventions (OT Eval)  activity tolerance training;BADL retraining;functional balance retraining;ROM/therapeutic exercise;strengthening exercise;transfer/mobility retraining  -KATE           OT Goals    Transfer Goal Selection (OT)  transfer, OT goal 1  -KATE        Toileting Goal Selection (OT)  toileting, OT goal 1  -KATE           Transfer Goal 1 (OT)    Activity/Assistive Device (Transfer Goal 1, OT)  sit-to-stand/stand-to-sit;bed-to-chair/chair-to-bed;toilet  -KATE        [place] Level/Cues Needed (Transfer Goal 1, OT)  conditional independence  -KATE        Time Frame (Transfer Goal 1, OT)  10 days  -KATE        Progress/Outcome (Transfer Goal  1, OT)  goal ongoing  -KATE           Toileting Goal 1 (OT)    Activity/Device (Toileting Goal 1, OT)  adjust/manage clothing;perform perineal hygiene;grab bar/safety frame  -KATE        [place] Level/Cues Needed (Toileting Goal 1, OT)  conditional independence  -KATE        Time Frame (Toileting Goal 1, OT)  10 days  -KATE        Progress/Outcome (Toileting Goal 1, OT)  goal ongoing  -KATE           Living Environment    Home Accessibility  stairs to enter home;tub/shower is not walk in  -KATE          User Key  (r) = Recorded By, (t) = Taken By, (c) = Cosigned By    Initials Name Effective Dates    Symone High OT 02/14/20 -          Occupational Therapy Education                 Title: PT OT SLP Therapies (In Progress)     Topic: Occupational Therapy (Done)     Point: ADL training (Done)     Description:   Instruct learner(s) on proper safety adaptation and remediation techniques during self care or transfers.   Instruct in proper use of assistive devices.              Learning Progress Summary           Patient Acceptance, E, VU by KATE at 8/3/2020 0740    Comment:  Role of OT                   Point: Home exercise program (Done)     Description:   Instruct learner(s) on appropriate technique for monitoring, assisting and/or progressing therapeutic exercises/activities.              Learning Progress Summary           Patient Acceptance, E, VU by KATE at 8/3/2020 0740    Comment:  Role of OT                   Point: Precautions (Done)     Description:   Instruct learner(s) on prescribed precautions during self-care and functional transfers.              Learning Progress Summary           Patient Acceptance, E, VU by KATE at 8/3/2020 0740    Comment:  Role of OT                   Point: Body mechanics (Done)     Description:   Instruct learner(s) on proper positioning and spine alignment during self-care, functional mobility activities and/or exercises.              Learning Progress Summary           Patient  Acceptance, E, VU by KATE at 8/3/2020 0740    Comment:  Role of OT                               User Key     Initials Effective Dates Name Provider Type Discipline     02/14/20 -  Symone Giraldo, OT Occupational Therapist OT                  OT Recommendation and Plan  Outcome Summary/Treatment Plan (OT)  Anticipated Equipment Needs at Discharge (OT): tub bench  Anticipated Discharge Disposition (OT): home with home health  Planned Therapy Interventions (OT Eval): activity tolerance training, BADL retraining, functional balance retraining, ROM/therapeutic exercise, strengthening exercise, transfer/mobility retraining  Therapy Frequency (OT Eval): daily  Plan of Care Review  Plan of Care Reviewed With: patient  Plan of Care Reviewed With: patient  Outcome Summary: OT evaluation completed. Pt presents with decreased activity tolerance and generalized weakness limiting ADL independence. Pt completed supine to sit EOB with conditional independence, ambulated to/from bathroom with SUP to complete grooming standing sinkside with SUP. Pt completed toilet tx and toileting tasks with SBA, no c/o chest pain or dizziness during activity. Pt to benefit from skilled OT services to progress pt's self-care. Recommend  OT at discharge..    Outcome Measures     Row Name 08/03/20 0735             How much help from another is currently needed...    Putting on and taking off regular lower body clothing?  4  -KATE      Bathing (including washing, rinsing, and drying)  3  -KATE      Toileting (which includes using toilet bed pan or urinal)  3  -KATE      Putting on and taking off regular upper body clothing  4  -KATE      Taking care of personal grooming (such as brushing teeth)  4  -KATE      Eating meals  4  -KATE      AM-PAC 6 Clicks Score (OT)  22  -KATE         Functional Assessment    Outcome Measure Options  AM-PAC 6 Clicks Daily Activity (OT)  -KATE        User Key  (r) = Recorded By, (t) = Taken By, (c) = Cosigned By    Initials Name  Provider Type    Symone High OT Occupational Therapist          Time Calculation:   Time Calculation- OT     Row Name 08/03/20 0735             Time Calculation- OT    OT Start Time  0735  -KATE      OT Received On  08/03/20  -KATE      OT Goal Re-Cert Due Date  08/13/20  -KATE        User Key  (r) = Recorded By, (t) = Taken By, (c) = Cosigned By    Initials Name Provider Type    Symone High OT Occupational Therapist        Therapy Charges for Today     Code Description Service Date Service Provider Modifiers Qty    [id number] HC OT EVAL LOW COMPLEXITY 4 8/3/2020 Symone Giraldo OT GO 1               Symone Giraldo OT  8/3/2020   quit 20 yrs ago

## 2021-05-11 ENCOUNTER — OFFICE VISIT (OUTPATIENT)
Dept: FAMILY MEDICINE CLINIC | Facility: CLINIC | Age: 71
End: 2021-05-11

## 2021-05-11 VITALS
DIASTOLIC BLOOD PRESSURE: 70 MMHG | HEIGHT: 75 IN | SYSTOLIC BLOOD PRESSURE: 160 MMHG | OXYGEN SATURATION: 98 % | WEIGHT: 239 LBS | TEMPERATURE: 97.9 F | RESPIRATION RATE: 20 BRPM | BODY MASS INDEX: 29.72 KG/M2 | HEART RATE: 92 BPM

## 2021-05-11 DIAGNOSIS — Z79.4 TYPE 2 DIABETES MELLITUS WITH DIABETIC NEUROPATHY, WITH LONG-TERM CURRENT USE OF INSULIN (HCC): ICD-10-CM

## 2021-05-11 DIAGNOSIS — G89.29 CHRONIC MIDLINE LOW BACK PAIN WITHOUT SCIATICA: ICD-10-CM

## 2021-05-11 DIAGNOSIS — I10 ESSENTIAL HYPERTENSION: Primary | ICD-10-CM

## 2021-05-11 DIAGNOSIS — Z23 ENCOUNTER FOR IMMUNIZATION: ICD-10-CM

## 2021-05-11 DIAGNOSIS — M54.2 CHRONIC NECK PAIN: ICD-10-CM

## 2021-05-11 DIAGNOSIS — E11.40 TYPE 2 DIABETES MELLITUS WITH DIABETIC NEUROPATHY, WITH LONG-TERM CURRENT USE OF INSULIN (HCC): ICD-10-CM

## 2021-05-11 DIAGNOSIS — F41.9 ANXIETY: ICD-10-CM

## 2021-05-11 DIAGNOSIS — M54.50 CHRONIC MIDLINE LOW BACK PAIN WITHOUT SCIATICA: ICD-10-CM

## 2021-05-11 DIAGNOSIS — G89.29 CHRONIC NECK PAIN: ICD-10-CM

## 2021-05-11 PROCEDURE — 90715 TDAP VACCINE 7 YRS/> IM: CPT | Performed by: FAMILY MEDICINE

## 2021-05-11 PROCEDURE — 99213 OFFICE O/P EST LOW 20 MIN: CPT | Performed by: FAMILY MEDICINE

## 2021-05-11 PROCEDURE — 90471 IMMUNIZATION ADMIN: CPT | Performed by: FAMILY MEDICINE

## 2021-05-11 RX ORDER — CELECOXIB 200 MG/1
CAPSULE ORAL
COMMUNITY
Start: 2021-04-21 | End: 2021-05-11

## 2021-05-11 RX ORDER — METOPROLOL SUCCINATE 25 MG/1
25 TABLET, EXTENDED RELEASE ORAL DAILY
Qty: 30 TABLET | Refills: 0 | Status: SHIPPED | OUTPATIENT
Start: 2021-05-11 | End: 2021-06-14 | Stop reason: DRUGHIGH

## 2021-05-11 NOTE — PATIENT INSTRUCTIONS
Hypertension, Adult  Hypertension is another name for high blood pressure. High blood pressure forces your heart to work harder to pump blood. This can cause problems over time.  There are two numbers in a blood pressure reading. There is a top number (systolic) over a bottom number (diastolic). It is best to have a blood pressure that is below 120/80. Healthy choices can help lower your blood pressure, or you may need medicine to help lower it.  What are the causes?  The cause of this condition is not known. Some conditions may be related to high blood pressure.  What increases the risk?  · Smoking.  · Having type 2 diabetes mellitus, high cholesterol, or both.  · Not getting enough exercise or physical activity.  · Being overweight.  · Having too much fat, sugar, calories, or salt (sodium) in your diet.  · Drinking too much alcohol.  · Having long-term (chronic) kidney disease.  · Having a family history of high blood pressure.  · Age. Risk increases with age.  · Race. You may be at higher risk if you are .  · Gender. Men are at higher risk than women before age 45. After age 65, women are at higher risk than men.  · Having obstructive sleep apnea.  · Stress.  What are the signs or symptoms?  · High blood pressure may not cause symptoms. Very high blood pressure (hypertensive crisis) may cause:  ? Headache.  ? Feelings of worry or nervousness (anxiety).  ? Shortness of breath.  ? Nosebleed.  ? A feeling of being sick to your stomach (nausea).  ? Throwing up (vomiting).  ? Changes in how you see.  ? Very bad chest pain.  ? Seizures.  How is this treated?  · This condition is treated by making healthy lifestyle changes, such as:  ? Eating healthy foods.  ? Exercising more.  ? Drinking less alcohol.  · Your health care provider may prescribe medicine if lifestyle changes are not enough to get your blood pressure under control, and if:  ? Your top number is above 130.  ? Your bottom number is above  80.  · Your personal target blood pressure may vary.  Follow these instructions at home:  Eating and drinking    · If told, follow the DASH eating plan. To follow this plan:  ? Fill one half of your plate at each meal with fruits and vegetables.  ? Fill one fourth of your plate at each meal with whole grains. Whole grains include whole-wheat pasta, brown rice, and whole-grain bread.  ? Eat or drink low-fat dairy products, such as skim milk or low-fat yogurt.  ? Fill one fourth of your plate at each meal with low-fat (lean) proteins. Low-fat proteins include fish, chicken without skin, eggs, beans, and tofu.  ? Avoid fatty meat, cured and processed meat, or chicken with skin.  ? Avoid pre-made or processed food.  · Eat less than 1,500 mg of salt each day.  · Do not drink alcohol if:  ? Your doctor tells you not to drink.  ? You are pregnant, may be pregnant, or are planning to become pregnant.  · If you drink alcohol:  ? Limit how much you use to:  § 0-1 drink a day for women.  § 0-2 drinks a day for men.  ? Be aware of how much alcohol is in your drink. In the [place] one drink equals one 12 oz bottle of beer (355 mL), one 5 oz glass of wine (148 mL), or one 1½ oz glass of hard liquor (44 mL).  Lifestyle    · Work with your doctor to stay at a healthy weight or to lose weight. Ask your doctor what the best weight is for you.  · Get at least 30 minutes of exercise most days of the week. This may include walking, swimming, or biking.  · Get at least 30 minutes of exercise that strengthens your muscles (resistance exercise) at least 3 days a week. This may include lifting weights or doing Pilates.  · Do not use any products that contain nicotine or tobacco, such as cigarettes, e-cigarettes, and chewing tobacco. If you need help quitting, ask your doctor.  · Check your blood pressure at home as told by your doctor.  · Keep all follow-up visits as told by your doctor. This is important.  Medicines  · Take over-the-counter  and prescription medicines only as told by your doctor. Follow directions carefully.  · Do not skip doses of blood pressure medicine. The medicine does not work as well if you skip doses. Skipping doses also puts you at risk for problems.  · Ask your doctor about side effects or reactions to medicines that you should watch for.  Contact a doctor if you:  · Think you are having a reaction to the medicine you are taking.  · Have headaches that keep coming back (recurring).  · Feel dizzy.  · Have swelling in your ankles.  · Have trouble with your vision.  Get help right away if you:  · Get a very bad headache.  · Start to feel mixed up (confused).  · Feel weak or numb.  · Feel faint.  · Have very bad pain in your:  ? Chest.  ? Belly (abdomen).  · Throw up more than once.  · Have trouble breathing.  Summary  · Hypertension is another name for high blood pressure.  · High blood pressure forces your heart to work harder to pump blood.  · For most people, a normal blood pressure is less than 120/80.  · Making healthy choices can help lower blood pressure. If your blood pressure does not get lower with healthy choices, you may need to take medicine.  This information is not intended to replace advice given to you by your health care provider. Make sure you discuss any questions you have with your health care provider.  Document Revised: 08/28/2019 Document Reviewed: 08/28/2019  [employer] Patient Education © 2021 [employer]

## 2021-05-11 NOTE — PROGRESS NOTES
"Chief Complaint  Hypertension (follow-up), Fatigue, Headache (today), and Sinusitis (worse x one month)    Subjective          Panda Henry presents to [facility]  The patient is here for follow-up, he has a history of hypertension, anxiety, diabetes, chronic neck and back pain.  The patient is apparently getting his [facility], [facility], his PCP from [place] and he is also getting prescriptions from [facility].  He is not exactly sure which medications come from the [facility], his family doctor and [facility].  He knows though that the psychiatry medicines comes from [facility] and the rest is either from [facility] and his previous family doctor.  The patient also states that his blood pressure is staying up when he is able to check it at home, he states that the first number always stays on the 160-170 and the lower number is always around 70.  He also states that the rash that we treated him last time is much better.  The patient states that the diclofenac we gave him last time helped his neck and his shoulder pain compared to the Celebrex that he was taking in the past.  The patient apparently missed his appointment to see the psychiatrist.      Objective   Vital Signs:   /70   Pulse 92   Temp 97.9 °F (36.6 °C) (Temporal)   Resp 20   Ht 190.5 cm (75\")   Wt 108 kg (239 lb)   SpO2 98%   BMI 29.87 kg/m²     Physical Exam  Vitals and nursing note reviewed.   Constitutional:       General: He is not in acute distress.     Appearance: Normal appearance. He is well-developed and well-groomed.   HENT:      Head: Normocephalic and atraumatic.      Jaw: No tenderness or pain on movement.      Salivary Glands: Right salivary gland is not diffusely enlarged. Left salivary gland is not diffusely enlarged.      Right Ear: Tympanic membrane and ear canal normal.      Left Ear: Tympanic membrane and ear canal normal.      Nose: No congestion or " rhinorrhea.      Mouth/Throat:      Mouth: Mucous membranes are moist.      Pharynx: No oropharyngeal exudate or posterior oropharyngeal erythema.   Eyes:      General: Lids are normal. No allergic shiner or scleral icterus.     Conjunctiva/sclera: Conjunctivae normal.      Pupils: Pupils are equal, round, and reactive to light.   Neck:      Thyroid: No thyroid mass, thyromegaly or thyroid tenderness.      Trachea: Trachea normal.   Cardiovascular:      Rate and Rhythm: Normal rate and regular rhythm.      Pulses: Normal pulses.      Heart sounds: Normal heart sounds.   Pulmonary:      Effort: Pulmonary effort is normal. No respiratory distress.      Breath sounds: Normal breath sounds. No wheezing or rales.   Chest:      Breasts:         Right: Normal.         Left: Normal.   Abdominal:      General: Abdomen is protuberant.      Palpations: Abdomen is soft.   Musculoskeletal:      Cervical back: Neck supple. Tenderness ( Bilateral trapezius muscle area) and bony tenderness present.      Thoracic back: Tenderness ( Bilateral thoracic paravertebral area) and bony tenderness present.      Lumbar back: Tenderness ( Bilateral lumbar paravertebral area) and bony tenderness present. Negative right straight leg raise test and negative left straight leg raise test.   Skin:     General: Skin is warm.      Nails: There is no clubbing.   Neurological:      General: No focal deficit present.      Mental Status: He is alert and oriented to person, place, and time.      Sensory: Sensation is intact.      Motor: Motor function is intact.      Coordination: Coordination is intact.      Gait: Gait is intact.   Psychiatric:         Attention and Perception: Attention and perception normal.         Mood and Affect: Mood and affect normal.         Speech: Speech normal.         Behavior: Behavior normal. Behavior is cooperative.         Thought Content: Thought content normal.         Cognition and Memory: Cognition normal.         Result Review :                 Assessment and Plan    Diagnoses and all orders for this visit:    1. Essential hypertension (Primary)  -     metoprolol succinate XL (TOPROL-XL) 25 MG 24 hr tablet; Take 1 tablet by mouth Daily for 30 days.  Dispense: 30 tablet; Refill: 0    2. Anxiety  -     Ambulatory Referral to Psychiatry    3. Type 2 diabetes mellitus with diabetic neuropathy, with long-term current use of insulin (CMS/[facility])    4. Chronic midline low back pain without sciatica  -     diclofenac (VOLTAREN) 50 MG EC tablet; Take 1 tablet by mouth 2 (Two) Times a Day for 30 days. Take with food  Dispense: 60 tablet; Refill: 0    5. Chronic neck pain    6. Encounter for immunization    Other orders  -     Tdap Vaccine Greater Than or Equal To 8yo IM        Follow Up   Return in about 2 weeks (around 5/25/2021).  Patient was given instructions and counseling regarding his condition or for health maintenance advice. Please see specific information pulled into the AVS if appropriate.     The patient was advised to start taking metoprolol succinate XL 25 mg p.o. daily instead of taking it half daily.  The patient was given a log to write down his blood pressure readings daily for the next couple of weeks.  The patient was also advised to list what medications are given by [facility], by his previous PCP and by [facility].  He also states that he does not need refills on his medications right now.  The patient was again advised to get records from his previous PCP so that we will know exactly why he is still on gabapentin and lorazepam.  The patient will again be referred to the psychiatrist.  The patient was counseled on the importance of compliance.            This document has been electronically signed by Alberto Green MD  May 11, 2021 16:20 EDT

## 2021-06-01 ENCOUNTER — TELEPHONE (OUTPATIENT)
Dept: PSYCHIATRY | Facility: CLINIC | Age: 71
End: 2021-06-01

## 2021-06-01 NOTE — TELEPHONE ENCOUNTER
Spoke with patient , he is going get his mychart setup and call our office back to schedule slade.

## 2021-06-04 DIAGNOSIS — M54.50 CHRONIC MIDLINE LOW BACK PAIN WITHOUT SCIATICA: ICD-10-CM

## 2021-06-04 DIAGNOSIS — G89.29 CHRONIC MIDLINE LOW BACK PAIN WITHOUT SCIATICA: ICD-10-CM

## 2021-06-14 ENCOUNTER — OFFICE VISIT (OUTPATIENT)
Dept: FAMILY MEDICINE CLINIC | Facility: CLINIC | Age: 71
End: 2021-06-14

## 2021-06-14 VITALS
OXYGEN SATURATION: 97 % | BODY MASS INDEX: 29.67 KG/M2 | SYSTOLIC BLOOD PRESSURE: 172 MMHG | WEIGHT: 238.6 LBS | TEMPERATURE: 97 F | HEART RATE: 68 BPM | HEIGHT: 75 IN | DIASTOLIC BLOOD PRESSURE: 68 MMHG

## 2021-06-14 DIAGNOSIS — M25.512 ACUTE PAIN OF LEFT SHOULDER: ICD-10-CM

## 2021-06-14 DIAGNOSIS — I10 ESSENTIAL HYPERTENSION: Primary | ICD-10-CM

## 2021-06-14 DIAGNOSIS — M54.50 CHRONIC MIDLINE LOW BACK PAIN WITHOUT SCIATICA: ICD-10-CM

## 2021-06-14 DIAGNOSIS — G89.29 CHRONIC MIDLINE LOW BACK PAIN WITHOUT SCIATICA: ICD-10-CM

## 2021-06-14 DIAGNOSIS — Z12.11 ENCOUNTER FOR SCREENING FOR MALIGNANT NEOPLASM OF COLON: ICD-10-CM

## 2021-06-14 PROCEDURE — 99214 OFFICE O/P EST MOD 30 MIN: CPT | Performed by: FAMILY MEDICINE

## 2021-06-14 RX ORDER — METOPROLOL SUCCINATE 50 MG/1
50 TABLET, EXTENDED RELEASE ORAL DAILY
Qty: 30 TABLET | Refills: 0 | Status: SHIPPED | OUTPATIENT
Start: 2021-06-14 | End: 2021-07-15

## 2021-06-14 NOTE — PATIENT INSTRUCTIONS
Hypertension, Adult  Hypertension is another name for high blood pressure. High blood pressure forces your heart to work harder to pump blood. This can cause problems over time.  There are two numbers in a blood pressure reading. There is a top number (systolic) over a bottom number (diastolic). It is best to have a blood pressure that is below 120/80. Healthy choices can help lower your blood pressure, or you may need medicine to help lower it.  What are the causes?  The cause of this condition is not known. Some conditions may be related to high blood pressure.  What increases the risk?  · Smoking.  · Having type 2 diabetes mellitus, high cholesterol, or both.  · Not getting enough exercise or physical activity.  · Being overweight.  · Having too much fat, sugar, calories, or salt (sodium) in your diet.  · Drinking too much alcohol.  · Having long-term (chronic) kidney disease.  · Having a family history of high blood pressure.  · Age. Risk increases with age.  · Race. You may be at higher risk if you are .  · Gender. Men are at higher risk than women before age 45. After age 65, women are at higher risk than men.  · Having obstructive sleep apnea.  · Stress.  What are the signs or symptoms?  · High blood pressure may not cause symptoms. Very high blood pressure (hypertensive crisis) may cause:  ? Headache.  ? Feelings of worry or nervousness (anxiety).  ? Shortness of breath.  ? Nosebleed.  ? A feeling of being sick to your stomach (nausea).  ? Throwing up (vomiting).  ? Changes in how you see.  ? Very bad chest pain.  ? Seizures.  How is this treated?  · This condition is treated by making healthy lifestyle changes, such as:  ? Eating healthy foods.  ? Exercising more.  ? Drinking less alcohol.  · Your health care provider may prescribe medicine if lifestyle changes are not enough to get your blood pressure under control, and if:  ? Your top number is above 130.  ? Your bottom number is above  80.  · Your personal target blood pressure may vary.  Follow these instructions at home:  Eating and drinking    · If told, follow the DASH eating plan. To follow this plan:  ? Fill one half of your plate at each meal with fruits and vegetables.  ? Fill one fourth of your plate at each meal with whole grains. Whole grains include whole-wheat pasta, brown rice, and whole-grain bread.  ? Eat or drink low-fat dairy products, such as skim milk or low-fat yogurt.  ? Fill one fourth of your plate at each meal with low-fat (lean) proteins. Low-fat proteins include fish, chicken without skin, eggs, beans, and tofu.  ? Avoid fatty meat, cured and processed meat, or chicken with skin.  ? Avoid pre-made or processed food.  · Eat less than 1,500 mg of salt each day.  · Do not drink alcohol if:  ? Your doctor tells you not to drink.  ? You are pregnant, may be pregnant, or are planning to become pregnant.  · If you drink alcohol:  ? Limit how much you use to:  § 0-1 drink a day for women.  § 0-2 drinks a day for men.  ? Be aware of how much alcohol is in your drink. In the [place] one drink equals one 12 oz bottle of beer (355 mL), one 5 oz glass of wine (148 mL), or one 1½ oz glass of hard liquor (44 mL).  Lifestyle    · Work with your doctor to stay at a healthy weight or to lose weight. Ask your doctor what the best weight is for you.  · Get at least 30 minutes of exercise most days of the week. This may include walking, swimming, or biking.  · Get at least 30 minutes of exercise that strengthens your muscles (resistance exercise) at least 3 days a week. This may include lifting weights or doing Pilates.  · Do not use any products that contain nicotine or tobacco, such as cigarettes, e-cigarettes, and chewing tobacco. If you need help quitting, ask your doctor.  · Check your blood pressure at home as told by your doctor.  · Keep all follow-up visits as told by your doctor. This is important.  Medicines  · Take over-the-counter  and prescription medicines only as told by your doctor. Follow directions carefully.  · Do not skip doses of blood pressure medicine. The medicine does not work as well if you skip doses. Skipping doses also puts you at risk for problems.  · Ask your doctor about side effects or reactions to medicines that you should watch for.  Contact a doctor if you:  · Think you are having a reaction to the medicine you are taking.  · Have headaches that keep coming back (recurring).  · Feel dizzy.  · Have swelling in your ankles.  · Have trouble with your vision.  Get help right away if you:  · Get a very bad headache.  · Start to feel mixed up (confused).  · Feel weak or numb.  · Feel faint.  · Have very bad pain in your:  ? Chest.  ? Belly (abdomen).  · Throw up more than once.  · Have trouble breathing.  Summary  · Hypertension is another name for high blood pressure.  · High blood pressure forces your heart to work harder to pump blood.  · For most people, a normal blood pressure is less than 120/80.  · Making healthy choices can help lower blood pressure. If your blood pressure does not get lower with healthy choices, you may need to take medicine.  This information is not intended to replace advice given to you by your health care provider. Make sure you discuss any questions you have with your health care provider.  Document Revised: 08/28/2019 Document Reviewed: 08/28/2019  [employer] Patient Education © 2021 [employer]    Shoulder Pain  Many things can cause shoulder pain, including:  · An injury.  · Moving the shoulder in the same way again and again (overuse).  · Joint pain (arthritis).  Pain can come from:  · Swelling and irritation (inflammation) of any part of the shoulder.  · An injury to the shoulder joint.  · An injury to:  ? Tissues that connect muscle to bone (tendons).  ? Tissues that connect bones to each other (ligaments).  ? Bones.  Follow these instructions at home:  Watch for changes in your symptoms.  Let your doctor know about them. Follow these instructions to help with your pain.  If you have a sling:  · Wear the sling as told by your doctor. Remove it only as told by your doctor.  · Loosen the sling if your fingers:  ? Tingle.  ? Become numb.  ? Turn cold and blue.  · Keep the sling clean.  · If the sling is not waterproof:  ? Do not let it get wet.  ? Take the sling off when you shower or bathe.  Managing pain, stiffness, and swelling    · If told, put ice on the painful area:  ? Put ice in a plastic bag.  ? Place a towel between your skin and the bag.  ? Leave the ice on for 20 minutes, 2-3 times a day. Stop putting ice on if it does not help with the pain.  · Squeeze a soft ball or a foam pad as much as possible. This prevents swelling in the shoulder. It also helps to strengthen the arm.  General instructions  · Take over-the-counter and prescription medicines only as told by your doctor.  · Keep all follow-up visits as told by your doctor. This is important.  Contact a doctor if:  · Your pain gets worse.  · Medicine does not help your pain.  · You have new pain in your arm, hand, or fingers.  Get help right away if:  · Your arm, hand, or fingers:  ? Tingle.  ? Are numb.  ? Are swollen.  ? Are painful.  ? Turn white or blue.  Summary  · Shoulder pain can be caused by many things. These include injury, moving the shoulder in the same away again and again, and joint pain.  · Watch for changes in your symptoms. Let your doctor know about them.  · This condition may be treated with a sling, ice, and pain medicine.  · Contact your doctor if the pain gets worse or you have new pain. Get help right away if your arm, hand, or fingers tingle or get numb, swollen, or painful.  · Keep all follow-up visits as told by your doctor. This is important.  This information is not intended to replace advice given to you by your health care provider. Make sure you discuss any questions you have with your health care  provider.  Document Revised: 07/02/2019 Document Reviewed: 07/02/2019  [employer] Patient Education © 2021 [employer]

## 2021-06-14 NOTE — PROGRESS NOTES
"Chief Complaint  Follow-up (r/t HTN and med changes on previous visit), Neck Pain (X several yrs.), and Shoulder Pain (X 6 yrs Pt. reports previously having inj. for the pain. states inj. helped for little while.)    Van Henry presents to [facility]  The patient is here for follow-up on his hypertension, he increase his metoprolol to 25 mg last visit and we advised him to check and record his blood pressure at home.  Patient brought in the recording of his blood pressure was still elevated and almost all of the reading is higher than 140 systolic but the diastolic is mostly below 90.  The patient still was not able to bring the [facility], from [facility] [employer] and from his previous family doctor.  The patient is still complaining of the left shoulder, patient states that the pain is persistent but now his range of motion has gotten worse.  He denies any injury to the shoulder.  The patient states that if is not for the diclofenac it would probably have been really worse.  The patient used to take Celebrex and Mobic but they did not help him as much as the diclofenac.      Objective   Vital Signs:   /68   Pulse 68   Temp 97 °F (36.1 °C) (Temporal)   Ht 190.5 cm (75\")   Wt 108 kg (238 lb 9.6 oz)   SpO2 97%   BMI 29.82 kg/m²     Physical Exam  Vitals and nursing note reviewed.   Constitutional:       General: He is not in acute distress.     Appearance: Normal appearance. He is well-developed and well-groomed.   HENT:      Head: Normocephalic and atraumatic.      Jaw: No tenderness or pain on movement.      Salivary Glands: Right salivary gland is not diffusely enlarged. Left salivary gland is not diffusely enlarged.      Right Ear: Tympanic membrane and ear canal normal.      Left Ear: Tympanic membrane and ear canal normal.      Nose: No congestion or rhinorrhea.      Mouth/Throat:      Mouth: Mucous membranes are moist.      " Pharynx: No oropharyngeal exudate or posterior oropharyngeal erythema.   Eyes:      General: Lids are normal. No allergic shiner or scleral icterus.     Conjunctiva/sclera: Conjunctivae normal.      Pupils: Pupils are equal, round, and reactive to light.   Neck:      Thyroid: No thyroid mass, thyromegaly or thyroid tenderness.      Trachea: Trachea normal.   Cardiovascular:      Rate and Rhythm: Normal rate and regular rhythm.  No extrasystoles are present.     Pulses: Normal pulses.      Heart sounds: Normal heart sounds. No murmur heard.     Pulmonary:      Effort: Pulmonary effort is normal. No respiratory distress.      Breath sounds: Normal breath sounds. No decreased breath sounds, wheezing, rhonchi or rales.   Chest:      Breasts:         Right: Normal.         Left: Normal.   Abdominal:      General: Bowel sounds are normal.      Palpations: Abdomen is soft.      Tenderness: There is no abdominal tenderness. There is no right CVA tenderness, left CVA tenderness, guarding or rebound.   Musculoskeletal:      Right shoulder: Normal.      Left shoulder: No swelling, deformity, effusion, tenderness, bony tenderness or crepitus. Decreased range of motion ( decrease abduction and extension, no tenderness but with pain on movement). Normal strength.      Cervical back: Neck supple.      Right lower leg: No edema.      Left lower leg: No edema.   Lymphadenopathy:      Cervical: No cervical adenopathy.      Upper Body:      Right upper body: No supraclavicular or axillary adenopathy.      Left upper body: No supraclavicular or axillary adenopathy.   Skin:     General: Skin is warm.      Nails: There is no clubbing.   Neurological:      General: No focal deficit present.      Mental Status: He is alert and oriented to person, place, and time.      Sensory: Sensation is intact.      Motor: Motor function is intact.      Coordination: Coordination is intact.   Psychiatric:         Attention and Perception: Attention and  perception normal.         Mood and Affect: Mood normal.         Speech: Speech normal.         Behavior: Behavior normal. Behavior is cooperative.         Thought Content: Thought content normal.     Left shoulder x-ray was performed this visit.  Indication: Left shoulder pain  Interpretation/Findings: No acute fractures or dislocations  No comparison or previous X-ray was looked at today.   The x-ray will be officially read by the radiologist.    Result Review :     Most Recent A1C    HGBA1C Most Recent 4/5/21   Hemoglobin A1C 7.2 (A)   (A) Abnormal value       Comments are available for some flowsheets but are not being displayed.                     Assessment and Plan    Diagnoses and all orders for this visit:    1. Essential hypertension (Primary)  -     metoprolol succinate XL (Toprol XL) 50 MG 24 hr tablet; Take 1 tablet by mouth Daily for 30 days.  Dispense: 30 tablet; Refill: 0    2. Acute pain of left shoulder  Comments:  ? Bursitis    Orders:  -     XR Shoulder 2+ View Left (In Office)  -     Ambulatory Referral to Physical Therapy    3. Chronic midline low back pain without sciatica  -     diclofenac (VOLTAREN) 50 MG EC tablet; Take 1 tablet by mouth 2 (Two) Times a Day for 30 days. Take with food  Dispense: 60 tablet; Refill: 0        Follow Up   Return in about 1 month (around 7/14/2021).  Patient was given instructions and counseling regarding his condition or for health maintenance advice. Please see specific information pulled into the AVS if appropriate.     Will increase metoprolol succinate to 50 mg daily.  We will continue diclofenac 51 p.o. twice daily and will refer the patient to physical therapy to evaluate and treat the left shoulder.  The patient was advised to continue all other medications.  The patient was advised to call psychiatry and to follow-up on the colonoscopy schedule referred him to both of them.  The patient was also advised rest apply moist heat to the left shoulder.  He  was again counseled regarding the importance of diet, exercise and compliance.              This document has been electronically signed by Alberto Green MD  June 14, 2021 16:36 EDT

## 2021-07-10 DIAGNOSIS — I10 ESSENTIAL HYPERTENSION: ICD-10-CM

## 2021-07-11 DIAGNOSIS — M54.50 CHRONIC MIDLINE LOW BACK PAIN WITHOUT SCIATICA: ICD-10-CM

## 2021-07-11 DIAGNOSIS — G89.29 CHRONIC MIDLINE LOW BACK PAIN WITHOUT SCIATICA: ICD-10-CM

## 2021-07-12 RX ORDER — METOPROLOL SUCCINATE 50 MG/1
TABLET, EXTENDED RELEASE ORAL
Qty: 30 TABLET | Refills: 0 | OUTPATIENT
Start: 2021-07-12

## 2021-07-13 ENCOUNTER — OFFICE VISIT (OUTPATIENT)
Dept: FAMILY MEDICINE CLINIC | Facility: CLINIC | Age: 71
End: 2021-07-13

## 2021-07-13 VITALS
SYSTOLIC BLOOD PRESSURE: 163 MMHG | TEMPERATURE: 97.1 F | WEIGHT: 238 LBS | BODY MASS INDEX: 29.59 KG/M2 | HEIGHT: 75 IN | OXYGEN SATURATION: 98 % | RESPIRATION RATE: 18 BRPM | DIASTOLIC BLOOD PRESSURE: 94 MMHG | HEART RATE: 78 BPM

## 2021-07-13 DIAGNOSIS — I10 ESSENTIAL HYPERTENSION: Primary | ICD-10-CM

## 2021-07-13 DIAGNOSIS — G89.29 CHRONIC NECK PAIN: ICD-10-CM

## 2021-07-13 DIAGNOSIS — M54.50 CHRONIC MIDLINE LOW BACK PAIN WITHOUT SCIATICA: ICD-10-CM

## 2021-07-13 DIAGNOSIS — M54.2 CHRONIC NECK PAIN: ICD-10-CM

## 2021-07-13 DIAGNOSIS — G89.29 CHRONIC MIDLINE LOW BACK PAIN WITHOUT SCIATICA: ICD-10-CM

## 2021-07-13 PROCEDURE — 99213 OFFICE O/P EST LOW 20 MIN: CPT | Performed by: FAMILY MEDICINE

## 2021-07-13 RX ORDER — CHLORTHALIDONE 25 MG/1
25 TABLET ORAL DAILY
Qty: 30 TABLET | Refills: 0 | Status: SHIPPED | OUTPATIENT
Start: 2021-07-13 | End: 2021-08-11 | Stop reason: SINTOL

## 2021-07-13 NOTE — PATIENT INSTRUCTIONS
Hypertension, Adult  Hypertension is another name for high blood pressure. High blood pressure forces your heart to work harder to pump blood. This can cause problems over time.  There are two numbers in a blood pressure reading. There is a top number (systolic) over a bottom number (diastolic). It is best to have a blood pressure that is below 120/80. Healthy choices can help lower your blood pressure, or you may need medicine to help lower it.  What are the causes?  The cause of this condition is not known. Some conditions may be related to high blood pressure.  What increases the risk?  · Smoking.  · Having type 2 diabetes mellitus, high cholesterol, or both.  · Not getting enough exercise or physical activity.  · Being overweight.  · Having too much fat, sugar, calories, or salt (sodium) in your diet.  · Drinking too much alcohol.  · Having long-term (chronic) kidney disease.  · Having a family history of high blood pressure.  · Age. Risk increases with age.  · Race. You may be at higher risk if you are .  · Gender. Men are at higher risk than women before age 45. After age 65, women are at higher risk than men.  · Having obstructive sleep apnea.  · Stress.  What are the signs or symptoms?  · High blood pressure may not cause symptoms. Very high blood pressure (hypertensive crisis) may cause:  ? Headache.  ? Feelings of worry or nervousness (anxiety).  ? Shortness of breath.  ? Nosebleed.  ? A feeling of being sick to your stomach (nausea).  ? Throwing up (vomiting).  ? Changes in how you see.  ? Very bad chest pain.  ? Seizures.  How is this treated?  · This condition is treated by making healthy lifestyle changes, such as:  ? Eating healthy foods.  ? Exercising more.  ? Drinking less alcohol.  · Your health care provider may prescribe medicine if lifestyle changes are not enough to get your blood pressure under control, and if:  ? Your top number is above 130.  ? Your bottom number is above  80.  · Your personal target blood pressure may vary.  Follow these instructions at home:  Eating and drinking    · If told, follow the DASH eating plan. To follow this plan:  ? Fill one half of your plate at each meal with fruits and vegetables.  ? Fill one fourth of your plate at each meal with whole grains. Whole grains include whole-wheat pasta, brown rice, and whole-grain bread.  ? Eat or drink low-fat dairy products, such as skim milk or low-fat yogurt.  ? Fill one fourth of your plate at each meal with low-fat (lean) proteins. Low-fat proteins include fish, chicken without skin, eggs, beans, and tofu.  ? Avoid fatty meat, cured and processed meat, or chicken with skin.  ? Avoid pre-made or processed food.  · Eat less than 1,500 mg of salt each day.  · Do not drink alcohol if:  ? Your doctor tells you not to drink.  ? You are pregnant, may be pregnant, or are planning to become pregnant.  · If you drink alcohol:  ? Limit how much you use to:  § 0-1 drink a day for women.  § 0-2 drinks a day for men.  ? Be aware of how much alcohol is in your drink. In the [place] one drink equals one 12 oz bottle of beer (355 mL), one 5 oz glass of wine (148 mL), or one 1½ oz glass of hard liquor (44 mL).  Lifestyle    · Work with your doctor to stay at a healthy weight or to lose weight. Ask your doctor what the best weight is for you.  · Get at least 30 minutes of exercise most days of the week. This may include walking, swimming, or biking.  · Get at least 30 minutes of exercise that strengthens your muscles (resistance exercise) at least 3 days a week. This may include lifting weights or doing Pilates.  · Do not use any products that contain nicotine or tobacco, such as cigarettes, e-cigarettes, and chewing tobacco. If you need help quitting, ask your doctor.  · Check your blood pressure at home as told by your doctor.  · Keep all follow-up visits as told by your doctor. This is important.  Medicines  · Take over-the-counter  and prescription medicines only as told by your doctor. Follow directions carefully.  · Do not skip doses of blood pressure medicine. The medicine does not work as well if you skip doses. Skipping doses also puts you at risk for problems.  · Ask your doctor about side effects or reactions to medicines that you should watch for.  Contact a doctor if you:  · Think you are having a reaction to the medicine you are taking.  · Have headaches that keep coming back (recurring).  · Feel dizzy.  · Have swelling in your ankles.  · Have trouble with your vision.  Get help right away if you:  · Get a very bad headache.  · Start to feel mixed up (confused).  · Feel weak or numb.  · Feel faint.  · Have very bad pain in your:  ? Chest.  ? Belly (abdomen).  · Throw up more than once.  · Have trouble breathing.  Summary  · Hypertension is another name for high blood pressure.  · High blood pressure forces your heart to work harder to pump blood.  · For most people, a normal blood pressure is less than 120/80.  · Making healthy choices can help lower blood pressure. If your blood pressure does not get lower with healthy choices, you may need to take medicine.  This information is not intended to replace advice given to you by your health care provider. Make sure you discuss any questions you have with your health care provider.  Document Revised: 08/28/2019 Document Reviewed: 08/28/2019  [employer] Patient Education © 2021 [employer]    Chronic Back Pain  When back pain lasts longer than 3 months, it is called chronic back pain. Pain may get worse at certain times (flare-ups). There are things you can do at home to manage your pain.  Follow these instructions at home:  Pay attention to any changes in your symptoms. Take these actions to help with your pain:  Managing pain and stiffness         · If told, put ice on the painful area. Your doctor may tell you to use ice for 24-48 hours after the flare-up starts. To do this:  ? Put ice  in a plastic bag.  ? Place a towel between your skin and the bag.  ? Leave the ice on for 20 minutes, 2-3 times a day.  · If told, put heat on the painful area. Do this as often as told by your doctor. Use the heat source that your doctor recommends, such as a moist heat pack or a heating pad.  ? Place a towel between your skin and the heat source.  ? Leave the heat on for 20-30 minutes.  ? Take off the heat if your skin turns bright red. This is especially important if you are unable to feel pain, heat, or cold. You may have a greater risk of getting burned.  · Soak in a warm bath. This can help relieve pain.  Activity    · Avoid bending and other activities that make pain worse.  · When standing:  ? Keep your upper back and neck straight.  ? Keep your shoulders pulled back.  ? Avoid slouching.  · When sitting:  ? Keep your back straight.  ? Relax your shoulders. Do not round your shoulders or pull them backward.  · Do not sit or  one place for long periods of time.  · Take short rest breaks during the day. Lying down or standing is usually better than sitting. Resting can help relieve pain.  · When sitting or lying down for a long time, do some mild activity or stretching. This will help to prevent stiffness and pain.  · Get regular exercise. Ask your doctor what activities are safe for you.  · Do not lift anything that is heavier than 10 lb (4.5 kg) or the limit that you are told, until your doctor says that it is safe.  · To prevent injury when you lift things:  ? Bend your knees.  ? Keep the weight close to your body.  ? Avoid twisting.  · Sleep on a firm mattress. Try lying on your side with your knees slightly bent. If you lie on your back, put a pillow under your knees.  Medicines  · Treatment may include medicines for pain and swelling taken by mouth or put on the skin, prescription pain medicine, or muscle relaxants.  · Take over-the-counter and prescription medicines only as told by your  doctor.  · Ask your doctor if the medicine prescribed to you:  ? Requires you to avoid driving or using machinery.  ? Can cause trouble pooping (constipation). You may need to take these actions to prevent or treat trouble pooping:  § Drink enough fluid to keep your pee (urine) pale yellow.  § Take over-the-counter or prescription medicines.  § Eat foods that are high in fiber. These include beans, whole grains, and fresh fruits and vegetables.  § Limit foods that are high in fat and sugars. These include fried or sweet foods.  General instructions  · Do not use any products that contain nicotine or tobacco, such as cigarettes, e-cigarettes, and chewing tobacco. If you need help quitting, ask your doctor.  · Keep all follow-up visits as told by your doctor. This is important.  Contact a doctor if:  · Your pain does not get better with rest or medicine.  · Your pain gets worse, or you have new pain.  · You have a high fever.  · You lose weight very quickly.  · You have trouble doing your normal activities.  Get help right away if:  · One or both of your legs or feet feel weak.  · One or both of your legs or feet lose feeling (have numbness).  · You have trouble controlling when you poop (have a bowel movement) or pee (urinate).  · You have bad back pain and:  ? You feel like you may vomit (nauseous), or you vomit.  ? You have pain in your belly (abdomen).  ? You have shortness of breath.  ? You faint.  Summary  · When back pain lasts longer than 3 months, it is called chronic back pain.  · Pain may get worse at certain times (flare-ups).  · Use ice and heat as told by your doctor. Your doctor may tell you to use ice after flare-ups.  This information is not intended to replace advice given to you by your health care provider. Make sure you discuss any questions you have with your health care provider.  Document Revised: 01/27/2021 Document Reviewed: 01/27/2021  [employer] Patient Education © 2021 [employer]

## 2021-07-13 NOTE — PROGRESS NOTES
"Chief Complaint  Hypertension (Follow up X 1 month ) and Arthritis (in All joints since rain started. basically all over body.)    Subjective          Panda Henry presents to [facility]  The patient is here today for follow-up on his hypertension, the patient states that his blood pressure is still staying elevated at home when he checks it.  The patient is also complaining of the persistent neck and lower back pain, he was seeing a neurosurgeon in the past who did MRIs on his lower back and neck.  He states that he tried to get his records from his family doctor and they told him that they will fax the records here at the clinic.  The patient was also referred to a psychiatrist but he was not able to get in touch with them and he is wanting to get their phone number so he could re schedule.  The patient states that the left shoulder is doing a lot better compared to his previous visit but the pain is still there.  The patient is supposed to start physical therapy sometime next week.      Objective   Vital Signs:   /94 (BP Location: Right arm, Patient Position: Sitting, Cuff Size: Adult)   Pulse 78   Temp 97.1 °F (36.2 °C)   Resp 18   Ht 190.5 cm (75\")   Wt 108 kg (238 lb)   SpO2 98%   BMI 29.75 kg/m²     Physical Exam  Vitals and nursing note reviewed.   Constitutional:       General: He is not in acute distress.     Appearance: Normal appearance. He is well-developed and well-groomed.   HENT:      Head: Normocephalic and atraumatic.      Jaw: No tenderness or pain on movement.      Salivary Glands: Right salivary gland is not diffusely enlarged. Left salivary gland is not diffusely enlarged.      Right Ear: Tympanic membrane and ear canal normal.      Left Ear: Tympanic membrane and ear canal normal.      Nose: No congestion or rhinorrhea.      Mouth/Throat:      Mouth: Mucous membranes are moist.      Pharynx: No oropharyngeal exudate or posterior oropharyngeal " erythema.   Eyes:      General: Lids are normal. No allergic shiner or scleral icterus.     Conjunctiva/sclera: Conjunctivae normal.      Pupils: Pupils are equal, round, and reactive to light.   Neck:      Thyroid: No thyroid mass, thyromegaly or thyroid tenderness.      Trachea: Trachea normal.   Cardiovascular:      Rate and Rhythm: Normal rate and regular rhythm.  No extrasystoles are present.     Pulses: Normal pulses.      Heart sounds: Normal heart sounds. No murmur heard.     Pulmonary:      Effort: Pulmonary effort is normal. No respiratory distress.      Breath sounds: Normal breath sounds. No decreased breath sounds, wheezing, rhonchi or rales.   Chest:      Breasts:         Right: Normal.         Left: Normal.   Abdominal:      General: Bowel sounds are normal.      Palpations: Abdomen is soft.      Tenderness: There is no abdominal tenderness. There is no right CVA tenderness, left CVA tenderness, guarding or rebound.   Musculoskeletal:      Cervical back: Neck supple. Tenderness ( Cervical vertebral area and bilateral trapezius muscle area) present.      Lumbar back: Tenderness ( Lumbar vertebral area and bilateral lumbar paravertebral area) present. Negative right straight leg raise test and negative left straight leg raise test.      Right lower leg: No edema.      Left lower leg: No edema.   Lymphadenopathy:      Cervical: No cervical adenopathy.      Upper Body:      Right upper body: No supraclavicular or axillary adenopathy.      Left upper body: No supraclavicular or axillary adenopathy.   Skin:     General: Skin is warm.      Nails: There is no clubbing.   Neurological:      General: No focal deficit present.      Mental Status: He is alert and oriented to person, place, and time.      Sensory: Sensation is intact.      Motor: Motor function is intact.      Coordination: Coordination is intact.   Psychiatric:         Attention and Perception: Attention and perception normal.         Mood and  Affect: Mood normal.         Speech: Speech normal.         Behavior: Behavior normal. Behavior is cooperative.         Thought Content: Thought content normal.     C-spine x-ray was performed this visit.  Indication: Neck pain  Interpretation/Findings: No acute fractures or dislocations but with multilevel degenerative changes.  No comparison or previous X-ray was looked at today.   The x-ray will be officially read by the radiologist.    Result Review :                 Assessment and Plan    Diagnoses and all orders for this visit:    1. Essential hypertension (Primary)  -     chlorthalidone (HYGROTON) 25 MG tablet; Take 1 tablet by mouth Daily for 30 days.  Dispense: 30 tablet; Refill: 0    2. Chronic neck pain  -     XR Spine Cervical Complete 4 or 5 View (In Office)    3. Chronic midline low back pain without sciatica  -     diclofenac (VOLTAREN) 50 MG EC tablet; Take 1 tablet by mouth 2 (Two) Times a Day for 30 days. Take with food  Dispense: 60 tablet; Refill: 0        Follow Up   Return in about 1 month (around 8/13/2021).  Patient was given instructions and counseling regarding his condition or for health maintenance advice. Please see specific information pulled into the AVS if appropriate.     The patient was advised to continue all other regular medications as prescribed.  We will add chlorthalidone 25 mg 1 p.o. daily.  And will refill diclofenac 50 mg 1 p.o. twice daily.  The patient was advised to follow-up with the neurosurgeon regarding his neck and lower back pain.  The patient was counseled on the importance of diet, exercise and medication compliance.  Will recheck his blood pressure in 1 month.              This document has been electronically signed by Alberto Green MD  July 13, 2021 18:11 EDT

## 2021-07-14 ENCOUNTER — TELEPHONE (OUTPATIENT)
Dept: FAMILY MEDICINE CLINIC | Facility: CLINIC | Age: 71
End: 2021-07-14

## 2021-07-14 NOTE — TELEPHONE ENCOUNTER
Caller: ZELDA EVERETT    Relationship: Emergency Contact    Best call back number: [phone]    What is the best time to reach you: ANYTIME    Who are you requesting to speak with (clinical staff, provider,  specific staff member): CLINICAL STAFF    Do you know the name of the person who called: WIFE    What was the call regarding: PATIENTS WIFE STATES THAT SHE WOULD LIKE TO RECEIVE A CALL ABOUT HER HUSBANDS MEDCIATION.     Do you require a callback: YES

## 2021-07-15 DIAGNOSIS — I10 ESSENTIAL HYPERTENSION: ICD-10-CM

## 2021-07-15 RX ORDER — METOPROLOL SUCCINATE 50 MG/1
50 TABLET, EXTENDED RELEASE ORAL DAILY
Qty: 30 TABLET | Refills: 0 | Status: SHIPPED | OUTPATIENT
Start: 2021-07-15 | End: 2021-09-22 | Stop reason: SDUPTHER

## 2021-07-15 NOTE — TELEPHONE ENCOUNTER
I spoke to patient's wife on the phone concerning patient's Metoprolol. According to Dr. Green's Documentation on pt's last office visit on 7/13/21 pt is to continue medication (it was not discontinued). Patient is needing a refill to last until next appointment on 8/11/21.

## 2021-07-27 ENCOUNTER — OFFICE VISIT (OUTPATIENT)
Dept: FAMILY MEDICINE CLINIC | Facility: CLINIC | Age: 71
End: 2021-07-27

## 2021-07-27 VITALS
HEIGHT: 75 IN | OXYGEN SATURATION: 99 % | SYSTOLIC BLOOD PRESSURE: 154 MMHG | DIASTOLIC BLOOD PRESSURE: 73 MMHG | BODY MASS INDEX: 29.39 KG/M2 | RESPIRATION RATE: 20 BRPM | TEMPERATURE: 96.4 F | WEIGHT: 236.4 LBS | HEART RATE: 57 BPM

## 2021-07-27 DIAGNOSIS — J01.00 ACUTE NON-RECURRENT MAXILLARY SINUSITIS: Primary | ICD-10-CM

## 2021-07-27 DIAGNOSIS — L30.9 ECZEMA, UNSPECIFIED TYPE: ICD-10-CM

## 2021-07-27 PROBLEM — L29.9 ITCHING: Status: ACTIVE | Noted: 2021-07-27

## 2021-07-27 PROBLEM — L29.9 ITCHING: Status: RESOLVED | Noted: 2021-07-27 | Resolved: 2021-07-27

## 2021-07-27 PROCEDURE — 99213 OFFICE O/P EST LOW 20 MIN: CPT | Performed by: FAMILY MEDICINE

## 2021-07-27 RX ORDER — AMOXICILLIN 875 MG/1
875 TABLET, COATED ORAL 2 TIMES DAILY
Qty: 20 TABLET | Refills: 0 | Status: SHIPPED | OUTPATIENT
Start: 2021-07-27 | End: 2021-08-06

## 2021-07-27 RX ORDER — HYDROXYZINE HYDROCHLORIDE 25 MG/1
25 TABLET, FILM COATED ORAL 3 TIMES DAILY PRN
Qty: 30 TABLET | Refills: 0 | Status: SHIPPED | OUTPATIENT
Start: 2021-07-27 | End: 2021-08-06

## 2021-07-27 NOTE — PROGRESS NOTES
"Chief Complaint  Dizziness (blood pressure low), Fatigue (weakness today), Headache (today), Sinus Problem, and Cough    Subjective          Panda Henry presents to [facility]  The patient is here today because of nasal and postnasal drainage for 2 to 3 days, the patient also had an episode of lightheadedness and almost passing out this morning.  He denies any fever, body aching or exposure to anyone with COVID-19.  The patient thinks that it was his blood pressure that made him lightheaded this morning because he said it went down to around 140/60.  He denies any palpitations, chest pains or shortness of breath.  The patient is also wanting some prescription for Atarax because he stated that steroid creams do not usually help but the Atarax does.      Objective   Vital Signs:   /73 (BP Location: Left arm, Patient Position: Sitting, Cuff Size: Adult)   Pulse 57   Temp 96.4 °F (35.8 °C) (Temporal)   Resp 20   Ht 190.5 cm (75\")   Wt 107 kg (236 lb 6.4 oz)   SpO2 99%   BMI 29.55 kg/m²     Physical Exam  Vitals and nursing note reviewed.   Constitutional:       General: He is not in acute distress.     Appearance: Normal appearance. He is well-developed and well-groomed.   HENT:      Head: Normocephalic and atraumatic.      Jaw: No tenderness or pain on movement.      Salivary Glands: Right salivary gland is not diffusely enlarged. Left salivary gland is not diffusely enlarged.      Right Ear: Tympanic membrane and ear canal normal.      Left Ear: Tympanic membrane and ear canal normal.      Nose: Congestion present. No rhinorrhea.      Right Sinus: Maxillary sinus tenderness present. No frontal sinus tenderness.      Left Sinus: Maxillary sinus tenderness present. No frontal sinus tenderness.      Mouth/Throat:      Mouth: Mucous membranes are moist.      Pharynx: No oropharyngeal exudate or posterior oropharyngeal erythema.   Eyes:      General: Lids are normal. No " allergic shiner or scleral icterus.     Conjunctiva/sclera: Conjunctivae normal.      Pupils: Pupils are equal, round, and reactive to light.   Neck:      Thyroid: No thyroid mass, thyromegaly or thyroid tenderness.      Trachea: Trachea normal.   Cardiovascular:      Rate and Rhythm: Normal rate and regular rhythm.  No extrasystoles are present.     Pulses: Normal pulses.      Heart sounds: Normal heart sounds. No murmur heard.     Pulmonary:      Effort: Pulmonary effort is normal. No respiratory distress.      Breath sounds: Normal breath sounds. No decreased breath sounds, wheezing, rhonchi or rales.   Chest:      Breasts:         Right: Normal.         Left: Normal.   Abdominal:      General: Bowel sounds are normal.      Palpations: Abdomen is soft.      Tenderness: There is no abdominal tenderness. There is no right CVA tenderness, left CVA tenderness, guarding or rebound.   Musculoskeletal:      Cervical back: Neck supple.      Right lower leg: No edema.      Left lower leg: No edema.   Lymphadenopathy:      Cervical: No cervical adenopathy.      Upper Body:      Right upper body: No supraclavicular or axillary adenopathy.      Left upper body: No supraclavicular or axillary adenopathy.   Skin:     General: Skin is warm.      Findings: Rash ( Bilateral lower legs, erythematous, macular, with dry skin and excoriations) present.      Nails: There is no clubbing.   Neurological:      General: No focal deficit present.      Mental Status: He is alert and oriented to person, place, and time.      Sensory: Sensation is intact.      Motor: Motor function is intact.      Coordination: Coordination is intact.   Psychiatric:         Attention and Perception: Attention and perception normal.         Mood and Affect: Mood normal.         Speech: Speech normal.         Behavior: Behavior normal. Behavior is cooperative.         Thought Content: Thought content normal.        Result Review :                 Assessment and  Plan    Diagnoses and all orders for this visit:    1. Acute non-recurrent maxillary sinusitis (Primary)  -     amoxicillin (AMOXIL) 875 MG tablet; Take 1 tablet by mouth 2 (Two) Times a Day for 10 days.  Dispense: 20 tablet; Refill: 0    2. Eczema, unspecified type  -     hydrOXYzine (ATARAX) 25 MG tablet; Take 1 tablet by mouth 3 (Three) Times a Day As Needed for Itching for up to 10 days.  Dispense: 30 tablet; Refill: 0        Follow Up   Return if symptoms worsen or fail to improve.  Patient was given instructions and counseling regarding his condition or for health maintenance advice. Please see specific information pulled into the AVS if appropriate.     The patient was advised to continue the Zyrtec and the Flonase nasal spray and will give the patient patient a prescription for amoxicillin but advised the patient to only fill the medicine if he is not better in the next 3 to 4 days.  The patient will also be given some Atarax for the rash and itching of both lower extremities.  The patient was advised to continue all other regular medications as prescribed.  He was advised to return to clinic or go to the emergency room Tums worsen or fail to improve.              This document has been electronically signed by Alberto Green MD  July 27, 2021 17:43 EDT

## 2021-07-27 NOTE — PATIENT INSTRUCTIONS
Sinusitis, Adult  Sinusitis is soreness and swelling (inflammation) of your sinuses. Sinuses are hollow spaces in the bones around your face. They are located:  · Around your eyes.  · In the middle of your forehead.  · Behind your nose.  · In your cheekbones.  Your sinuses and nasal passages are lined with a fluid called mucus. Mucus drains out of your sinuses. Swelling can trap mucus in your sinuses. This lets germs (bacteria, virus, or fungus) grow, which leads to infection. Most of the time, this condition is caused by a virus.  What are the causes?  This condition is caused by:  · Allergies.  · Asthma.  · Germs.  · Things that block your nose or sinuses.  · Growths in the nose (nasal polyps).  · Chemicals or irritants in the air.  · Fungus (rare).  What increases the risk?  You are more likely to develop this condition if:  · You have a weak body defense system (immune system).  · You do a lot of swimming or diving.  · You use nasal sprays too much.  · You smoke.  What are the signs or symptoms?  The main symptoms of this condition are pain and a feeling of pressure around the sinuses. Other symptoms include:  · Stuffy nose (congestion).  · Runny nose (drainage).  · Swelling and warmth in the sinuses.  · Headache.  · Toothache.  · A cough that may get worse at night.  · Mucus that collects in the throat or the back of the nose (postnasal drip).  · Being unable to smell and taste.  · Being very tired (fatigue).  · A fever.  · Sore throat.  · Bad breath.  How is this diagnosed?  This condition is diagnosed based on:  · Your symptoms.  · Your medical history.  · A physical exam.  · Tests to find out if your condition is short-term (acute) or long-term (chronic). Your doctor may:  ? Check your nose for growths (polyps).  ? Check your sinuses using a tool that has a light (endoscope).  ? Check for allergies or germs.  ? Do imaging tests, such as an MRI or CT scan.  How is this treated?  Treatment for this condition  depends on the cause and whether it is short-term or long-term.  · If caused by a virus, your symptoms should go away on their own within 10 days. You may be given medicines to relieve symptoms. They include:  ? Medicines that shrink swollen tissue in the nose.  ? Medicines that treat allergies (antihistamines).  ? A spray that treats swelling of the nostrils.   ? Rinses that help get rid of thick mucus in your nose (nasal saline washes).  · If caused by bacteria, your doctor may wait to see if you will get better without treatment. You may be given antibiotic medicine if you have:  ? A very bad infection.  ? A weak body defense system.  · If caused by growths in the nose, you may need to have surgery.  Follow these instructions at home:  Medicines  · Take, use, or apply over-the-counter and prescription medicines only as told by your doctor. These may include nasal sprays.  · If you were prescribed an antibiotic medicine, take it as told by your doctor. Do not stop taking the antibiotic even if you start to feel better.  Hydrate and humidify    · Drink enough water to keep your pee (urine) pale yellow.  · Use a cool mist humidifier to keep the humidity level in your home above 50%.  · Breathe in steam for 10-15 minutes, 3-4 times a day, or as told by your doctor. You can do this in the bathroom while a hot shower is running.  · Try not to spend time in cool or dry air.  Rest  · Rest as much as you can.  · Sleep with your head raised (elevated).  · Make sure you get enough sleep each night.  General instructions    · Put a warm, moist washcloth on your face 3-4 times a day, or as often as told by your doctor. This will help with discomfort.  · Wash your hands often with soap and water. If there is no soap and water, use hand .  · Do not smoke. Avoid being around people who are smoking (secondhand smoke).  · Keep all follow-up visits as told by your doctor. This is important.  Contact a doctor if:  · You  have a fever.  · Your symptoms get worse.  · Your symptoms do not get better within 10 days.  Get help right away if:  · You have a very bad headache.  · You cannot stop throwing up (vomiting).  · You have very bad pain or swelling around your face or eyes.  · You have trouble seeing.  · You feel confused.  · Your neck is stiff.  · You have trouble breathing.  Summary  · Sinusitis is swelling of your sinuses. Sinuses are hollow spaces in the bones around your face.  · This condition is caused by tissues in your nose that become inflamed or swollen. This traps germs. These can lead to infection.  · If you were prescribed an antibiotic medicine, take it as told by your doctor. Do not stop taking it even if you start to feel better.  · Keep all follow-up visits as told by your doctor. This is important.  This information is not intended to replace advice given to you by your health care provider. Make sure you discuss any questions you have with your health care provider.  Document Revised: 05/20/2019 Document Reviewed: 05/20/2019  [employer] Patient Education © 2021 [employer]

## 2021-08-03 ENCOUNTER — TELEPHONE (OUTPATIENT)
Dept: FAMILY MEDICINE CLINIC | Facility: CLINIC | Age: 71
End: 2021-08-03

## 2021-08-03 NOTE — TELEPHONE ENCOUNTER
Caller: ZELDA EVERETT    Relationship: Emergency Contact    Best call back number: [phone]    What is the medical concern/diagnosis: medication    What specialty or service is being requested: psychiatrist    What is the provider, practice or medical service name:     What is the office location:     What is the office phone number:     Any additional details: Patient was referred to a provider in [place] and the patient is unable to make the trip.  Patient would like someone in [place].

## 2021-08-05 DIAGNOSIS — M54.50 CHRONIC MIDLINE LOW BACK PAIN WITHOUT SCIATICA: ICD-10-CM

## 2021-08-05 DIAGNOSIS — G89.29 CHRONIC MIDLINE LOW BACK PAIN WITHOUT SCIATICA: ICD-10-CM

## 2021-08-05 NOTE — TELEPHONE ENCOUNTER
Rx Refill Note  Requested Prescriptions     Refused Prescriptions Disp Refills   • diclofenac (VOLTAREN) 50 MG EC tablet [Pharmacy Med Name: DICLOFENAC SOD EC 50 MG TAB] 60 tablet 0     Sig: TAKE 1 TABLET BY MOUTH 2 (TWO) TIMES A DAY FOR 30 DAYS. TAKE WITH FOOD     Refused By: CULLEN MURPHY     Reason for Refusal: Patient does not need medication refilled at this time      Last office visit with prescribing clinician: 7/27/2021      Next office visit with prescribing  clinician: 8/11/2021   {TIP  Encounters:          Carol Gamez MA  08/05/21, 15:15 EDT

## 2021-08-06 DIAGNOSIS — I10 ESSENTIAL HYPERTENSION: ICD-10-CM

## 2021-08-06 RX ORDER — METOPROLOL SUCCINATE 50 MG/1
TABLET, EXTENDED RELEASE ORAL
Qty: 30 TABLET | Refills: 0 | OUTPATIENT
Start: 2021-08-06

## 2021-08-06 NOTE — TELEPHONE ENCOUNTER
Rx Refill Note  Requested Prescriptions     Pending Prescriptions Disp Refills   • metoprolol succinate XL (TOPROL-XL) 50 MG 24 hr tablet [Pharmacy Med Name: METOPROLOL SUCC ER 50 MG TAB] 30 tablet 0     Sig: TAKE 1 TABLET BY MOUTH EVERY DAY      Last office visit with prescribing clinician: Visit date not found      Next office visit with prescribing clinician: Visit date not found            Carol Gamez MA  08/06/21, 16:53 EDT

## 2021-08-11 ENCOUNTER — OFFICE VISIT (OUTPATIENT)
Dept: FAMILY MEDICINE CLINIC | Facility: CLINIC | Age: 71
End: 2021-08-11

## 2021-08-11 VITALS
TEMPERATURE: 98.3 F | DIASTOLIC BLOOD PRESSURE: 62 MMHG | BODY MASS INDEX: 29.34 KG/M2 | OXYGEN SATURATION: 99 % | SYSTOLIC BLOOD PRESSURE: 124 MMHG | WEIGHT: 236 LBS | HEIGHT: 75 IN | HEART RATE: 87 BPM

## 2021-08-11 DIAGNOSIS — I10 ESSENTIAL HYPERTENSION: Primary | ICD-10-CM

## 2021-08-11 DIAGNOSIS — R42 VERTIGO: ICD-10-CM

## 2021-08-11 PROCEDURE — 99213 OFFICE O/P EST LOW 20 MIN: CPT | Performed by: FAMILY MEDICINE

## 2021-08-11 RX ORDER — HYDROXYZINE PAMOATE 50 MG/1
50 CAPSULE ORAL 3 TIMES DAILY PRN
Qty: 21 CAPSULE | Refills: 0 | Status: SHIPPED | OUTPATIENT
Start: 2021-08-11 | End: 2021-08-30

## 2021-08-11 NOTE — PROGRESS NOTES
"Chief Complaint  Follow-up (Hypertension) and Dizziness (X 1 week)    Subjective          Panda Henry presents to [facility]  The patient is here for follow-up on his hypertension, the patient states that the chlorthalidone made him pass out when he took a whole pill.  So now the patient is just taking a fourth of the pill once a day.  The patient also took on the amoxicillin that was given on his last visit but he still having some congestion.  He stated that the sinus infection is some better but not completely gone, and he is now having some dizziness whenever he moves his head.  The patient also needs some refills on his medications but he does not know which medications, he stated that he has a list at home but he forgot to bring it.    Hypertension  This is a recurrent problem. The current episode started more than 1 year ago. The problem is unchanged. The problem is uncontrolled. Pertinent negatives include no anxiety, blurred vision, chest pain, headaches, malaise/fatigue, neck pain, peripheral edema, PND, shortness of breath or sweats. Agents associated with hypertension include amphetamines. Risk factors for coronary artery disease include diabetes mellitus, dyslipidemia, family history, obesity, sedentary lifestyle and stress. There are no compliance problems.        Objective   Vital Signs:   /62 (BP Location: Right arm, Patient Position: Sitting, Cuff Size: Adult)   Pulse 87   Temp 98.3 °F (36.8 °C) (Temporal)   Ht 190.5 cm (75\")   Wt 107 kg (236 lb)   SpO2 99%   BMI 29.50 kg/m²     Physical Exam  Vitals and nursing note reviewed.   Constitutional:       General: He is not in acute distress.     Appearance: Normal appearance. He is well-developed and well-groomed.   HENT:      Head: Normocephalic and atraumatic.      Jaw: No tenderness or pain on movement.      Salivary Glands: Right salivary gland is not diffusely enlarged. Left salivary gland is not " diffusely enlarged.      Right Ear: Tympanic membrane and ear canal normal.      Left Ear: Tympanic membrane and ear canal normal.      Nose: No congestion or rhinorrhea.      Right Sinus: No maxillary sinus tenderness or frontal sinus tenderness.      Left Sinus: No maxillary sinus tenderness or frontal sinus tenderness.      Mouth/Throat:      Mouth: Mucous membranes are moist.      Pharynx: No oropharyngeal exudate or posterior oropharyngeal erythema.   Eyes:      General: Lids are normal. No allergic shiner or scleral icterus.     Conjunctiva/sclera: Conjunctivae normal.      Pupils: Pupils are equal, round, and reactive to light.   Neck:      Thyroid: No thyroid mass, thyromegaly or thyroid tenderness.      Trachea: Trachea normal.   Cardiovascular:      Rate and Rhythm: Normal rate and regular rhythm.  No extrasystoles are present.     Pulses: Normal pulses.      Heart sounds: Normal heart sounds. No murmur heard.     Pulmonary:      Effort: Pulmonary effort is normal. No respiratory distress.      Breath sounds: Normal breath sounds. No decreased breath sounds, wheezing, rhonchi or rales.   Chest:      Breasts:         Right: Normal.         Left: Normal.   Abdominal:      General: Bowel sounds are normal.      Palpations: Abdomen is soft.      Tenderness: There is no abdominal tenderness. There is no right CVA tenderness, left CVA tenderness, guarding or rebound.   Musculoskeletal:      Cervical back: Neck supple.      Right lower leg: No edema.      Left lower leg: No edema.   Lymphadenopathy:      Cervical: No cervical adenopathy.      Upper Body:      Right upper body: No supraclavicular or axillary adenopathy.      Left upper body: No supraclavicular or axillary adenopathy.   Skin:     General: Skin is warm.      Nails: There is no clubbing.   Neurological:      General: No focal deficit present.      Mental Status: He is alert and oriented to person, place, and time.      Sensory: Sensation is intact.       Motor: Motor function is intact.      Coordination: Coordination is intact.   Psychiatric:         Attention and Perception: Attention and perception normal.         Mood and Affect: Mood normal.         Speech: Speech normal.         Behavior: Behavior normal. Behavior is cooperative.         Thought Content: Thought content normal.        Result Review :                 Assessment and Plan    Diagnoses and all orders for this visit:    1. Essential hypertension (Primary)    2. Vertigo  -     hydrOXYzine pamoate (Vistaril) 50 MG capsule; Take 1 capsule by mouth 3 (Three) Times a Day As Needed for Allergies for up to 7 days.  Dispense: 21 capsule; Refill: 0        Follow Up   Return in about 3 months (around 11/11/2021) for Medicare Wellness.  Patient was given instructions and counseling regarding his condition or for health maintenance advice. Please see specific information pulled into the AVS if appropriate.     The patient was advised to hold off on the chlorthalidone and continue on all the other blood pressure medicine he is on now as prescribed.  We will give the patient some Vistaril 50 mg 1 p.o. 3 times daily as needed for dizziness.  The patient was advised to call if he is needing refills on any of his medications.  He was also counseled on the importance of diet, exercise and medication compliance.              This document has been electronically signed by Alberto Green MD  August 11, 2021 15:01 EDT                Answers for HPI/ROS submitted by the patient on 8/9/2021  What is the primary reason for your visit?: High Blood Pressure

## 2021-08-11 NOTE — PATIENT INSTRUCTIONS
Dizziness  Dizziness is a common problem. It makes you feel unsteady or light-headed. You may feel like you are about to pass out (faint). Dizziness can lead to getting hurt if you stumble or fall. Dizziness can be caused by many things, including:  · Medicines.  · Not having enough water in your body (dehydration).  · Illness.  Follow these instructions at home:  Eating and drinking    · Drink enough fluid to keep your pee (urine) clear or pale yellow. This helps to keep you from getting dehydrated. Try to drink more clear fluids, such as water.  · Do not drink alcohol.  · Limit how much caffeine you drink or eat, if your doctor tells you to do that.  · Limit how much salt (sodium) you drink or eat, if your doctor tells you to do that.  Activity    · Avoid making quick movements.  ? When you stand up from sitting in a chair, steady yourself until you feel okay.  ? In the morning, first sit up on the side of the bed. When you feel okay, stand slowly while you hold onto something. Do this until you know that your balance is fine.  · If you need to  one place for a long time, move your legs often. Tighten and relax the muscles in your legs while you are standing.  · Do not drive or use heavy machinery if you feel dizzy.  · Avoid bending down if you feel dizzy. Place items in your home so you can reach them easily without leaning over.  Lifestyle  · Do not use any products that contain nicotine or tobacco, such as cigarettes and e-cigarettes. If you need help quitting, ask your doctor.  · Try to lower your stress level. You can do this by using methods such as yoga or meditation. Talk with your doctor if you need help.  General instructions  · Watch your dizziness for any changes.  · Take over-the-counter and prescription medicines only as told by your doctor. Talk with your doctor if you think that you are dizzy because of a medicine that you are taking.  · Tell a friend or a family member that you are feeling  dizzy. If he or she notices any changes in your behavior, have this person call your doctor.  · Keep all follow-up visits as told by your doctor. This is important.  Contact a doctor if:  · Your dizziness does not go away.  · Your dizziness or light-headedness gets worse.  · You feel sick to your stomach (nauseous).  · You have trouble hearing.  · You have new symptoms.  · You are unsteady on your feet.  · You feel like the room is spinning.  Get help right away if:  · You throw up (vomit) or have watery poop (diarrhea), and you cannot eat or drink anything.  · You have trouble:  ? Talking.  ? Walking.  ? Swallowing.  ? Using your arms, hands, or legs.  · You feel generally weak.  · You are not thinking clearly, or you have trouble forming sentences. A friend or family member may notice this.  · You have:  ? Chest pain.  ? Pain in your belly (abdomen).  ? Shortness of breath.  ? Sweating.  · Your vision changes.  · You are bleeding.  · You have a very bad headache.  · You have neck pain or a stiff neck.  · You have a fever.  These symptoms may be an emergency. Do not wait to see if the symptoms will go away. Get medical help right away. Call your local emergency services (911 in the [place] Do not drive yourself to the hospital.  Summary  · Dizziness makes you feel unsteady or light-headed. You may feel like you are about to pass out (faint).  · Drink enough fluid to keep your pee (urine) clear or pale yellow. Do not drink alcohol.  · Avoid making quick movements if you feel dizzy.  · Watch your dizziness for any changes.  This information is not intended to replace advice given to you by your health care provider. Make sure you discuss any questions you have with your health care provider.  Document Revised: 12/21/2018 Document Reviewed: 01/04/2018  Elsevier Patient Education © 2021 [employer]    Hypertension, Adult  Hypertension is another name for high blood pressure. High blood pressure forces your heart to  work harder to pump blood. This can cause problems over time.  There are two numbers in a blood pressure reading. There is a top number (systolic) over a bottom number (diastolic). It is best to have a blood pressure that is below 120/80. Healthy choices can help lower your blood pressure, or you may need medicine to help lower it.  What are the causes?  The cause of this condition is not known. Some conditions may be related to high blood pressure.  What increases the risk?  · Smoking.  · Having type 2 diabetes mellitus, high cholesterol, or both.  · Not getting enough exercise or physical activity.  · Being overweight.  · Having too much fat, sugar, calories, or salt (sodium) in your diet.  · Drinking too much alcohol.  · Having long-term (chronic) kidney disease.  · Having a family history of high blood pressure.  · Age. Risk increases with age.  · Race. You may be at higher risk if you are .  · Gender. Men are at higher risk than women before age 45. After age 65, women are at higher risk than men.  · Having obstructive sleep apnea.  · Stress.  What are the signs or symptoms?  · High blood pressure may not cause symptoms. Very high blood pressure (hypertensive crisis) may cause:  ? Headache.  ? Feelings of worry or nervousness (anxiety).  ? Shortness of breath.  ? Nosebleed.  ? A feeling of being sick to your stomach (nausea).  ? Throwing up (vomiting).  ? Changes in how you see.  ? Very bad chest pain.  ? Seizures.  How is this treated?  · This condition is treated by making healthy lifestyle changes, such as:  ? Eating healthy foods.  ? Exercising more.  ? Drinking less alcohol.  · Your health care provider may prescribe medicine if lifestyle changes are not enough to get your blood pressure under control, and if:  ? Your top number is above 130.  ? Your bottom number is above 80.  · Your personal target blood pressure may vary.  Follow these instructions at home:  Eating and drinking    · If  told, follow the DASH eating plan. To follow this plan:  ? Fill one half of your plate at each meal with fruits and vegetables.  ? Fill one fourth of your plate at each meal with whole grains. Whole grains include whole-wheat pasta, brown rice, and whole-grain bread.  ? Eat or drink low-fat dairy products, such as skim milk or low-fat yogurt.  ? Fill one fourth of your plate at each meal with low-fat (lean) proteins. Low-fat proteins include fish, chicken without skin, eggs, beans, and tofu.  ? Avoid fatty meat, cured and processed meat, or chicken with skin.  ? Avoid pre-made or processed food.  · Eat less than 1,500 mg of salt each day.  · Do not drink alcohol if:  ? Your doctor tells you not to drink.  ? You are pregnant, may be pregnant, or are planning to become pregnant.  · If you drink alcohol:  ? Limit how much you use to:  § 0-1 drink a day for women.  § 0-2 drinks a day for men.  ? Be aware of how much alcohol is in your drink. In the [place] one drink equals one 12 oz bottle of beer (355 mL), one 5 oz glass of wine (148 mL), or one 1½ oz glass of hard liquor (44 mL).  Lifestyle    · Work with your doctor to stay at a healthy weight or to lose weight. Ask your doctor what the best weight is for you.  · Get at least 30 minutes of exercise most days of the week. This may include walking, swimming, or biking.  · Get at least 30 minutes of exercise that strengthens your muscles (resistance exercise) at least 3 days a week. This may include lifting weights or doing Pilates.  · Do not use any products that contain nicotine or tobacco, such as cigarettes, e-cigarettes, and chewing tobacco. If you need help quitting, ask your doctor.  · Check your blood pressure at home as told by your doctor.  · Keep all follow-up visits as told by your doctor. This is important.  Medicines  · Take over-the-counter and prescription medicines only as told by your doctor. Follow directions carefully.  · Do not skip doses of blood  pressure medicine. The medicine does not work as well if you skip doses. Skipping doses also puts you at risk for problems.  · Ask your doctor about side effects or reactions to medicines that you should watch for.  Contact a doctor if you:  · Think you are having a reaction to the medicine you are taking.  · Have headaches that keep coming back (recurring).  · Feel dizzy.  · Have swelling in your ankles.  · Have trouble with your vision.  Get help right away if you:  · Get a very bad headache.  · Start to feel mixed up (confused).  · Feel weak or numb.  · Feel faint.  · Have very bad pain in your:  ? Chest.  ? Belly (abdomen).  · Throw up more than once.  · Have trouble breathing.  Summary  · Hypertension is another name for high blood pressure.  · High blood pressure forces your heart to work harder to pump blood.  · For most people, a normal blood pressure is less than 120/80.  · Making healthy choices can help lower blood pressure. If your blood pressure does not get lower with healthy choices, you may need to take medicine.  This information is not intended to replace advice given to you by your health care provider. Make sure you discuss any questions you have with your health care provider.  Document Revised: 08/28/2019 Document Reviewed: 08/28/2019  Elsevier Patient Education © 2021 [employer] Inc.

## 2021-08-19 DIAGNOSIS — L30.9 ECZEMA, UNSPECIFIED TYPE: ICD-10-CM

## 2021-08-19 RX ORDER — HYDROXYZINE HYDROCHLORIDE 25 MG/1
TABLET, FILM COATED ORAL
Qty: 30 TABLET | Refills: 0 | OUTPATIENT
Start: 2021-08-19

## 2021-08-27 ENCOUNTER — TELEPHONE (OUTPATIENT)
Dept: FAMILY MEDICINE CLINIC | Facility: CLINIC | Age: 71
End: 2021-08-27

## 2021-08-27 RX ORDER — TAMSULOSIN HYDROCHLORIDE 0.4 MG/1
1 CAPSULE ORAL DAILY
Qty: 30 CAPSULE | Status: CANCELLED | OUTPATIENT
Start: 2021-08-27

## 2021-08-27 NOTE — TELEPHONE ENCOUNTER
Caller: ZELDA EVERETT    Relationship: Emergency Contact    Best call back number: [phone]     Medication needed:   Requested Prescriptions     Pending Prescriptions Disp Refills   • tamsulosin (FLOMAX) 0.4 MG capsule 24 hr capsule 30 capsule      Sig: Take 1 capsule by mouth Daily.       When do you need the refill by:08/27/21    What additional details did the patient provide when requesting the medication:     Does the patient have less than a 3 day supply:  [x] Yes  [] No    What is the patient's preferred pharmacy: [id number] - [place], [place] - [address] - [phone] [address] [phone] FX

## 2021-08-30 DIAGNOSIS — R42 VERTIGO: ICD-10-CM

## 2021-09-01 RX ORDER — HYDROXYZINE PAMOATE 50 MG/1
50 CAPSULE ORAL 3 TIMES DAILY PRN
Qty: 21 CAPSULE | Refills: 0 | Status: SHIPPED | OUTPATIENT
Start: 2021-09-01 | End: 2021-09-22 | Stop reason: SDUPTHER

## 2021-09-22 ENCOUNTER — OFFICE VISIT (OUTPATIENT)
Dept: FAMILY MEDICINE CLINIC | Facility: CLINIC | Age: 71
End: 2021-09-22

## 2021-09-22 VITALS
DIASTOLIC BLOOD PRESSURE: 84 MMHG | HEIGHT: 75 IN | WEIGHT: 236.8 LBS | HEART RATE: 66 BPM | OXYGEN SATURATION: 99 % | RESPIRATION RATE: 20 BRPM | TEMPERATURE: 96.8 F | SYSTOLIC BLOOD PRESSURE: 178 MMHG | BODY MASS INDEX: 29.44 KG/M2

## 2021-09-22 DIAGNOSIS — I10 ESSENTIAL HYPERTENSION: ICD-10-CM

## 2021-09-22 DIAGNOSIS — F33.0 MILD EPISODE OF RECURRENT MAJOR DEPRESSIVE DISORDER (HCC): ICD-10-CM

## 2021-09-22 DIAGNOSIS — E11.40 TYPE 2 DIABETES MELLITUS WITH DIABETIC NEUROPATHY, WITH LONG-TERM CURRENT USE OF INSULIN (HCC): ICD-10-CM

## 2021-09-22 DIAGNOSIS — K21.9 GASTROESOPHAGEAL REFLUX DISEASE WITHOUT ESOPHAGITIS: ICD-10-CM

## 2021-09-22 DIAGNOSIS — Z79.4 TYPE 2 DIABETES MELLITUS WITH DIABETIC NEUROPATHY, WITH LONG-TERM CURRENT USE OF INSULIN (HCC): ICD-10-CM

## 2021-09-22 DIAGNOSIS — E78.49 OTHER HYPERLIPIDEMIA: ICD-10-CM

## 2021-09-22 DIAGNOSIS — R42 VERTIGO: ICD-10-CM

## 2021-09-22 DIAGNOSIS — M19.012 OSTEOARTHRITIS OF LEFT SHOULDER, UNSPECIFIED OSTEOARTHRITIS TYPE: ICD-10-CM

## 2021-09-22 DIAGNOSIS — N40.0 BENIGN PROSTATIC HYPERPLASIA WITHOUT LOWER URINARY TRACT SYMPTOMS: ICD-10-CM

## 2021-09-22 DIAGNOSIS — I25.10 CORONARY ARTERY DISEASE INVOLVING NATIVE CORONARY ARTERY OF NATIVE HEART WITHOUT ANGINA PECTORIS: ICD-10-CM

## 2021-09-22 DIAGNOSIS — J44.9 CHRONIC OBSTRUCTIVE PULMONARY DISEASE, UNSPECIFIED COPD TYPE (HCC): ICD-10-CM

## 2021-09-22 DIAGNOSIS — I10 ESSENTIAL HYPERTENSION: Primary | ICD-10-CM

## 2021-09-22 PROBLEM — L30.9 ECZEMA: Status: ACTIVE | Noted: 2021-09-22

## 2021-09-22 PROCEDURE — 96372 THER/PROPH/DIAG INJ SC/IM: CPT | Performed by: FAMILY MEDICINE

## 2021-09-22 PROCEDURE — 96160 PT-FOCUSED HLTH RISK ASSMT: CPT | Performed by: FAMILY MEDICINE

## 2021-09-22 PROCEDURE — 1159F MED LIST DOCD IN RCRD: CPT | Performed by: FAMILY MEDICINE

## 2021-09-22 PROCEDURE — G0439 PPPS, SUBSEQ VISIT: HCPCS | Performed by: FAMILY MEDICINE

## 2021-09-22 PROCEDURE — 1125F AMNT PAIN NOTED PAIN PRSNT: CPT | Performed by: FAMILY MEDICINE

## 2021-09-22 RX ORDER — HYDROXYZINE PAMOATE 50 MG/1
50 CAPSULE ORAL 3 TIMES DAILY PRN
Qty: 21 CAPSULE | Refills: 0 | Status: SHIPPED | OUTPATIENT
Start: 2021-09-22 | End: 2022-05-26 | Stop reason: SDUPTHER

## 2021-09-22 RX ORDER — BUDESONIDE AND FORMOTEROL FUMARATE DIHYDRATE 160; 4.5 UG/1; UG/1
2 AEROSOL RESPIRATORY (INHALATION)
Status: CANCELLED | OUTPATIENT
Start: 2021-09-22

## 2021-09-22 RX ORDER — TAMSULOSIN HYDROCHLORIDE 0.4 MG/1
1 CAPSULE ORAL DAILY
Qty: 30 CAPSULE | Refills: 2 | Status: SHIPPED | OUTPATIENT
Start: 2021-09-22 | End: 2021-10-22

## 2021-09-22 RX ORDER — LOVASTATIN 40 MG/1
40 TABLET ORAL
Status: CANCELLED | OUTPATIENT
Start: 2021-09-22

## 2021-09-22 RX ORDER — CHLORTHALIDONE 25 MG/1
TABLET ORAL
COMMUNITY
Start: 2021-08-30 | End: 2021-09-22

## 2021-09-22 RX ORDER — BUDESONIDE AND FORMOTEROL FUMARATE DIHYDRATE 160; 4.5 UG/1; UG/1
2 AEROSOL RESPIRATORY (INHALATION)
Qty: 10.6 G | Refills: 2 | Status: SHIPPED | OUTPATIENT
Start: 2021-09-22 | End: 2022-12-19 | Stop reason: SDUPTHER

## 2021-09-22 RX ORDER — CLOPIDOGREL BISULFATE 75 MG/1
75 TABLET ORAL DAILY
Qty: 30 TABLET | Refills: 2 | Status: SHIPPED | OUTPATIENT
Start: 2021-09-22 | End: 2021-12-15

## 2021-09-22 RX ORDER — FINASTERIDE 5 MG/1
5 TABLET, FILM COATED ORAL DAILY
Qty: 30 TABLET | Refills: 2 | Status: SHIPPED | OUTPATIENT
Start: 2021-09-22 | End: 2021-10-22

## 2021-09-22 RX ORDER — DEXAMETHASONE SODIUM PHOSPHATE 4 MG/ML
8 INJECTION, SOLUTION INTRA-ARTICULAR; INTRALESIONAL; INTRAMUSCULAR; INTRAVENOUS; SOFT TISSUE ONCE
Status: COMPLETED | OUTPATIENT
Start: 2021-09-22 | End: 2021-09-22

## 2021-09-22 RX ORDER — METOPROLOL SUCCINATE 50 MG/1
50 TABLET, EXTENDED RELEASE ORAL DAILY
Qty: 30 TABLET | Refills: 0 | Status: CANCELLED | OUTPATIENT
Start: 2021-09-22 | End: 2021-10-22

## 2021-09-22 RX ORDER — CLOPIDOGREL BISULFATE 75 MG/1
75 TABLET ORAL DAILY
Qty: 30 TABLET | Status: CANCELLED | OUTPATIENT
Start: 2021-09-22

## 2021-09-22 RX ORDER — HYDROCHLOROTHIAZIDE 12.5 MG/1
12.5 TABLET ORAL DAILY
Qty: 30 TABLET | Refills: 2 | Status: SHIPPED | OUTPATIENT
Start: 2021-09-22 | End: 2021-12-13

## 2021-09-22 RX ORDER — PREDNISONE 10 MG/1
10 TABLET ORAL DAILY
Qty: 14 TABLET | Refills: 0 | Status: SHIPPED | OUTPATIENT
Start: 2021-09-22 | End: 2021-09-29

## 2021-09-22 RX ORDER — HYDROXYZINE PAMOATE 50 MG/1
50 CAPSULE ORAL 3 TIMES DAILY PRN
Qty: 21 CAPSULE | Refills: 0 | Status: CANCELLED | OUTPATIENT
Start: 2021-09-22 | End: 2021-09-29

## 2021-09-22 RX ORDER — LOVASTATIN 40 MG/1
40 TABLET ORAL NIGHTLY
Qty: 30 TABLET | Refills: 2 | Status: SHIPPED | OUTPATIENT
Start: 2021-09-22 | End: 2022-12-19 | Stop reason: SDUPTHER

## 2021-09-22 RX ORDER — METOPROLOL SUCCINATE 50 MG/1
50 TABLET, EXTENDED RELEASE ORAL DAILY
Qty: 30 TABLET | Refills: 2 | Status: SHIPPED | OUTPATIENT
Start: 2021-09-22 | End: 2021-12-13

## 2021-09-22 RX ADMIN — DEXAMETHASONE SODIUM PHOSPHATE 8 MG: 4 INJECTION, SOLUTION INTRA-ARTICULAR; INTRALESIONAL; INTRAMUSCULAR; INTRAVENOUS; SOFT TISSUE at 16:02

## 2021-09-22 NOTE — PROGRESS NOTES
The ABCs of the Annual Wellness Visit  Subsequent Medicare Wellness Visit    Chief Complaint   Patient presents with   • Shoulder Pain     LT side for several yrs. Sx have gotten worse in last mo.   • Elbow Pain     Lt. for several mo.      Subjective    History of Present Illness:  Panda Henry is a 71 y.o. male who presents for a Subsequent Medicare Wellness Visit, but his chronic left shoulder pain and elbow pain has been acting up in the last week.    The following portions of the patient's history were reviewed and   updated as appropriate: allergies, current medications, past family history, past medical history, past social history, past surgical history and problem list.    Compared to one year ago, the patient feels his physical   health is worse.    Compared to one year ago, the patient feels his mental   health is the same.    Recent Hospitalizations:  He was not admitted to the hospital during the last year.       Current Medical Providers:  Patient Care Team:  Alberto Green MD as PCP - General (Family Medicine)    Outpatient Medications Prior to Visit   Medication Sig Dispense Refill   • albuterol sulfate  (90 Base) MCG/ACT inhaler Inhale 2 puffs Every 4 (Four) Hours As Needed for Wheezing.     • amLODIPine (NORVASC) 10 MG tablet Take 10 mg by mouth Daily.     • aspirin 81 MG EC tablet Take 81 mg by mouth Daily.     • DICLOFENAC PO Take 50 mg by mouth. Pt takes twice daily.     • doxepin (SINEquan) 100 MG capsule Take 100 mg by mouth Every Night.     • escitalopram (LEXAPRO) 20 MG tablet Take 20 mg by mouth Daily.     • gabapentin (NEURONTIN) 300 MG capsule Take 300 mg by mouth 3 (Three) Times a Day.     • insulin aspart (novoLOG FLEXPEN) 100 UNIT/ML solution pen-injector sc pen Inject 18 Units under the skin into the appropriate area as directed Every Morning. 22 units in evening     • insulin glargine (LANTUS, SEMGLEE) 100 UNIT/ML injection Inject 50 Units under the skin into the  appropriate area as directed Daily.     • ketotifen (ZADITOR) 0.025 % ophthalmic solution 1 drop 2 (Two) Times a Day.     • lisinopril (PRINIVIL,ZESTRIL) 40 MG tablet Take 40 mg by mouth Daily.     • LORazepam (ATIVAN) 2 MG tablet Take 2 mg by mouth 3 (Three) Times a Day.     • montelukast (SINGULAIR) 10 MG tablet Take 10 mg by mouth Every Night.     • nitroglycerin (NITROSTAT) 0.4 MG SL tablet Place 0.4 mg under the tongue Every 5 (Five) Minutes As Needed for Chest Pain. Take no more than 3 doses in 15 minutes.     • omeprazole (priLOSEC) 40 MG capsule Take 40 mg by mouth Daily.     • simethicone (MYLICON) 80 MG chewable tablet Chew 80 mg Every 6 (Six) Hours As Needed for Flatulence.     • triamcinolone (KENALOG) 0.1 % cream Apply  topically to the appropriate area as directed 2 (Two) Times a Day.     • budesonide-formoterol (SYMBICORT) 160-4.5 MCG/ACT inhaler Inhale 2 puffs 2 (Two) Times a Day.     • clopidogrel (PLAVIX) 75 MG tablet Take 75 mg by mouth Daily.     • finasteride (PROSCAR) 5 MG tablet      • hydrOXYzine pamoate (Vistaril) 50 MG capsule Take 1 capsule by mouth 3 (Three) Times a Day As Needed for Allergies for up to 7 days. 21 capsule 0   • lovastatin (MEVACOR) 40 MG tablet Take 40 mg by mouth every night at bedtime.     • metoprolol succinate XL (Toprol XL) 50 MG 24 hr tablet Take 1 tablet by mouth Daily for 30 days. 30 tablet 0   • tamsulosin (FLOMAX) 0.4 MG capsule 24 hr capsule Take 1 capsule by mouth Daily.     • tadalafil (CIALIS) 20 MG tablet TAKE 1 TABLET BY MOUTH ONCE DAILY BEFORE SEX     • chlorthalidone (HYGROTON) 25 MG tablet        No facility-administered medications prior to visit.       No opioid medication identified on active medication list. I have reviewed chart for other potential  high risk medication/s and harmful drug interactions in the elderly.          Aspirin is on active medication list. Aspirin use is indicated based on review of current medical condition/s. Pros and cons  "of this therapy have been discussed today. Benefits of this medication outweigh potential harm.  Patient has been encouraged to continue taking this medication.  .      Patient Active Problem List   Diagnosis   • Generalized weakness   • Chest pain   • Hypertension   • Coronary artery disease   • Diabetes mellitus (CMS/HCC)   • Chronic back pain   • Hypokalemia   • BPH (benign prostatic hyperplasia)   • Anxiety   • Mild episode of recurrent major depressive disorder (CMS/HCC)   • Chronic obstructive pulmonary disease, unspecified (CMS/HCC)   • Chronic neck pain   • Gastroesophageal reflux disease without esophagitis   • Eczema   • Other hyperlipidemia     Advance Care Planning  Advance Directive is not on file.  ACP discussion was held with the patient during this visit. Patient does not have an advance directive, information provided.          Objective    Vitals:    09/22/21 1510   BP: 178/84   Pulse: 66   Resp: 20   Temp: 96.8 °F (36 °C)   TempSrc: Temporal   SpO2: 99%   Weight: 107 kg (236 lb 12.8 oz)   Height: 190.5 cm (75\")   PainSc:   8     BMI Readings from Last 1 Encounters:   09/22/21 29.60 kg/m²   BMI is above normal parameters. Recommendations include: educational material and exercise counseling    Does the patient have evidence of cognitive impairment? No    Physical Exam            HEALTH RISK ASSESSMENT    Smoking Status:  Social History     Tobacco Use   Smoking Status Never Smoker   Smokeless Tobacco Current User   • Types: Chew     Alcohol Consumption:  Social History     Substance and Sexual Activity   Alcohol Use Never    Comment: Drank some years ago     Fall Risk Screen:    PATEL Fall Risk Assessment was completed, and patient is at HIGH risk for falls. Assessment completed on:4/5/2021    Depression Screening:  PHQ-2/PHQ-9 Depression Screening 9/2/2021   Little interest or pleasure in doing things 3   Feeling down, depressed, or hopeless 2   Trouble falling or staying asleep, or sleeping too " much 2   Feeling tired or having little energy 3   Poor appetite or overeating 1   Feeling bad about yourself - or that you are a failure or have let yourself or your family down 0   Trouble concentrating on things, such as reading the newspaper or watching television 1   Moving or speaking so slowly that other people could have noticed. Or the opposite - being so fidgety or restless that you have been moving around a lot more than usual 1   Thoughts that you would be better off dead, or of hurting yourself in some way 0   Total Score 13   If you checked off any problems, how difficult have these problems made it for you to do your work, take care of things at home, or get along with other people? Somewhat difficult   Little interest or pleasure in doing things Nearly every day   Feeling down, depressed, or hopeless More than half the days   Trouble falling or staying asleep, or sleeping too much More than half the days   Feeling tired or having little energy Nearly every day   Poor appetite or overeating Several days   Feeling bad about yourself - or that you are a failure or have let yourself or your family down Not at all   Trouble concentrating on things, such as reading the newspaper or watching television Several days   Moving or speaking so slowly that other people could have noticed. Or the opposite - being so fidgety or restless that you have been moving around a lot more than usual Several days   Thoughts that you would be better off dead, or of hurting yourself in some way Not at all   How difficult have these problems made it for you to do your work, take care of things at home, or get along with other people? Somewhat difficult       Health Habits and Functional and Cognitive Screening:  No flowsheet data found.    Age-appropriate Screening Schedule:  Refer to the list below for future screening recommendations based on patient's age, sex and/or medical conditions. Orders for these recommended tests are  listed in the plan section. The patient has been provided with a written plan.    Health Maintenance   Topic Date Due   • LIPID PANEL  11/11/2021 (Originally 8/3/2021)   • ZOSTER VACCINE (1 of 2) 04/23/2022 (Originally 5/15/2000)   • INFLUENZA VACCINE  10/01/2021   • HEMOGLOBIN A1C  10/05/2021   • DIABETIC FOOT EXAM  04/05/2022   • URINE MICROALBUMIN  04/05/2022   • DIABETIC EYE EXAM  06/07/2022   • TDAP/TD VACCINES (2 - Td or Tdap) 05/11/2031              Assessment/Plan   CMS Preventative Services Quick Reference  Risk Factors Identified During Encounter  Cardiovascular Disease  Chronic Pain   Depression/Dysphoria  Fall Risk-High or Moderate  Immunizations Discussed/Encouraged (specific Immunizations; Tdap, Influenza, Pneumococcal 23, Shingrix and COVID19  Inactivity/Sedentary  Obesity/Overweight   Polypharmacy  The above risks/problems have been discussed with the patient.  Follow up actions/plans if indicated are seen below in the Assessment/Plan Section.  Pertinent information has been shared with the patient in the After Visit Summary.    Diagnoses and all orders for this visit:    1. Essential hypertension (Primary)  -     hydroCHLOROthiazide (HYDRODIURIL) 12.5 MG tablet; Take 1 tablet by mouth Daily for 30 days.  Dispense: 30 tablet; Refill: 2  -     metoprolol succinate XL (Toprol XL) 50 MG 24 hr tablet; Take 1 tablet by mouth Daily for 30 days.  Dispense: 30 tablet; Refill: 2    2. Coronary artery disease involving native coronary artery of native heart without angina pectoris  -     clopidogrel (PLAVIX) 75 MG tablet; Take 1 tablet by mouth Daily for 30 days.  Dispense: 30 tablet; Refill: 2    3. Gastroesophageal reflux disease without esophagitis    4. Type 2 diabetes mellitus with diabetic neuropathy, with long-term current use of insulin (CMS/[facility])  -     Cancel: Hemoglobin A1c  -     Hemoglobin A1c    5. Vertigo  -     hydrOXYzine pamoate (Vistaril) 50 MG capsule; Take 1 capsule by mouth 3 (Three) Times  a Day As Needed for Allergies for up to 7 days.  Dispense: 21 capsule; Refill: 0    6. Osteoarthritis of left shoulder, unspecified osteoarthritis type  -     dexamethasone (DECADRON) injection 8 mg  -     predniSONE (DELTASONE) 10 MG tablet; Take 1 tablet by mouth Daily for 7 days.  Dispense: 14 tablet; Refill: 0    7. Mild episode of recurrent major depressive disorder (CMS/HCC)  -     diclofenac (VOLTAREN) 50 MG EC tablet; Take 1 tablet by mouth 2 (Two) Times a Day for 30 days.  Dispense: 60 tablet; Refill: 2    8. Benign prostatic hyperplasia without lower urinary tract symptoms  -     finasteride (PROSCAR) 5 MG tablet; Take 1 tablet by mouth Daily for 30 days.  Dispense: 30 tablet; Refill: 2  -     tamsulosin (FLOMAX) 0.4 MG capsule 24 hr capsule; Take 1 capsule by mouth Daily for 30 days.  Dispense: 30 capsule; Refill: 2    9. Other hyperlipidemia    10. Chronic obstructive pulmonary disease, unspecified COPD type ([facility])  -     budesonide-formoterol (SYMBICORT) 160-4.5 MCG/ACT inhaler; Inhale 2 puffs 2 (Two) Times a Day for 30 days.  Dispense: 10.6 g; Refill: 2    Other orders  -     lovastatin (MEVACOR) 40 MG tablet; Take 1 tablet by mouth Every Night for 30 days.  Dispense: 30 tablet; Refill: 2        Follow Up:   Return in about 1 month (around 10/22/2021) for Recheck blood pressure.     An After Visit Summary and PPPS were made available to the patient.               The preventive exam has been reviewed in detail.  The patient has been fully counseled on preventative guidelines for vaccines, cancer screenings, and other health maintenance needs.   The patient has been counseled on guidelines for maintaining a lifestyle to promote good health and to minimize chronic diseases.  The patient has been assisted with scheduling these healthcare procedures for the coming year and given a written document of health maintenance and anticipatory guidance for age with the AVS.    The patient's medications will be  refilled as above.  We will add hydrochlorothiazide 12.5 mg 1 p.o. daily to his blood pressure medicines.  We will also check the patient's hemoglobin A1c today.  The patient will also be given Decadron 8 mg IM x1 and prednisone 10 mg 1 p.o. twice daily for his left shoulder pain.  The patient was counseled regarding the importance of diet, exercise and medication compliance.            This document has been electronically signed by Alberto Green MD  September 22, 2021 17:04 EDT

## 2021-09-23 LAB — HBA1C MFR BLD: 7.5 % (ref 4.8–5.6)

## 2021-10-26 ENCOUNTER — OFFICE VISIT (OUTPATIENT)
Dept: FAMILY MEDICINE CLINIC | Facility: CLINIC | Age: 71
End: 2021-10-26

## 2021-10-26 VITALS
DIASTOLIC BLOOD PRESSURE: 72 MMHG | HEIGHT: 75 IN | OXYGEN SATURATION: 97 % | BODY MASS INDEX: 29.02 KG/M2 | WEIGHT: 233.4 LBS | SYSTOLIC BLOOD PRESSURE: 138 MMHG | HEART RATE: 100 BPM | RESPIRATION RATE: 18 BRPM | TEMPERATURE: 97.7 F

## 2021-10-26 DIAGNOSIS — J01.10 ACUTE NON-RECURRENT FRONTAL SINUSITIS: Primary | ICD-10-CM

## 2021-10-26 DIAGNOSIS — Z23 ENCOUNTER FOR IMMUNIZATION: ICD-10-CM

## 2021-10-26 DIAGNOSIS — J01.00 ACUTE NON-RECURRENT MAXILLARY SINUSITIS: ICD-10-CM

## 2021-10-26 DIAGNOSIS — G89.29 CHRONIC NECK PAIN: ICD-10-CM

## 2021-10-26 DIAGNOSIS — M54.2 CHRONIC NECK PAIN: ICD-10-CM

## 2021-10-26 DIAGNOSIS — R42 DIZZINESS: ICD-10-CM

## 2021-10-26 DIAGNOSIS — I10 PRIMARY HYPERTENSION: ICD-10-CM

## 2021-10-26 PROCEDURE — 99213 OFFICE O/P EST LOW 20 MIN: CPT | Performed by: FAMILY MEDICINE

## 2021-10-26 PROCEDURE — 90662 IIV NO PRSV INCREASED AG IM: CPT | Performed by: FAMILY MEDICINE

## 2021-10-26 PROCEDURE — G0008 ADMIN INFLUENZA VIRUS VAC: HCPCS | Performed by: FAMILY MEDICINE

## 2021-10-26 RX ORDER — CEFPROZIL 500 MG/1
500 TABLET, FILM COATED ORAL 2 TIMES DAILY
Qty: 20 TABLET | Refills: 0 | Status: SHIPPED | OUTPATIENT
Start: 2021-10-26 | End: 2021-11-05

## 2021-10-26 RX ORDER — BISACODYL 5 MG
TABLET, DELAYED RELEASE (ENTERIC COATED) ORAL
Status: ON HOLD | COMMUNITY
Start: 2021-10-21 | End: 2022-11-30

## 2021-10-26 RX ORDER — CASTOR OIL
OIL (ML) ORAL
COMMUNITY
Start: 2021-10-21 | End: 2022-08-31

## 2021-10-26 RX ORDER — AMOXICILLIN AND CLAVULANATE POTASSIUM 500; 125 MG/1; MG/1
TABLET, FILM COATED ORAL
COMMUNITY
Start: 2021-09-16 | End: 2021-10-26

## 2021-10-26 RX ORDER — POLYETHYLENE GLYCOL 3350 17 G/DOSE
POWDER (GRAM) ORAL
COMMUNITY
Start: 2021-10-21 | End: 2022-02-01

## 2021-10-26 RX ORDER — PROMETHAZINE HYDROCHLORIDE 25 MG/1
TABLET ORAL
COMMUNITY
Start: 2021-10-21 | End: 2021-10-26

## 2021-10-26 RX ORDER — MECLIZINE HYDROCHLORIDE 25 MG/1
25 TABLET ORAL 3 TIMES DAILY PRN
Qty: 30 TABLET | Refills: 0 | Status: SHIPPED | OUTPATIENT
Start: 2021-10-26 | End: 2021-11-05

## 2021-10-26 RX ORDER — MELOXICAM 7.5 MG/1
TABLET ORAL
COMMUNITY
Start: 2021-09-28 | End: 2022-02-01

## 2021-10-26 NOTE — PROGRESS NOTES
"Chief Complaint  Follow-up (r/t HTN, pt reports having dizziness, and weakness in lower ext. off/on for several years. sx worse in last couple of wks)    Subjective          Panda Henry presents to [facility]  The patient is here for follow-up on his hypertension.  However he has been having nasal, postnasal drainage, weakness and dizziness in the last 3 weeks.  The patient denies any fever, chills, body aching or any exposure to anyone who tested positive for Covid.  The patient also denies any chest pains, shortness of breath, palpitations or any headaches.  The patient does not know which medications he needed refills on.      Objective   Vital Signs:   /72 (BP Location: Left arm, Patient Position: Sitting, Cuff Size: Adult)   Pulse 100   Temp 97.7 °F (36.5 °C) (Temporal)   Resp 18   Ht 190.5 cm (75\")   Wt 106 kg (233 lb 6.4 oz)   SpO2 97%   BMI 29.17 kg/m²     Physical Exam  Vitals and nursing note reviewed.   Constitutional:       General: He is not in acute distress.     Appearance: Normal appearance. He is well-developed and well-groomed.   HENT:      Head: Normocephalic and atraumatic.      Jaw: No tenderness or pain on movement.      Salivary Glands: Right salivary gland is not diffusely enlarged. Left salivary gland is not diffusely enlarged.      Right Ear: Tympanic membrane and ear canal normal.      Left Ear: Tympanic membrane and ear canal normal.      Nose: Congestion present. No rhinorrhea.      Right Sinus: Maxillary sinus tenderness and frontal sinus tenderness present.      Left Sinus: Maxillary sinus tenderness and frontal sinus tenderness present.      Mouth/Throat:      Mouth: Mucous membranes are moist.      Pharynx: No oropharyngeal exudate or posterior oropharyngeal erythema.   Eyes:      General: Lids are normal. No allergic shiner or scleral icterus.     Conjunctiva/sclera: Conjunctivae normal.      Pupils: Pupils are equal, round, and reactive " to light.   Neck:      Thyroid: No thyroid mass, thyromegaly or thyroid tenderness.      Trachea: Trachea normal.   Cardiovascular:      Rate and Rhythm: Normal rate and regular rhythm.  No extrasystoles are present.     Pulses: Normal pulses.      Heart sounds: Normal heart sounds. No murmur heard.      Pulmonary:      Effort: Pulmonary effort is normal. No respiratory distress.      Breath sounds: Normal breath sounds. No decreased breath sounds, wheezing, rhonchi or rales.   Chest:   Breasts:      Right: Normal. No axillary adenopathy or supraclavicular adenopathy.      Left: Normal. No axillary adenopathy or supraclavicular adenopathy.       Abdominal:      General: Bowel sounds are normal.      Palpations: Abdomen is soft.      Tenderness: There is no abdominal tenderness. There is no right CVA tenderness, left CVA tenderness, guarding or rebound.   Musculoskeletal:      Cervical back: Neck supple.      Right lower leg: No edema.      Left lower leg: No edema.   Lymphadenopathy:      Cervical: No cervical adenopathy.      Upper Body:      Right upper body: No supraclavicular or axillary adenopathy.      Left upper body: No supraclavicular or axillary adenopathy.   Skin:     General: Skin is warm.      Nails: There is no clubbing.   Neurological:      General: No focal deficit present.      Mental Status: He is alert and oriented to person, place, and time.      Sensory: Sensation is intact.      Motor: Motor function is intact.      Coordination: Coordination is intact.   Psychiatric:         Attention and Perception: Attention and perception normal.         Mood and Affect: Mood normal.         Speech: Speech normal.         Behavior: Behavior normal. Behavior is cooperative.         Thought Content: Thought content normal.        Result Review :                 Assessment and Plan    Diagnoses and all orders for this visit:    1. Acute non-recurrent frontal sinusitis (Primary)  -     cefprozil (CEFZIL) 500 MG  tablet; Take 1 tablet by mouth 2 (Two) Times a Day for 10 days.  Dispense: 20 tablet; Refill: 0    2. Acute non-recurrent maxillary sinusitis  -     cefprozil (CEFZIL) 500 MG tablet; Take 1 tablet by mouth 2 (Two) Times a Day for 10 days.  Dispense: 20 tablet; Refill: 0    3. Dizziness  -     meclizine (ANTIVERT) 25 MG tablet; Take 1 tablet by mouth 3 (Three) Times a Day As Needed for Dizziness for up to 10 days.  Dispense: 30 tablet; Refill: 0    4. Encounter for immunization  -     Cancel: FluLaval/Fluarix/Fluzone >6 Months ([id number])  -     Fluzone High-Dose 65+yrs    5. Primary hypertension    6. Chronic neck pain        Follow Up   Return in about 3 months (around 1/26/2022).  Patient was given instructions and counseling regarding his condition or for health maintenance advice. Please see specific information pulled into the AVS if appropriate.     The patient will be started on cefprozil 500 mg 1 p.o. twice daily, he will also be given meclizine 25 mg 1 p.o. 3 times daily as needed for the dizziness.  He was advised to continue his Zyrtec and Nasonex nasal spray as directed.  The patient was advised to rest, increase oral fluids and may take Tylenol or ibuprofen as needed.  The patient will also be given his flu vaccine today.  The patient was also advised to continue other regular medications as prescribed, he does not know which ones he needs refills on but he states he will call before he runs out of any medications.  The patient was advised rest, increase oral fluids and may take Tylenol or ibuprofen as needed.  He was also counseled on the importance of diet, exercise and medication compliance.                This document has been electronically signed by Alberto Green MD  October 26, 2021 17:30 EDT

## 2021-10-26 NOTE — PROGRESS NOTES
Pt tolerated inj without s/s or c/o of pain or discomfort. Pt instructed to wait 15 min after inj. Pt verbs understanding.

## 2021-12-06 ENCOUNTER — OFFICE VISIT (OUTPATIENT)
Dept: FAMILY MEDICINE CLINIC | Facility: CLINIC | Age: 71
End: 2021-12-06

## 2021-12-06 VITALS
SYSTOLIC BLOOD PRESSURE: 152 MMHG | TEMPERATURE: 98 F | HEIGHT: 75 IN | RESPIRATION RATE: 20 BRPM | HEART RATE: 102 BPM | OXYGEN SATURATION: 98 % | BODY MASS INDEX: 29.97 KG/M2 | WEIGHT: 241 LBS | DIASTOLIC BLOOD PRESSURE: 76 MMHG

## 2021-12-06 DIAGNOSIS — J01.91 ACUTE RECURRENT SINUSITIS, UNSPECIFIED LOCATION: Primary | ICD-10-CM

## 2021-12-06 PROCEDURE — 99213 OFFICE O/P EST LOW 20 MIN: CPT | Performed by: NURSE PRACTITIONER

## 2021-12-06 RX ORDER — AMOXICILLIN AND CLAVULANATE POTASSIUM 875; 125 MG/1; MG/1
1 TABLET, FILM COATED ORAL 2 TIMES DAILY
Qty: 20 TABLET | Refills: 0 | Status: SHIPPED | OUTPATIENT
Start: 2021-12-06 | End: 2022-02-01

## 2021-12-06 NOTE — PATIENT INSTRUCTIONS
Sinusitis, Adult  Sinusitis is inflammation of your sinuses. Sinuses are hollow spaces in the bones around your face. Your sinuses are located:  · Around your eyes.  · In the middle of your forehead.  · Behind your nose.  · In your cheekbones.  Mucus normally drains out of your sinuses. When your nasal tissues become inflamed or swollen, mucus can become trapped or blocked. This allows bacteria, viruses, and fungi to grow, which leads to infection. Most infections of the sinuses are caused by a virus.  Sinusitis can develop quickly. It can last for up to 4 weeks (acute) or for more than 12 weeks (chronic). Sinusitis often develops after a cold.  What are the causes?  This condition is caused by anything that creates swelling in the sinuses or stops mucus from draining. This includes:  · Allergies.  · Asthma.  · Infection from bacteria or viruses.  · Deformities or blockages in your nose or sinuses.  · Abnormal growths in the nose (nasal polyps).  · Pollutants, such as chemicals or irritants in the air.  · Infection from fungi (rare).  What increases the risk?  You are more likely to develop this condition if you:  · Have a weak body defense system (immune system).  · Do a lot of swimming or diving.  · Overuse nasal sprays.  · Smoke.  What are the signs or symptoms?  The main symptoms of this condition are pain and a feeling of pressure around the affected sinuses. Other symptoms include:  · Stuffy nose or congestion.  · Thick drainage from your nose.  · Swelling and warmth over the affected sinuses.  · Headache.  · Upper toothache.  · A cough that may get worse at night.  · Extra mucus that collects in the throat or the back of the nose (postnasal drip).  · Decreased sense of smell and taste.  · Fatigue.  · A fever.  · Sore throat.  · Bad breath.  How is this diagnosed?  This condition is diagnosed based on:  · Your symptoms.  · Your medical history.  · A physical exam.  · Tests to find out if your condition is  acute or chronic. This may include:  ? Checking your nose for nasal polyps.  ? Viewing your sinuses using a device that has a light (endoscope).  ? Testing for allergies or bacteria.  ? Imaging tests, such as an MRI or CT scan.  In rare cases, a bone biopsy may be done to rule out more serious types of fungal sinus disease.  How is this treated?  Treatment for sinusitis depends on the cause and whether your condition is chronic or acute.  · If caused by a virus, your symptoms should go away on their own within 10 days. You may be given medicines to relieve symptoms. They include:  ? Medicines that shrink swollen nasal passages (topical intranasal decongestants).  ? Medicines that treat allergies (antihistamines).  ? A spray that eases inflammation of the nostrils (topical intranasal corticosteroids).  ? Rinses that help get rid of thick mucus in your nose (nasal saline washes).  · If caused by bacteria, your health care provider may recommend waiting to see if your symptoms improve. Most bacterial infections will get better without antibiotic medicine. You may be given antibiotics if you have:  ? A severe infection.  ? A weak immune system.  · If caused by narrow nasal passages or nasal polyps, you may need to have surgery.  Follow these instructions at home:  Medicines  · Take, use, or apply over-the-counter and prescription medicines only as told by your health care provider. These may include nasal sprays.  · If you were prescribed an antibiotic medicine, take it as told by your health care provider. Do not stop taking the antibiotic even if you start to feel better.  Hydrate and humidify    · Drink enough fluid to keep your urine pale yellow. Staying hydrated will help to thin your mucus.  · Use a cool mist humidifier to keep the humidity level in your home above 50%.  · Inhale steam for 10-15 minutes, 3-4 times a day, or as told by your health care provider. You can do this in the bathroom while a hot shower is  running.  · Limit your exposure to cool or dry air.    Rest  · Rest as much as possible.  · Sleep with your head raised (elevated).  · Make sure you get enough sleep each night.  General instructions    · Apply a warm, moist washcloth to your face 3-4 times a day or as told by your health care provider. This will help with discomfort.  · Wash your hands often with soap and water to reduce your exposure to germs. If soap and water are not available, use hand .  · Do not smoke. Avoid being around people who are smoking (secondhand smoke).  · Keep all follow-up visits as told by your health care provider. This is important.    Contact a health care provider if:  · You have a fever.  · Your symptoms get worse.  · Your symptoms do not improve within 10 days.  Get help right away if:  · You have a severe headache.  · You have persistent vomiting.  · You have severe pain or swelling around your face or eyes.  · You have vision problems.  · You develop confusion.  · Your neck is stiff.  · You have trouble breathing.  Summary  · Sinusitis is soreness and inflammation of your sinuses. Sinuses are hollow spaces in the bones around your face.  · This condition is caused by nasal tissues that become inflamed or swollen. The swelling traps or blocks the flow of mucus. This allows bacteria, viruses, and fungi to grow, which leads to infection.  · If you were prescribed an antibiotic medicine, take it as told by your health care provider. Do not stop taking the antibiotic even if you start to feel better.  · Keep all follow-up visits as told by your health care provider. This is important.  This information is not intended to replace advice given to you by your health care provider. Make sure you discuss any questions you have with your health care provider.  Document Revised: 05/20/2019 Document Reviewed: 05/20/2019  [employer] Patient Education © 2021 [employer]

## 2021-12-06 NOTE — PROGRESS NOTES
"Chief Complaint  Sinusitis (for years) and Breathing Problem    Van Henry presents to [facility] for acute care (sinusitis).    Sinusitis  This is a recurrent problem. The current episode started more than 1 month ago (2 months). The problem has been waxing and waning since onset. There has been no fever. The pain is mild. Associated symptoms include congestion, ear pain, headaches, shortness of breath, sinus pressure and sneezing. Pertinent negatives include no chills, coughing, diaphoresis, hoarse voice, neck pain or swollen glands.     Objective   Vital Signs:   /76 (BP Location: Right arm, Patient Position: Sitting, Cuff Size: Adult)   Pulse 102   Temp 98 °F (36.7 °C) (Temporal)   Resp 20   Ht 190.5 cm (75\")   Wt 109 kg (241 lb)   SpO2 98%   BMI 30.12 kg/m²     Physical Exam  Vitals and nursing note reviewed.   Constitutional:       General: He is awake.      Appearance: Normal appearance.   HENT:      Head: Normocephalic.      Right Ear: Tympanic membrane normal. Decreased hearing noted. Swelling and tenderness present.      Left Ear: Tympanic membrane normal. Decreased hearing noted. Swelling and tenderness present.      Nose: Nasal tenderness and congestion present.      Right Sinus: Maxillary sinus tenderness and frontal sinus tenderness present.      Left Sinus: Maxillary sinus tenderness and frontal sinus tenderness present.      Mouth/Throat:      Lips: Pink.      Mouth: Mucous membranes are moist.      Pharynx: Oropharynx is clear.   Eyes:      General: Lids are normal.      Conjunctiva/sclera: Conjunctivae normal.      Pupils: Pupils are equal, round, and reactive to light.   Cardiovascular:      Rate and Rhythm: Normal rate and regular rhythm.      Pulses: Normal pulses.      Heart sounds: Normal heart sounds.   Pulmonary:      Effort: Pulmonary effort is normal.      Breath sounds: Normal breath sounds.   Abdominal:      General: " Abdomen is protuberant. Bowel sounds are normal.      Palpations: Abdomen is soft.      Tenderness: There is no abdominal tenderness.   Musculoskeletal:         General: Normal range of motion.      Cervical back: Normal range of motion.   Skin:     General: Skin is warm and dry.      Capillary Refill: Capillary refill takes less than 2 seconds.   Neurological:      Mental Status: He is alert and oriented to person, place, and time.      Sensory: Sensation is intact.      Motor: Motor function is intact.      Coordination: Coordination is intact.      Gait: Gait is intact.   Psychiatric:         Attention and Perception: Attention normal.         Mood and Affect: Mood and affect normal.         Speech: Speech normal.         Behavior: Behavior normal. Behavior is cooperative.         Thought Content: Thought content normal.        Result Review :   The following data was reviewed by: ZACKARY Mora on 12/06/2021:    Assessment and Plan    Diagnoses and all orders for this visit:    1. Acute recurrent sinusitis, unspecified location (Primary)  -     amoxicillin-clavulanate (Augmentin) 875-125 MG per tablet; Take 1 tablet by mouth 2 (Two) Times a Day.  Dispense: 20 tablet; Refill: 0    I spent 20 minutes caring for Panda on this date of service. This time includes time spent by me in the following activities:preparing for the visit, reviewing tests, obtaining and/or reviewing a separately obtained history, performing a medically appropriate examination and/or evaluation , counseling and educating the patient/family/caregiver, ordering medications, tests, or procedures, documenting information in the medical record and independently interpreting results and communicating that information with the patient/family/caregiver  Follow Up   Return if symptoms worsen or fail to improve.  Patient was given instructions and counseling regarding his condition or for health maintenance advice. Please see specific information  pulled into the AVS if appropriate.       This document has been electronically signed by ZACKARY Mora  December 6, 2021 14:54 EST

## 2021-12-13 DIAGNOSIS — I10 ESSENTIAL HYPERTENSION: ICD-10-CM

## 2021-12-13 RX ORDER — METOPROLOL SUCCINATE 50 MG/1
TABLET, EXTENDED RELEASE ORAL
Qty: 90 TABLET | Refills: 0 | Status: SHIPPED | OUTPATIENT
Start: 2021-12-13 | End: 2022-03-11

## 2021-12-13 RX ORDER — HYDROCHLOROTHIAZIDE 12.5 MG/1
TABLET ORAL
Qty: 90 TABLET | Refills: 0 | Status: SHIPPED | OUTPATIENT
Start: 2021-12-13 | End: 2022-03-11

## 2021-12-13 NOTE — TELEPHONE ENCOUNTER
Rx Refill Note  Requested Prescriptions     Pending Prescriptions Disp Refills   • hydroCHLOROthiazide (HYDRODIURIL) 12.5 MG tablet [Pharmacy Med Name: HYDROCHLOROTHIAZIDE 12.5 MG TB] 90 tablet      Sig: TAKE 1 TABLET BY MOUTH EVERY DAY   • metoprolol succinate XL (TOPROL-XL) 50 MG 24 hr tablet [Pharmacy Med Name: METOPROLOL SUCC ER 50 MG TAB] 90 tablet      Sig: TAKE 1 TABLET BY MOUTH EVERY DAY      Last office visit with prescribing clinician: 10/26/2021      Next office visit with prescribing clinician: 1/26/2022            Carol Gamez MA  12/13/21, 08:44 EST

## 2021-12-15 DIAGNOSIS — I25.10 CORONARY ARTERY DISEASE INVOLVING NATIVE CORONARY ARTERY OF NATIVE HEART WITHOUT ANGINA PECTORIS: ICD-10-CM

## 2021-12-15 RX ORDER — CLOPIDOGREL BISULFATE 75 MG/1
TABLET ORAL
Qty: 90 TABLET | Refills: 0 | Status: ON HOLD | OUTPATIENT
Start: 2021-12-15 | End: 2022-11-30 | Stop reason: SDUPTHER

## 2022-02-01 ENCOUNTER — OFFICE VISIT (OUTPATIENT)
Dept: FAMILY MEDICINE CLINIC | Facility: CLINIC | Age: 72
End: 2022-02-01

## 2022-02-01 VITALS
HEART RATE: 87 BPM | DIASTOLIC BLOOD PRESSURE: 70 MMHG | BODY MASS INDEX: 29.22 KG/M2 | WEIGHT: 235 LBS | SYSTOLIC BLOOD PRESSURE: 164 MMHG | OXYGEN SATURATION: 98 % | HEIGHT: 75 IN | TEMPERATURE: 98.6 F | RESPIRATION RATE: 20 BRPM

## 2022-02-01 DIAGNOSIS — J01.00 ACUTE NON-RECURRENT MAXILLARY SINUSITIS: ICD-10-CM

## 2022-02-01 DIAGNOSIS — J01.10 ACUTE NON-RECURRENT FRONTAL SINUSITIS: Primary | ICD-10-CM

## 2022-02-01 DIAGNOSIS — Z20.822 SUSPECTED COVID-19 VIRUS INFECTION: ICD-10-CM

## 2022-02-01 PROCEDURE — 99213 OFFICE O/P EST LOW 20 MIN: CPT | Performed by: FAMILY MEDICINE

## 2022-02-01 PROCEDURE — U0004 COV-19 TEST NON-CDC HGH THRU: HCPCS | Performed by: FAMILY MEDICINE

## 2022-02-01 RX ORDER — AMOXICILLIN AND CLAVULANATE POTASSIUM 875; 125 MG/1; MG/1
1 TABLET, FILM COATED ORAL 2 TIMES DAILY
Qty: 20 TABLET | Refills: 0 | Status: SHIPPED | OUTPATIENT
Start: 2022-02-01 | End: 2022-02-11

## 2022-02-01 RX ORDER — BENZONATATE 100 MG/1
100 CAPSULE ORAL 3 TIMES DAILY PRN
Qty: 30 CAPSULE | Refills: 0 | Status: SHIPPED | OUTPATIENT
Start: 2022-02-01 | End: 2022-02-11

## 2022-02-01 NOTE — PROGRESS NOTES
"Chief Complaint  Headache (ALL sx X 4 days), Sinus Problem, Nasal Congestion (pt reports bloody mucus when he blows his nose), and Fatigue    Subjective          Panda Henry presents to [facility]  The patient is here because of congestion and cough for 4 days. His drainage also has some blood at times, and he has a lot of facial pressure. The patient also has some body aches but no fever, chills or shortness of breath. His son and his family has been diagnosed with Covid but he claims that he has not been around them.      Objective   Vital Signs:   /70 (BP Location: Right arm, Patient Position: Sitting, Cuff Size: Adult)   Pulse 87   Temp 98.6 °F (37 °C) (Temporal)   Resp 20   Ht 190.5 cm (75\")   Wt 107 kg (235 lb)   SpO2 98%   BMI 29.37 kg/m²     Physical Exam  Vitals and nursing note reviewed.   Constitutional:       General: He is not in acute distress.     Appearance: Normal appearance. He is well-developed and well-groomed.   HENT:      Head: Normocephalic and atraumatic.      Jaw: No tenderness or pain on movement.      Salivary Glands: Right salivary gland is not diffusely enlarged. Left salivary gland is not diffusely enlarged.      Right Ear: Tympanic membrane and ear canal normal.      Left Ear: Tympanic membrane and ear canal normal.      Nose: Congestion present. No rhinorrhea.      Right Sinus: Maxillary sinus tenderness and frontal sinus tenderness present.      Left Sinus: Maxillary sinus tenderness and frontal sinus tenderness present.      Mouth/Throat:      Mouth: Mucous membranes are moist.      Pharynx: No oropharyngeal exudate or posterior oropharyngeal erythema.   Eyes:      General: Lids are normal. No allergic shiner or scleral icterus.     Conjunctiva/sclera: Conjunctivae normal.      Pupils: Pupils are equal, round, and reactive to light.   Neck:      Thyroid: No thyroid mass, thyromegaly or thyroid tenderness.      Trachea: Trachea normal. "   Cardiovascular:      Rate and Rhythm: Normal rate and regular rhythm.  No extrasystoles are present.     Pulses: Normal pulses.      Heart sounds: Normal heart sounds. No murmur heard.      Pulmonary:      Effort: Pulmonary effort is normal. No respiratory distress.      Breath sounds: Normal breath sounds. No decreased breath sounds, wheezing, rhonchi or rales.   Chest:   Breasts:      Right: Normal. No axillary adenopathy or supraclavicular adenopathy.      Left: Normal. No axillary adenopathy or supraclavicular adenopathy.       Abdominal:      General: Bowel sounds are normal.      Palpations: Abdomen is soft.      Tenderness: There is no abdominal tenderness. There is no right CVA tenderness, left CVA tenderness, guarding or rebound.   Musculoskeletal:      Cervical back: Neck supple.      Right lower leg: No edema.      Left lower leg: No edema.   Lymphadenopathy:      Cervical: No cervical adenopathy.      Upper Body:      Right upper body: No supraclavicular or axillary adenopathy.      Left upper body: No supraclavicular or axillary adenopathy.   Skin:     General: Skin is warm.      Nails: There is no clubbing.   Neurological:      General: No focal deficit present.      Mental Status: He is alert and oriented to person, place, and time.      Sensory: Sensation is intact.      Motor: Motor function is intact.      Coordination: Coordination is intact.   Psychiatric:         Attention and Perception: Attention and perception normal.         Mood and Affect: Mood normal.         Speech: Speech normal.         Behavior: Behavior normal. Behavior is cooperative.         Thought Content: Thought content normal.        Result Review :                 Assessment and Plan    Diagnoses and all orders for this visit:    1. Acute non-recurrent frontal sinusitis (Primary)  -     amoxicillin-clavulanate (Augmentin) 875-125 MG per tablet; Take 1 tablet by mouth 2 (Two) Times a Day for 10 days.  Dispense: 20 tablet;  Refill: 0  -     benzonatate (Tessalon Perles) 100 MG capsule; Take 1 capsule by mouth 3 (Three) Times a Day As Needed for Cough for up to 10 days.  Dispense: 30 capsule; Refill: 0    2. Acute non-recurrent maxillary sinusitis  -     amoxicillin-clavulanate (Augmentin) 875-125 MG per tablet; Take 1 tablet by mouth 2 (Two) Times a Day for 10 days.  Dispense: 20 tablet; Refill: 0  -     benzonatate (Tessalon Perles) 100 MG capsule; Take 1 capsule by mouth 3 (Three) Times a Day As Needed for Cough for up to 10 days.  Dispense: 30 capsule; Refill: 0    3. Suspected COVID-19 virus infection  -     COVID-19, [facility] IN-HOUSE NP/OP SWAB IN UTM/VTM/[facility] TRANSPORT MEDIA 24HR TAT - Swab, Nasopharynx        Follow Up   Return if symptoms worsen or fail to improve.  Patient was given instructions and counseling regarding his condition or for health maintenance advice. Please see specific information pulled into the AVS if appropriate.     The patient was advised rest, increase oral fluids, may take Tylenol or ibuprofen as needed.  The patient will be started on Augmentin 875 mg 1 p.o. twice daily.  Advised to continue Zyrtec at home and will add Tessalon Perles 100 mg 1 p.o. 3 times daily.  We will do a Covid test on the patient and will call him with the result.    Testing for coronavirus will return in 2-3 days.  Quarantine instructions provided.  Supportive treatment and counseling provided.  Instructions on when to go to the emergency room provided, including shortness of breath, worsening condition, etc.            This document has been electronically signed by Alberto Green MD  February 1, 2022 16:04 EST

## 2022-02-02 LAB — SARS-COV-2 RNA PNL SPEC NAA+PROBE: NOT DETECTED

## 2022-03-11 DIAGNOSIS — I10 ESSENTIAL HYPERTENSION: ICD-10-CM

## 2022-03-11 RX ORDER — HYDROCHLOROTHIAZIDE 12.5 MG/1
TABLET ORAL
Qty: 90 TABLET | Refills: 0 | Status: SHIPPED | OUTPATIENT
Start: 2022-03-11 | End: 2022-05-26

## 2022-03-11 RX ORDER — METOPROLOL SUCCINATE 50 MG/1
TABLET, EXTENDED RELEASE ORAL
Qty: 90 TABLET | Refills: 0 | Status: SHIPPED | OUTPATIENT
Start: 2022-03-11 | End: 2022-08-26 | Stop reason: ALTCHOICE

## 2022-03-11 NOTE — TELEPHONE ENCOUNTER
Rx Refill Note  Requested Prescriptions     Pending Prescriptions Disp Refills   • metoprolol succinate XL (TOPROL-XL) 50 MG 24 hr tablet [Pharmacy Med Name: METOPROLOL SUCC ER 50 MG TAB] 90 tablet 0     Sig: TAKE 1 TABLET BY MOUTH EVERY DAY   • hydroCHLOROthiazide (HYDRODIURIL) 12.5 MG tablet [Pharmacy Med Name: HYDROCHLOROTHIAZIDE 12.5 MG TB] 90 tablet 0     Sig: TAKE 1 TABLET BY MOUTH EVERY DAY     Please see above med refill request. Both ordered on 12/13/21 for 90 pills each, no refills          Last office visit with prescribing clinician: 2/1/2022      Next office visit with prescribing clinician: Visit date not found   {TIP  Encounters:23}         Kaylah Levine RN  03/11/22, 08:35 EST

## 2022-05-26 ENCOUNTER — OFFICE VISIT (OUTPATIENT)
Dept: FAMILY MEDICINE CLINIC | Facility: CLINIC | Age: 72
End: 2022-05-26

## 2022-05-26 VITALS
DIASTOLIC BLOOD PRESSURE: 81 MMHG | WEIGHT: 229 LBS | HEART RATE: 68 BPM | BODY MASS INDEX: 28.47 KG/M2 | RESPIRATION RATE: 20 BRPM | HEIGHT: 75 IN | SYSTOLIC BLOOD PRESSURE: 150 MMHG | OXYGEN SATURATION: 99 %

## 2022-05-26 DIAGNOSIS — L30.9 ACUTE DERMATITIS: ICD-10-CM

## 2022-05-26 DIAGNOSIS — I10 PRIMARY HYPERTENSION: Primary | ICD-10-CM

## 2022-05-26 DIAGNOSIS — I25.10 CORONARY ARTERY DISEASE INVOLVING NATIVE CORONARY ARTERY OF NATIVE HEART WITHOUT ANGINA PECTORIS: ICD-10-CM

## 2022-05-26 DIAGNOSIS — F41.9 ANXIETY: ICD-10-CM

## 2022-05-26 DIAGNOSIS — E78.49 OTHER HYPERLIPIDEMIA: ICD-10-CM

## 2022-05-26 PROCEDURE — 99213 OFFICE O/P EST LOW 20 MIN: CPT | Performed by: FAMILY MEDICINE

## 2022-05-26 RX ORDER — GABAPENTIN 300 MG/1
300 CAPSULE ORAL DAILY
COMMUNITY
Start: 2022-04-23

## 2022-05-26 RX ORDER — HYDROXYZINE PAMOATE 50 MG/1
50 CAPSULE ORAL 3 TIMES DAILY PRN
Qty: 21 CAPSULE | Refills: 0 | Status: SHIPPED | OUTPATIENT
Start: 2022-05-26 | End: 2022-07-25 | Stop reason: SDUPTHER

## 2022-05-26 RX ORDER — DICLOFENAC SODIUM 75 MG/1
75 TABLET, DELAYED RELEASE ORAL 2 TIMES DAILY
COMMUNITY
Start: 2022-02-18 | End: 2022-07-25 | Stop reason: SDUPTHER

## 2022-05-26 NOTE — PROGRESS NOTES
"Chief Complaint  Follow-up (Follow up-Patient had CT C Spine WO on 04/01/2022 at [facility]. The CT shows an incidental finding the patient would like to go over this result.) and Hypertension (Patient states since starting Nefedipine 30mg on 05/02/2022 he has been experiencing dizziness and blurred vision. He has been having these symptoms for approximately 1 week./Patient brought a blood pressure log with him today.)    Van Henry presents to [facility]  The patient is a 72-year-old male who is here for follow-up, he has hypertension, hyperlipidemia, CAD, anxiety and diabetes.  [facility] and he had his annual physical and preventative labs.  The patient states that his hemoglobin A1c was down to 6.8 but he forgot to bring the copies of the [facility] that [facility].  Patient also stated that [facility] stopped his amlodipine and gave him nifedipine but since he started taking the nifedipine, he is having dizziness and lightheadedness.  The patient is now taking nifedipine, lisinopril and metoprolol for his blood pressure, and his amlodipine and hydrochlorothiazide has been discontinued.  The patient also have a rash and itching on the left side of his trunk that has been going on for about a week, he has been applying cortisone cream but is wanting some hydroxyzine to help him with the itching especially at nighttime.      Objective   Vital Signs:  /81 (BP Location: Right arm, Patient Position: Sitting, Cuff Size: Adult)   Pulse 68   Resp 20   Ht 190.5 cm (75\")   Wt 104 kg (229 lb)   SpO2 99%   BMI 28.62 kg/m²         Physical Exam  Vitals and nursing note reviewed.   Constitutional:       General: He is not in acute distress.     Appearance: Normal appearance. He is well-developed and well-groomed.   HENT:      Head: Normocephalic and atraumatic.      Jaw: No tenderness or pain on movement.    "   Salivary Glands: Right salivary gland is not diffusely enlarged. Left salivary gland is not diffusely enlarged.      Right Ear: Tympanic membrane and ear canal normal.      Left Ear: Tympanic membrane and ear canal normal.      Nose: No congestion or rhinorrhea.      Mouth/Throat:      Mouth: Mucous membranes are moist.      Pharynx: No oropharyngeal exudate or posterior oropharyngeal erythema.   Eyes:      General: Lids are normal. No allergic shiner or scleral icterus.     Conjunctiva/sclera: Conjunctivae normal.      Pupils: Pupils are equal, round, and reactive to light.   Neck:      Thyroid: No thyroid mass, thyromegaly or thyroid tenderness.      Trachea: Trachea normal.   Cardiovascular:      Rate and Rhythm: Normal rate and regular rhythm.  No extrasystoles are present.     Pulses: Normal pulses.      Heart sounds: Normal heart sounds. No murmur heard.  Pulmonary:      Effort: Pulmonary effort is normal. No respiratory distress.      Breath sounds: Normal breath sounds. No decreased breath sounds, wheezing, rhonchi or rales.   Chest:   Breasts:      Right: Normal. No axillary adenopathy or supraclavicular adenopathy.      Left: Normal. No axillary adenopathy or supraclavicular adenopathy.       Abdominal:      General: Bowel sounds are normal.      Palpations: Abdomen is soft.      Tenderness: There is no abdominal tenderness. There is no right CVA tenderness, left CVA tenderness, guarding or rebound.   Musculoskeletal:      Cervical back: Neck supple.      Right lower leg: No edema.      Left lower leg: No edema.      Right foot: Normal range of motion. No deformity or foot drop.      Left foot: Normal range of motion. No deformity or foot drop.   Feet:      Right foot:      Protective Sensation: 10 sites tested. 6 sites sensed.      Skin integrity: No ulcer, blister, skin breakdown or erythema.      Toenail Condition: Right toenails are normal.      Left foot:      Protective Sensation: 10 sites tested.  5 sites sensed.      Skin integrity: No ulcer, blister, skin breakdown or erythema.      Toenail Condition: Left toenails are normal.   Lymphadenopathy:      Cervical: No cervical adenopathy.      Upper Body:      Right upper body: No supraclavicular or axillary adenopathy.      Left upper body: No supraclavicular or axillary adenopathy.   Skin:     General: Skin is warm.      Nails: There is no clubbing.   Neurological:      General: No focal deficit present.      Mental Status: He is alert and oriented to person, place, and time.      Sensory: Sensation is intact.      Motor: Motor function is intact.      Coordination: Coordination is intact.   Psychiatric:         Attention and Perception: Attention and perception normal.         Mood and Affect: Mood normal.         Speech: Speech normal.         Behavior: Behavior normal. Behavior is cooperative.         Thought Content: Thought content normal.        Result Review :                 Assessment and Plan    Diagnoses and all orders for this visit:    1. Acute dermatitis (Primary)  -     hydrOXYzine pamoate (Vistaril) 50 MG capsule; Take 1 capsule by mouth 3 (Three) Times a Day As Needed for Allergies for up to 7 days.  Dispense: 21 capsule; Refill: 0    2. Vertigo  -     hydrOXYzine pamoate (Vistaril) 50 MG capsule; Take 1 capsule by mouth 3 (Three) Times a Day As Needed for Allergies for up to 7 days.  Dispense: 21 capsule; Refill: 0    3. Primary hypertension    4. Other hyperlipidemia    5. Anxiety    6. Coronary artery disease involving native coronary artery of native heart without angina pectoris             Follow Up   Return in about 3 months (around 8/26/2022).  Patient was given instructions and counseling regarding his condition or for health maintenance advice. Please see specific information pulled into the AVS if appropriate.     The patient was advised to take half of the nifedipine for 1 week and then go back to his regular dose.  He was also  advised to continue all his other medications as prescribed.  His amlodipine and hydrochlorothiazide has been discontinued.  The patient was advised to return to the clinic if the dizziness and lightheadedness will persist.  The patient will be given hydroxyzine 50 mg to take at night as needed for itching.  He was also advised to continue using the cortisone cream for the rash.        This document has been electronically signed by Alberto Green MD  May 26, 2022 13:25 EDT

## 2022-05-27 ENCOUNTER — TELEPHONE (OUTPATIENT)
Dept: PREADMISSION TESTING | Facility: HOSPITAL | Age: 72
End: 2022-05-27

## 2022-05-31 ENCOUNTER — APPOINTMENT (OUTPATIENT)
Dept: PREADMISSION TESTING | Facility: HOSPITAL | Age: 72
End: 2022-05-31

## 2022-06-16 ENCOUNTER — OFFICE VISIT (OUTPATIENT)
Dept: FAMILY MEDICINE CLINIC | Facility: CLINIC | Age: 72
End: 2022-06-16

## 2022-06-16 VITALS
OXYGEN SATURATION: 99 % | RESPIRATION RATE: 20 BRPM | SYSTOLIC BLOOD PRESSURE: 164 MMHG | WEIGHT: 229.2 LBS | BODY MASS INDEX: 28.5 KG/M2 | DIASTOLIC BLOOD PRESSURE: 76 MMHG | HEIGHT: 75 IN | TEMPERATURE: 96.9 F | HEART RATE: 71 BPM

## 2022-06-16 DIAGNOSIS — J01.10 ACUTE NON-RECURRENT FRONTAL SINUSITIS: Primary | ICD-10-CM

## 2022-06-16 DIAGNOSIS — M25.561 CHRONIC PAIN OF BOTH KNEES: ICD-10-CM

## 2022-06-16 DIAGNOSIS — R42 DIZZINESS: ICD-10-CM

## 2022-06-16 DIAGNOSIS — R91.1 NODULE OF RIGHT LUNG: ICD-10-CM

## 2022-06-16 DIAGNOSIS — G89.29 CHRONIC PAIN OF BOTH KNEES: ICD-10-CM

## 2022-06-16 DIAGNOSIS — M25.562 CHRONIC PAIN OF BOTH KNEES: ICD-10-CM

## 2022-06-16 PROCEDURE — 99214 OFFICE O/P EST MOD 30 MIN: CPT | Performed by: FAMILY MEDICINE

## 2022-06-16 RX ORDER — EZETIMIBE 10 MG/1
10 TABLET ORAL DAILY
COMMUNITY

## 2022-06-16 RX ORDER — MECLIZINE HYDROCHLORIDE 25 MG/1
25 TABLET ORAL 3 TIMES DAILY PRN
Qty: 15 TABLET | Refills: 0 | Status: SHIPPED | OUTPATIENT
Start: 2022-06-16 | End: 2022-06-21

## 2022-06-16 RX ORDER — TRAMADOL HYDROCHLORIDE 50 MG/1
50 TABLET ORAL EVERY 6 HOURS PRN
COMMUNITY
Start: 2022-05-31 | End: 2023-02-28

## 2022-06-16 RX ORDER — LOSARTAN POTASSIUM 50 MG/1
50 TABLET ORAL 2 TIMES DAILY
COMMUNITY
End: 2022-10-10 | Stop reason: ALTCHOICE

## 2022-06-16 RX ORDER — HYDROCHLOROTHIAZIDE 12.5 MG/1
12.5 CAPSULE, GELATIN COATED ORAL DAILY
COMMUNITY
End: 2022-08-26 | Stop reason: ALTCHOICE

## 2022-06-16 RX ORDER — AMOXICILLIN 875 MG/1
875 TABLET, COATED ORAL 2 TIMES DAILY
Qty: 20 TABLET | Refills: 0 | Status: SHIPPED | OUTPATIENT
Start: 2022-06-16 | End: 2022-06-26

## 2022-06-17 LAB
BASOPHILS # BLD AUTO: 0.07 10*3/MM3 (ref 0–0.2)
BASOPHILS NFR BLD AUTO: 0.9 % (ref 0–1.5)
EOSINOPHIL # BLD AUTO: 0.25 10*3/MM3 (ref 0–0.4)
EOSINOPHIL NFR BLD AUTO: 3.1 % (ref 0.3–6.2)
ERYTHROCYTE [DISTWIDTH] IN BLOOD BY AUTOMATED COUNT: 13.2 % (ref 12.3–15.4)
HCT VFR BLD AUTO: 49.4 % (ref 37.5–51)
HGB BLD-MCNC: 16.2 G/DL (ref 13–17.7)
IMM GRANULOCYTES # BLD AUTO: 0.02 10*3/MM3 (ref 0–0.05)
IMM GRANULOCYTES NFR BLD AUTO: 0.2 % (ref 0–0.5)
LYMPHOCYTES # BLD AUTO: 1.17 10*3/MM3 (ref 0.7–3.1)
LYMPHOCYTES NFR BLD AUTO: 14.4 % (ref 19.6–45.3)
MCH RBC QN AUTO: 29 PG (ref 26.6–33)
MCHC RBC AUTO-ENTMCNC: 32.8 G/DL (ref 31.5–35.7)
MCV RBC AUTO: 88.5 FL (ref 79–97)
MONOCYTES # BLD AUTO: 0.64 10*3/MM3 (ref 0.1–0.9)
MONOCYTES NFR BLD AUTO: 7.9 % (ref 5–12)
NEUTROPHILS # BLD AUTO: 5.98 10*3/MM3 (ref 1.7–7)
NEUTROPHILS NFR BLD AUTO: 73.5 % (ref 42.7–76)
NRBC BLD AUTO-RTO: 0 /100 WBC (ref 0–0.2)
PLATELET # BLD AUTO: 254 10*3/MM3 (ref 140–450)
RBC # BLD AUTO: 5.58 10*6/MM3 (ref 4.14–5.8)
WBC # BLD AUTO: 8.13 10*3/MM3 (ref 3.4–10.8)

## 2022-06-17 NOTE — PROGRESS NOTES
"Chief Complaint  Fatigue (Chronic but worse in last 2-3 days), Dizziness (Worse in last 2-3 days), Hypertension (Top number (181/74), >200recently), and Legs wobbley    Subjective        Pandamary Henry presents to [facility]  The patient is a 72-year-old male who is here today for follow-up, the patient states that he just recently went to the [facility] and they change his medications for his blood pressure.  He stated that they stopped his lisinopril and he was started on losartan and hydrochlorothiazide, his blood pressure is still running high at home but today is some better.  The patient is also having nasal and postnasal drainage for 2 weeks and in the last couple of days he has been having some dizziness especially when he moves his head and when he gets up from bed.  The patient is also wanting a referral to an orthopedic doctor here in [place] because of his chronic bilateral knee pain.  The patient also has some blood [facility]  The patient is also worried about the right lung nodule that was seen incidentally on the CT of the neck.      Objective   Vital Signs:  /76 (BP Location: Left arm, Patient Position: Sitting, Cuff Size: Adult)   Pulse 71   Temp 96.9 °F (36.1 °C) (Temporal)   Resp 20   Ht 190.5 cm (75\")   Wt 104 kg (229 lb 3.2 oz)   SpO2 99%   BMI 28.65 kg/m²   Estimated body mass index is 28.65 kg/m² as calculated from the following:    Height as of this encounter: 190.5 cm (75\").    Weight as of this encounter: 104 kg (229 lb 3.2 oz).          Physical Exam  Vitals and nursing note reviewed.   Constitutional:       General: He is not in acute distress.     Appearance: Normal appearance. He is well-developed and well-groomed.   HENT:      Head: Normocephalic and atraumatic.      Jaw: No tenderness or pain on movement.      Salivary Glands: Right salivary gland is not diffusely enlarged. Left salivary gland is not " diffusely enlarged.      Right Ear: Tympanic membrane and ear canal normal.      Left Ear: Tympanic membrane and ear canal normal.      Nose: No congestion or rhinorrhea.      Mouth/Throat:      Mouth: Mucous membranes are moist.      Pharynx: No oropharyngeal exudate or posterior oropharyngeal erythema.   Eyes:      General: Lids are normal. No allergic shiner or scleral icterus.     Conjunctiva/sclera: Conjunctivae normal.      Pupils: Pupils are equal, round, and reactive to light.   Neck:      Thyroid: No thyroid mass, thyromegaly or thyroid tenderness.      Trachea: Trachea normal.   Cardiovascular:      Rate and Rhythm: Normal rate and regular rhythm.  No extrasystoles are present.     Pulses: Normal pulses.      Heart sounds: Normal heart sounds. No murmur heard.  Pulmonary:      Effort: Pulmonary effort is normal. No respiratory distress.      Breath sounds: Normal breath sounds. No decreased breath sounds, wheezing, rhonchi or rales.   Chest:   Breasts:      Right: Normal. No axillary adenopathy or supraclavicular adenopathy.      Left: Normal. No axillary adenopathy or supraclavicular adenopathy.       Abdominal:      General: Bowel sounds are normal.      Palpations: Abdomen is soft.      Tenderness: There is no abdominal tenderness. There is no right CVA tenderness, left CVA tenderness, guarding or rebound.   Musculoskeletal:      Cervical back: Neck supple.      Right knee: Tenderness present over the medial joint line and lateral joint line.      Left knee: Tenderness present over the medial joint line and lateral joint line.      Right lower leg: No edema.      Left lower leg: No edema.   Lymphadenopathy:      Cervical: No cervical adenopathy.      Upper Body:      Right upper body: No supraclavicular or axillary adenopathy.      Left upper body: No supraclavicular or axillary adenopathy.   Skin:     General: Skin is warm.      Nails: There is no clubbing.   Neurological:      General: No focal  deficit present.      Mental Status: He is alert and oriented to person, place, and time.      Sensory: Sensation is intact.      Motor: Motor function is intact.      Coordination: Coordination is intact.   Psychiatric:         Attention and Perception: Attention and perception normal.         Mood and Affect: Mood normal.         Speech: Speech normal.         Behavior: Behavior normal. Behavior is cooperative.         Thought Content: Thought content normal.        Result Review :                Assessment and Plan   Diagnoses and all orders for this visit:    1. Acute non-recurrent frontal sinusitis (Primary)  -     amoxicillin (AMOXIL) 875 MG tablet; Take 1 tablet by mouth 2 (Two) Times a Day for 10 days.  Dispense: 20 tablet; Refill: 0    2. Nodule of right lung  -     CT Chest Without Contrast; Future    3. Dizziness  -     CBC & Differential  -     meclizine (ANTIVERT) 25 MG tablet; Take 1 tablet by mouth 3 (Three) Times a Day As Needed for Dizziness for up to 5 days.  Dispense: 15 tablet; Refill: 0    4. Chronic pain of both knees  -     Ambulatory Referral to Orthopedic Surgery             Follow Up   Return if symptoms worsen or fail to improve.  Patient was given instructions and counseling regarding his condition or for health maintenance advice. Please see specific information pulled into the AVS if appropriate.     The patient will be started on amoxicillin 875 mg 1 p.o. twice daily and will give him meclizine 25 mg 1 p.o. 3 times daily as needed for the dizziness.  The patient was advised rest, increase oral fluids and may take Tylenol or ibuprofen as needed.  The patient will also be referred to the orthopedic surgeon here in [place] for reevaluation of his bilateral knee pain.  We will also refer the patient to get a CT of the chest to reevaluate the right lung nodule.  The patient was also advised to continue all his regular medications as prescribed.          This document has been  electronically signed by Alberto Green MD  June 17, 2022 11:57 EDT

## 2022-07-06 ENCOUNTER — APPOINTMENT (OUTPATIENT)
Dept: PREADMISSION TESTING | Facility: HOSPITAL | Age: 72
End: 2022-07-06

## 2022-07-07 ENCOUNTER — TELEPHONE (OUTPATIENT)
Dept: FAMILY MEDICINE CLINIC | Facility: CLINIC | Age: 72
End: 2022-07-07

## 2022-07-25 ENCOUNTER — TELEPHONE (OUTPATIENT)
Dept: FAMILY MEDICINE CLINIC | Facility: CLINIC | Age: 72
End: 2022-07-25

## 2022-07-25 DIAGNOSIS — L30.9 ACUTE DERMATITIS: ICD-10-CM

## 2022-07-25 DIAGNOSIS — I25.10 CORONARY ARTERY DISEASE INVOLVING NATIVE HEART WITHOUT ANGINA PECTORIS, UNSPECIFIED VESSEL OR LESION TYPE: Primary | ICD-10-CM

## 2022-07-25 RX ORDER — DICLOFENAC SODIUM 75 MG/1
75 TABLET, DELAYED RELEASE ORAL 2 TIMES DAILY
Qty: 60 TABLET | Refills: 0 | Status: SHIPPED | OUTPATIENT
Start: 2022-07-25 | End: 2022-08-22

## 2022-07-25 RX ORDER — HYDROXYZINE PAMOATE 50 MG/1
50 CAPSULE ORAL 3 TIMES DAILY PRN
Qty: 42 CAPSULE | Refills: 0 | Status: ON HOLD | OUTPATIENT
Start: 2022-07-25 | End: 2022-11-30

## 2022-07-25 NOTE — TELEPHONE ENCOUNTER
PT RETURNING PROVIDER CALL.  ADVISED PT THAT PROVIDER HAD JUST LEFT FOR LUNCH WOULD HAVE HIM CALL BACK AT HIS EARLIEST CONVENIENCE.    [phone]

## 2022-08-22 RX ORDER — DICLOFENAC SODIUM 75 MG/1
TABLET, DELAYED RELEASE ORAL
Qty: 60 TABLET | Refills: 0 | Status: SHIPPED | OUTPATIENT
Start: 2022-08-22 | End: 2022-09-20

## 2022-08-22 NOTE — TELEPHONE ENCOUNTER
Rx Refill Note  Requested Prescriptions     Pending Prescriptions Disp Refills   • diclofenac (VOLTAREN) 75 MG EC tablet [Pharmacy Med Name: DICLOFENAC SOD EC 75 MG TAB] 60 tablet 0     Sig: TAKE 1 TABLET BY MOUTH 2 TIMES A DAY FOR 30 DAYS.      Last office visit with prescribing clinician: 6/16/2022      Next office visit with prescribing clinician: 8/26/2022            Kaylah Levine RN  08/22/22, 11:49 EDT

## 2022-08-26 ENCOUNTER — OFFICE VISIT (OUTPATIENT)
Dept: FAMILY MEDICINE CLINIC | Facility: CLINIC | Age: 72
End: 2022-08-26

## 2022-08-26 VITALS
RESPIRATION RATE: 18 BRPM | SYSTOLIC BLOOD PRESSURE: 162 MMHG | OXYGEN SATURATION: 98 % | TEMPERATURE: 98 F | HEART RATE: 79 BPM | BODY MASS INDEX: 27.98 KG/M2 | WEIGHT: 225 LBS | HEIGHT: 75 IN | DIASTOLIC BLOOD PRESSURE: 98 MMHG

## 2022-08-26 DIAGNOSIS — Z79.4 TYPE 2 DIABETES MELLITUS WITH DIABETIC NEUROPATHY, WITH LONG-TERM CURRENT USE OF INSULIN: Primary | ICD-10-CM

## 2022-08-26 DIAGNOSIS — R10.9 ABDOMINAL PAIN, UNSPECIFIED ABDOMINAL LOCATION: ICD-10-CM

## 2022-08-26 DIAGNOSIS — E11.40 TYPE 2 DIABETES MELLITUS WITH DIABETIC NEUROPATHY, WITH LONG-TERM CURRENT USE OF INSULIN: Primary | ICD-10-CM

## 2022-08-26 DIAGNOSIS — J01.10 ACUTE NON-RECURRENT FRONTAL SINUSITIS: ICD-10-CM

## 2022-08-26 DIAGNOSIS — I10 PRIMARY HYPERTENSION: ICD-10-CM

## 2022-08-26 LAB
EXPIRATION DATE: NORMAL
HBA1C MFR BLD: 8.3 %
Lab: NORMAL

## 2022-08-26 PROCEDURE — 99214 OFFICE O/P EST MOD 30 MIN: CPT | Performed by: FAMILY MEDICINE

## 2022-08-26 PROCEDURE — 83036 HEMOGLOBIN GLYCOSYLATED A1C: CPT | Performed by: FAMILY MEDICINE

## 2022-08-26 RX ORDER — LACTULOSE 10 G/15ML
20 SOLUTION ORAL 2 TIMES DAILY
Qty: 180 ML | Refills: 0 | Status: SHIPPED | OUTPATIENT
Start: 2022-08-26 | End: 2022-08-29

## 2022-08-26 RX ORDER — AMOXICILLIN 875 MG/1
875 TABLET, COATED ORAL 2 TIMES DAILY
Qty: 20 TABLET | Refills: 0 | Status: SHIPPED | OUTPATIENT
Start: 2022-08-26 | End: 2022-08-31

## 2022-08-26 NOTE — PROGRESS NOTES
"Chief Complaint  Follow-up (Diabetes, GERD, Anxiety & Hypokalemia) and Abdominal Pain (Left sided./Constant for the past week./)    Subjective        Panda Henry presents to [facility]  The patient is a 71 yo male who is here for follow up on his diabetes and hypertension. [facility], they started him on losartan and another medicine that he cannot remember.  The patient is also having pain on his abdomen and he thinks that he has been retaining a lot of gas or stool.  The patient also has nasal and postnasal drainage for more than a week and this morning he has blood coming from his nose with the drainage.  The patient denies any fever, chills, shortness of breath, headache or any exposure to anyone who tested positive for COVID.      Objective   Vital Signs:  /98   Pulse 79   Temp 98 °F (36.7 °C)   Resp 18   Ht 190.5 cm (75\")   Wt 102 kg (225 lb)   SpO2 98%   BMI 28.12 kg/m²   Estimated body mass index is 28.12 kg/m² as calculated from the following:    Height as of this encounter: 190.5 cm (75\").    Weight as of this encounter: 102 kg (225 lb).          Physical Exam  Vitals and nursing note reviewed.   Constitutional:       General: He is not in acute distress.     Appearance: Normal appearance. He is well-developed and well-groomed.   HENT:      Head: Normocephalic and atraumatic.      Jaw: No tenderness or pain on movement.      Salivary Glands: Right salivary gland is not diffusely enlarged. Left salivary gland is not diffusely enlarged.      Right Ear: Tympanic membrane and ear canal normal.      Left Ear: Tympanic membrane and ear canal normal.      Nose: No congestion or rhinorrhea.      Mouth/Throat:      Mouth: Mucous membranes are moist.      Pharynx: No oropharyngeal exudate or posterior oropharyngeal erythema.   Eyes:      General: Lids are normal. No allergic shiner or scleral icterus.     " Conjunctiva/sclera: Conjunctivae normal.      Pupils: Pupils are equal, round, and reactive to light.   Neck:      Thyroid: No thyroid mass, thyromegaly or thyroid tenderness.      Trachea: Trachea normal.   Cardiovascular:      Rate and Rhythm: Normal rate and regular rhythm.  No extrasystoles are present.     Pulses: Normal pulses.      Heart sounds: Normal heart sounds. No murmur heard.  Pulmonary:      Effort: Pulmonary effort is normal. No respiratory distress.      Breath sounds: Normal breath sounds. No decreased breath sounds, wheezing, rhonchi or rales.   Chest:   Breasts:      Right: Normal. No axillary adenopathy or supraclavicular adenopathy.      Left: Normal. No axillary adenopathy or supraclavicular adenopathy.       Abdominal:      General: Bowel sounds are normal.      Palpations: Abdomen is soft.      Tenderness: There is no abdominal tenderness. There is no right CVA tenderness, left CVA tenderness, guarding or rebound.   Musculoskeletal:      Cervical back: Neck supple.      Right lower leg: No edema.      Left lower leg: No edema.   Lymphadenopathy:      Cervical: No cervical adenopathy.      Upper Body:      Right upper body: No supraclavicular or axillary adenopathy.      Left upper body: No supraclavicular or axillary adenopathy.   Skin:     General: Skin is warm.      Nails: There is no clubbing.   Neurological:      General: No focal deficit present.      Mental Status: He is alert and oriented to person, place, and time.      Sensory: Sensation is intact.      Motor: Motor function is intact.      Coordination: Coordination is intact.   Psychiatric:         Attention and Perception: Attention and perception normal.         Mood and Affect: Mood normal.         Speech: Speech normal.         Behavior: Behavior normal. Behavior is cooperative.         Thought Content: Thought content normal.        Result Review :                Assessment and Plan   Diagnoses and all orders for this  visit:    1. Type 2 diabetes mellitus with diabetic neuropathy, with long-term current use of insulin (HCC) (Primary)  -     Cancel: Hemoglobin A1c  -     Cancel: POC Glycosylated Hemoglobin (Hb A1C)  -     POC Glycosylated Hemoglobin (Hb A1C)    2. Abdominal pain, unspecified abdominal location  Comments:  Constipation    Orders:  -     XR Abdomen Flat & Upright (In Office)  -     lactulose (CHRONULAC) 10 GM/15ML solution; Take 30 mL by mouth 2 (Two) Times a Day for 3 days.  Dispense: 180 mL; Refill: 0    3. Acute non-recurrent frontal sinusitis  -     amoxicillin (AMOXIL) 875 MG tablet; Take 1 tablet by mouth 2 (Two) Times a Day for 10 days.  Dispense: 20 tablet; Refill: 0    4. Primary hypertension             Follow Up   Return in about 1 month (around 9/26/2022) for Medicare Wellness, Blood work.  Patient was given instructions and counseling regarding his condition or for health maintenance advice. Please see specific information pulled into the AVS if appropriate.     The patient will be given amoxicillin 875 mg 1 p.o. twice daily and advised to continue his Zyrtec as directed.  The patient was advised to continue all his regular medications as prescribed and to call as regarding the name of the other blood pressure medicine that he is taking.  The patient will also be tried on lactulose 30 mL p.o. twice daily as needed.  The patient was again counseled regarding the importance of diet, exercise and medication compliance.  He will come back in 1 month for his Medicare wellness and blood work.        This document has been electronically signed by Alberto Green MD  August 26, 2022 16:49 EDT

## 2022-08-26 NOTE — PATIENT INSTRUCTIONS
Diabetes Mellitus and Nutrition, Adult  When you have diabetes, or diabetes mellitus, it is very important to have healthy eating habits because your blood sugar (glucose) levels are greatly affected by what you eat and drink. Eating healthy foods in the right amounts, at about the same times every day, can help you:  Control your blood glucose.  Lower your risk of heart disease.  Improve your blood pressure.  Reach or maintain a healthy weight.  What can affect my meal plan?  Every person with diabetes is different, and each person has different needs for a meal plan. Your health care provider may recommend that you work with a dietitian to make a meal plan that is best for you. Your meal plan may vary depending on factors such as:  The calories you need.  The medicines you take.  Your weight.  Your blood glucose, blood pressure, and cholesterol levels.  Your activity level.  Other health conditions you have, such as heart or kidney disease.  How do carbohydrates affect me?  Carbohydrates, also called carbs, affect your blood glucose level more than any other type of food. Eating carbs naturally raises the amount of glucose in your blood. Carb counting is a method for keeping track of how many carbs you eat. Counting carbs is important to keep your blood glucose at a healthy level, especially if you use insulin or take certain oral diabetes medicines.  It is important to know how many carbs you can safely have in each meal. This is different for every person. Your dietitian can help you calculate how many carbs you should have at each meal and for each snack.  How does alcohol affect me?  Alcohol can cause a sudden decrease in blood glucose (hypoglycemia), especially if you use insulin or take certain oral diabetes medicines. Hypoglycemia can be a life-threatening condition. Symptoms of hypoglycemia, such as sleepiness, dizziness, and confusion, are similar to symptoms of having too much alcohol.  Do not drink  "alcohol if:  Your health care provider tells you not to drink.  You are pregnant, may be pregnant, or are planning to become pregnant.  If you drink alcohol:  Do not drink on an empty stomach.  Limit how much you use to:  0-1 drink a day for women.  0-2 drinks a day for men.  Be aware of how much alcohol is in your drink. In the [place] one drink equals one 12 oz bottle of beer (355 mL), one 5 oz glass of wine (148 mL), or one 1½ oz glass of hard liquor (44 mL).  Keep yourself hydrated with water, diet soda, or unsweetened iced tea.  Keep in mind that regular soda, juice, and other mixers may contain a lot of sugar and must be counted as carbs.  What are tips for following this plan?    Reading food labels  Start by checking the serving size on the \"Nutrition Facts\" label of packaged foods and drinks. The amount of calories, carbs, fats, and other nutrients listed on the label is based on one serving of the item. Many items contain more than one serving per package.  Check the total grams (g) of carbs in one serving. You can calculate the number of servings of carbs in one serving by dividing the total carbs by 15. For example, if a food has 30 g of total carbs per serving, it would be equal to 2 servings of carbs.  Check the number of grams (g) of saturated fats and trans fats in one serving. Choose foods that have a low amount or none of these fats.  Check the number of milligrams (mg) of salt (sodium) in one serving. Most people should limit total sodium intake to less than 2,300 mg per day.  Always check the nutrition information of foods labeled as \"low-fat\" or \"nonfat.\" These foods may be higher in added sugar or refined carbs and should be avoided.  Talk to your dietitian to identify your daily goals for nutrients listed on the label.  Shopping  Avoid buying canned, pre-made, or processed foods. These foods tend to be high in fat, sodium, and added sugar.  Shop around the outside edge of the grocery store. This " is where you will most often find fresh fruits and vegetables, bulk grains, fresh meats, and fresh dairy.  Cooking  Use low-heat cooking methods, such as baking, instead of high-heat cooking methods like deep frying.  Cook using healthy oils, such as olive, canola, or sunflower oil.  Avoid cooking with butter, cream, or high-fat meats.  Meal planning  Eat meals and snacks regularly, preferably at the same times every day. Avoid going long periods of time without eating.  Eat foods that are high in fiber, such as fresh fruits, vegetables, beans, and whole grains. Talk with your dietitian about how many servings of carbs you can eat at each meal.  Eat 4-6 oz (112-168 g) of lean protein each day, such as lean meat, chicken, fish, eggs, or tofu. One ounce (oz) of lean protein is equal to:  1 oz (28 g) of meat, chicken, or fish.  1 egg.  ¼ cup (62 g) of tofu.  Eat some foods each day that contain healthy fats, such as avocado, nuts, seeds, and fish.  What foods should I eat?  Fruits  Berries. Apples. Oranges. Peaches. Apricots. Plums. Grapes. Hackberry. Papaya. Pomegranate. Kiwi. Cherries.  Vegetables  Lettuce. Spinach. Leafy greens, including kale, chard, justin greens, and mustard greens. Beets. Cauliflower. Cabbage. Broccoli. Carrots. Green beans. Tomatoes. Peppers. Onions. Cucumbers. Datil sprouts.  Grains  Whole grains, such as whole-wheat or whole-grain bread, crackers, tortillas, cereal, and pasta. Unsweetened oatmeal. Quinoa. Brown or wild rice.  Meats and other proteins  Seafood. Poultry without skin. Lean cuts of poultry and beef. Tofu. Nuts. Seeds.  Dairy  Low-fat or fat-free dairy products such as milk, yogurt, and cheese.  The items listed above may not be a complete list of foods and beverages you can eat. Contact a dietitian for more information.  What foods should I avoid?  Fruits  Fruits canned with syrup.  Vegetables  Canned vegetables. Frozen vegetables with butter or cream sauce.  Grains  Refined  white flour and flour products such as bread, pasta, snack foods, and cereals. Avoid all processed foods.  Meats and other proteins  Fatty cuts of meat. Poultry with skin. Breaded or fried meats. Processed meat. Avoid saturated fats.  Dairy  Full-fat yogurt, cheese, or milk.  Beverages  Sweetened drinks, such as soda or iced tea.  The items listed above may not be a complete list of foods and beverages you should avoid. Contact a dietitian for more information.  Questions to ask a health care provider  Do I need to meet with a diabetes educator?  Do I need to meet with a dietitian?  What number can I call if I have questions?  When are the best times to check my blood glucose?  Where to find more information:  American Diabetes Association: diabetes.org  Academy of Nutrition and Dietetics: www.eatright.org  National [place] of Diabetes and Digestive and Kidney Diseases: www.niddk.nih.gov  Association of Diabetes Care and Education Specialists: www.diabeteseducator.org  Summary  It is important to have healthy eating habits because your blood sugar (glucose) levels are greatly affected by what you eat and drink.  A healthy meal plan will help you control your blood glucose and maintain a healthy lifestyle.  Your health care provider may recommend that you work with a dietitian to make a meal plan that is best for you.  Keep in mind that carbohydrates (carbs) and alcohol have immediate effects on your blood glucose levels. It is important to count carbs and to use alcohol carefully.  This information is not intended to replace advice given to you by your health care provider. Make sure you discuss any questions you have with your health care provider.  Document Revised: 11/24/2020 Document Reviewed: 11/24/2020  [employer] Patient Education © 2021 [employer]  Type 2 Diabetes Mellitus, Diagnosis, Adult  Type 2 diabetes (type 2 diabetes mellitus) is a long-term, or chronic, disease. In type 2 diabetes, one or both of  these problems may be present:  The pancreas does not make enough of a hormone called insulin.  Cells in the body do not respond properly to insulin that the body makes (insulin resistance).  Normally, insulin allows blood sugar (glucose) to enter cells in the body. The cells use glucose for energy. Insulin resistance or lack of insulin causes excess glucose to build up in the blood instead of going into cells. This causes high blood glucose (hyperglycemia).   What are the causes?  The exact cause of type 2 diabetes is not known.  What increases the risk?  The following factors may make you more likely to develop this condition:  Having a family member with type 2 diabetes.  Being overweight or obese.  Being inactive (sedentary).  Having been diagnosed with insulin resistance.  Having a history of prediabetes, diabetes when you were pregnant (gestational diabetes), or polycystic ovary syndrome (PCOS).  What are the signs or symptoms?  In the early stage of this condition, you may not have symptoms. Symptoms develop slowly and may include:  Increased thirst or hunger.  Increased urination.  Unexplained weight loss.  Tiredness (fatigue) or weakness.  Vision changes, such as blurry vision.  Dark patches on the skin.  How is this diagnosed?  This condition is diagnosed based on your symptoms, your medical history, a physical exam, and your blood glucose level. Your blood glucose may be checked with one or more of the following blood tests:  A fasting blood glucose (FBG) test. You will not be allowed to eat (you will fast) for 8 hours or longer before a blood sample is taken.  A random blood glucose test. This test checks blood glucose at any time of day regardless of when you ate.  An A1C (hemoglobin A1C) blood test. This test provides information about blood glucose levels over the previous 2-3 months.  An oral glucose tolerance test (OGTT). This test measures your blood glucose at two times:  After fasting. This is  your baseline blood glucose level.  Two hours after drinking a beverage that contains glucose.  You may be diagnosed with type 2 diabetes if:  Your fasting blood glucose level is 126 mg/dL (7.0 mmol/L) or higher.  Your random blood glucose level is 200 mg/dL (11.1 mmol/L) or higher.  Your A1C level is 6.5% or higher.  Your oral glucose tolerance test result is higher than 200 mg/dL (11.1 mmol/L).  These blood tests may be repeated to confirm your diagnosis.  How is this treated?  Your treatment may be managed by a specialist called an endocrinologist. Type 2 diabetes may be treated by following instructions from your health care provider about:  Making dietary and lifestyle changes. These may include:  Following a personalized nutrition plan that is developed by a registered dietitian.  Exercising regularly.  Finding ways to manage stress.  Checking your blood glucose level as often as told.  Taking diabetes medicines or insulin daily. This helps to keep your blood glucose levels in the healthy range.  Taking medicines to help prevent complications from diabetes. Medicines may include:  Aspirin.  Medicine to lower cholesterol.  Medicine to control blood pressure.  Your health care provider will set treatment goals for you. Your goals will be based on your age, other medical conditions you have, and how you respond to diabetes treatment. Generally, the goal of treatment is to maintain the following blood glucose levels:  Before meals:  mg/dL (4.4-7.2 mmol/L).  After meals: below 180 mg/dL (10 mmol/L).  A1C level: less than 7%.  Follow these instructions at home:  Questions to ask your health care provider  Consider asking the following questions:  Should I meet with a certified diabetes care and ?  What diabetes medicines do I need, and when should I take them?  What equipment will I need to manage my diabetes at home?  How often do I need to check my blood glucose?  Where can I find a  support group for people with diabetes?  What number can I call if I have questions?  When is my next appointment?  General instructions  Take over-the-counter and prescription medicines only as told by your health care provider.  Keep all follow-up visits as told by your health care provider. This is important.  Where to find more information  American Diabetes Association (ADA): www.diabetes.org  American Association of Diabetes Care and Education Specialists (ADCES): www.diabeteseducator.org  International Diabetes Federation (IDF): www.idf.org  Contact a health care provider if:  Your blood glucose is at or above 240 mg/dL (13.3 mmol/L) for 2 days in a row.  You have been sick or have had a fever for 2 days or longer, and you are not getting better.  You have any of the following problems for more than 6 hours:  You cannot eat or drink.  You have nausea and vomiting.  You have diarrhea.  Get help right away if:  You have severe hypoglycemia. This means your blood glucose is lower than 54 mg/dL (3.0 mmol/L).  You become confused or you have trouble thinking clearly.  You have difficulty breathing.  You have moderate or large ketone levels in your urine.  These symptoms may represent a serious problem that is an emergency. Do not wait to see if the symptoms will go away. Get medical help right away. Call your local emergency services (911 in the [place] Do not drive yourself to the hospital.  Summary  Type 2 diabetes (type 2 diabetes mellitus) is a long-term, or chronic, disease. In type 2 diabetes, the pancreas does not make enough of a hormone called insulin, or cells in the body do not respond properly to insulin that the body makes (insulin resistance).  This condition is treated by making dietary and lifestyle changes and taking diabetes medicines or insulin.  Your health care provider will set treatment goals for you. Your goals will be based on your age, other medical conditions you have, and how you respond  to diabetes treatment.  Keep all follow-up visits as told by your health care provider. This is important.  This information is not intended to replace advice given to you by your health care provider. Make sure you discuss any questions you have with your health care provider.  Document Revised: 07/14/2021 Document Reviewed: 07/14/2021  [employer] Patient Education © 2021 [employer]  Sinusitis, Adult  Sinusitis is soreness and swelling (inflammation) of your sinuses. Sinuses are hollow spaces in the bones around your face. They are located:  Around your eyes.  In the middle of your forehead.  Behind your nose.  In your cheekbones.  Your sinuses and nasal passages are lined with a fluid called mucus. Mucus drains out of your sinuses. Swelling can trap mucus in your sinuses. This lets germs (bacteria, virus, or fungus) grow, which leads to infection. Most of the time, this condition is caused by a virus.  What are the causes?  This condition is caused by:  Allergies.  Asthma.  Germs.  Things that block your nose or sinuses.  Growths in the nose (nasal polyps).  Chemicals or irritants in the air.  Fungus (rare).  What increases the risk?  You are more likely to develop this condition if:  You have a weak body defense system (immune system).  You do a lot of swimming or diving.  You use nasal sprays too much.  You smoke.  What are the signs or symptoms?  The main symptoms of this condition are pain and a feeling of pressure around the sinuses. Other symptoms include:  Stuffy nose (congestion).  Runny nose (drainage).  Swelling and warmth in the sinuses.  Headache.  Toothache.  A cough that may get worse at night.  Mucus that collects in the throat or the back of the nose (postnasal drip).  Being unable to smell and taste.  Being very tired (fatigue).  A fever.  Sore throat.  Bad breath.  How is this diagnosed?  This condition is diagnosed based on:  Your symptoms.  Your medical history.  A physical exam.  Tests to  find out if your condition is short-term (acute) or long-term (chronic). Your doctor may:  Check your nose for growths (polyps).  Check your sinuses using a tool that has a light (endoscope).  Check for allergies or germs.  Do imaging tests, such as an MRI or CT scan.  How is this treated?  Treatment for this condition depends on the cause and whether it is short-term or long-term.  If caused by a virus, your symptoms should go away on their own within 10 days. You may be given medicines to relieve symptoms. They include:  Medicines that shrink swollen tissue in the nose.  Medicines that treat allergies (antihistamines).  A spray that treats swelling of the nostrils.   Rinses that help get rid of thick mucus in your nose (nasal saline washes).  If caused by bacteria, your doctor may wait to see if you will get better without treatment. You may be given antibiotic medicine if you have:  A very bad infection.  A weak body defense system.  If caused by growths in the nose, you may need to have surgery.  Follow these instructions at home:  Medicines  Take, use, or apply over-the-counter and prescription medicines only as told by your doctor. These may include nasal sprays.  If you were prescribed an antibiotic medicine, take it as told by your doctor. Do not stop taking the antibiotic even if you start to feel better.  Hydrate and humidify    Drink enough water to keep your pee (urine) pale yellow.  Use a cool mist humidifier to keep the humidity level in your home above 50%.  Breathe in steam for 10-15 minutes, 3-4 times a day, or as told by your doctor. You can do this in the bathroom while a hot shower is running.  Try not to spend time in cool or dry air.    Rest  Rest as much as you can.  Sleep with your head raised (elevated).  Make sure you get enough sleep each night.  General instructions    Put a warm, moist washcloth on your face 3-4 times a day, or as often as told by your doctor. This will help with  discomfort.  Wash your hands often with soap and water. If there is no soap and water, use hand .  Do not smoke. Avoid being around people who are smoking (secondhand smoke).  Keep all follow-up visits as told by your doctor. This is important.    Contact a doctor if:  You have a fever.  Your symptoms get worse.  Your symptoms do not get better within 10 days.  Get help right away if:  You have a very bad headache.  You cannot stop throwing up (vomiting).  You have very bad pain or swelling around your face or eyes.  You have trouble seeing.  You feel confused.  Your neck is stiff.  You have trouble breathing.  Summary  Sinusitis is swelling of your sinuses. Sinuses are hollow spaces in the bones around your face.  This condition is caused by tissues in your nose that become inflamed or swollen. This traps germs. These can lead to infection.  If you were prescribed an antibiotic medicine, take it as told by your doctor. Do not stop taking it even if you start to feel better.  Keep all follow-up visits as told by your doctor. This is important.  This information is not intended to replace advice given to you by your health care provider. Make sure you discuss any questions you have with your health care provider.  Document Revised: 05/20/2019 Document Reviewed: 05/20/2019  [employer] Patient Education © 2021 [employer]

## 2022-08-31 ENCOUNTER — PATIENT ROUNDING (BHMG ONLY) (OUTPATIENT)
Dept: CARDIOLOGY | Facility: CLINIC | Age: 72
End: 2022-08-31

## 2022-08-31 ENCOUNTER — OFFICE VISIT (OUTPATIENT)
Dept: CARDIOLOGY | Facility: CLINIC | Age: 72
End: 2022-08-31

## 2022-08-31 VITALS
WEIGHT: 230 LBS | SYSTOLIC BLOOD PRESSURE: 150 MMHG | DIASTOLIC BLOOD PRESSURE: 75 MMHG | BODY MASS INDEX: 28.75 KG/M2 | OXYGEN SATURATION: 98 % | HEART RATE: 88 BPM

## 2022-08-31 DIAGNOSIS — R06.02 SHORTNESS OF BREATH: ICD-10-CM

## 2022-08-31 DIAGNOSIS — I10 PRIMARY HYPERTENSION: ICD-10-CM

## 2022-08-31 DIAGNOSIS — I25.119 CORONARY ARTERY DISEASE INVOLVING NATIVE CORONARY ARTERY OF NATIVE HEART WITH ANGINA PECTORIS: Primary | ICD-10-CM

## 2022-08-31 DIAGNOSIS — R07.2 PRECORDIAL PAIN: ICD-10-CM

## 2022-08-31 PROCEDURE — 93000 ELECTROCARDIOGRAM COMPLETE: CPT | Performed by: PHYSICIAN ASSISTANT

## 2022-08-31 PROCEDURE — 99214 OFFICE O/P EST MOD 30 MIN: CPT | Performed by: PHYSICIAN ASSISTANT

## 2022-08-31 RX ORDER — CETIRIZINE HYDROCHLORIDE 10 MG/1
10 TABLET ORAL DAILY
COMMUNITY
End: 2023-02-28

## 2022-08-31 RX ORDER — HYDROCHLOROTHIAZIDE 12.5 MG/1
6.25 TABLET ORAL DAILY
COMMUNITY
End: 2022-09-14

## 2022-08-31 RX ORDER — CARVEDILOL 6.25 MG/1
3.12 TABLET ORAL 2 TIMES DAILY WITH MEALS
COMMUNITY
End: 2022-09-14 | Stop reason: SDUPTHER

## 2022-08-31 RX ORDER — TAMSULOSIN HYDROCHLORIDE 0.4 MG/1
1 CAPSULE ORAL DAILY
COMMUNITY

## 2022-08-31 NOTE — PROGRESS NOTES
Van Henry is a 72 y.o. male     Chief Complaint   Patient presents with   • New patient       HPI    The patient presents to clinic today to establish cardiovascular care.  He is referred because of cardiovascular history, and symptoms otherwise.  The patient reports cardiovascular history including stenting to, by his report, the LAD in approximately 2020.  He had nonobstructive disease noted otherwise.  We have requested that cath report is that is not available today.  The patient is referred back because of cardiac history and even episodes of chest tightness noted recently.  He tells me that he will note a sensation of heaviness or aching in the center of the chest at times.  He has ongoing dyspnea which can be limiting as well.  He has no associated neck, arm, or jaw discomfort.  He reports no failure nor dysrhythmic symptoms at this time.  Exercise capacity appears to be gradually declining, at least in part related to his degree of chest tightness and shortness of air.  He did go see his PCP and eventually has now been referred on to the clinic today.  It is noted he had a CT of the chest in approximately June of this year.  From cardiovascular standpoint, this supported advanced and diffuse calcification of the coronary artery disease, in particular the LAD.  Cardiac evaluation and work-up has now been requested.    Current Outpatient Medications   Medication Sig Dispense Refill   • albuterol sulfate  (90 Base) MCG/ACT inhaler Inhale 2 puffs Every 4 (Four) Hours As Needed for Wheezing.     • aspirin 81 MG EC tablet Take 81 mg by mouth Daily.     • budesonide-formoterol (SYMBICORT) 160-4.5 MCG/ACT inhaler Inhale 2 puffs 2 (Two) Times a Day for 30 days. 10.6 g 2   • carvedilol (COREG) 6.25 MG tablet Take 3.125 mg by mouth 2 (Two) Times a Day With Meals.     • cetirizine (zyrTEC) 10 MG tablet Take 10 mg by mouth Daily.     • clopidogrel (PLAVIX) 75 MG tablet TAKE 1 TABLET BY MOUTH EVERY  DAY 90 tablet 0   • [facility] Gentle Laxative 5 MG EC tablet      • diclofenac (VOLTAREN) 75 MG EC tablet TAKE 1 TABLET BY MOUTH 2 TIMES A DAY FOR 30 DAYS. 60 tablet 0   • DOCUSATE SODIUM PO Take  by mouth.     • doxepin (SINEquan) 100 MG capsule Take 100 mg by mouth Every Night.     • escitalopram (LEXAPRO) 20 MG tablet Take 30 mg by mouth Daily.     • ezetimibe (ZETIA) 10 MG tablet Take 10 mg by mouth Daily.     • gabapentin (NEURONTIN) 300 MG capsule 300 mg Daily.     • hydroCHLOROthiazide (HYDRODIURIL) 12.5 MG tablet Take 6.25 mg by mouth Daily.     • insulin aspart (novoLOG FLEXPEN) 100 UNIT/ML solution pen-injector sc pen Inject 18 Units under the skin into the appropriate area as directed Every Morning. 16 units in the am, 14 units in the Pm     • insulin glargine (LANTUS, SEMGLEE) 100 UNIT/ML injection Inject 52 Units under the skin into the appropriate area as directed Daily.     • ketotifen (ZADITOR) 0.025 % ophthalmic solution 1 drop 2 (Two) Times a Day.     • LORazepam (ATIVAN) 2 MG tablet Take 2 mg by mouth 2 (Two) Times a Day.     • losartan (COZAAR) 50 MG tablet Take 50 mg by mouth Daily.     • lovastatin (MEVACOR) 40 MG tablet Take 1 tablet by mouth Every Night for 30 days. 30 tablet 2   • montelukast (SINGULAIR) 10 MG tablet Take 10 mg by mouth Every Night.     • nitroglycerin (NITROSTAT) 0.4 MG SL tablet Place 0.4 mg under the tongue Every 5 (Five) Minutes As Needed for Chest Pain. Take no more than 3 doses in 15 minutes.     • omeprazole (priLOSEC) 40 MG capsule Take 40 mg by mouth Daily.     • simethicone (MYLICON) 80 MG chewable tablet Chew 80 mg Every 6 (Six) Hours As Needed for Flatulence.     • tadalafil (CIALIS) 20 MG tablet TAKE 1 TABLET BY MOUTH ONCE DAILY BEFORE SEX     • tamsulosin (FLOMAX) 0.4 MG capsule 24 hr capsule Take 1 capsule by mouth Daily.     • triamcinolone (KENALOG) 0.1 % cream Apply  topically to the appropriate area as directed 2 (Two) Times a Day.     • hydrOXYzine pamoate  (Vistaril) 50 MG capsule Take 1 capsule by mouth 3 (Three) Times a Day As Needed for Allergies for up to 14 days. 42 capsule 0   • traMADol (ULTRAM) 50 MG tablet Take 50 mg by mouth Every 6 (Six) Hours As Needed. for pain       No current facility-administered medications for this visit.       Dust mite extract, Grass, Molds & smuts, and Pollen extract    Past Medical History:   Diagnosis Date   • Arthritis    • Cancer (HCC)     basal cell carcinoma   • COPD (chronic obstructive pulmonary disease) (HCC)    • Coronary artery disease    • Depression    • Diabetes mellitus (HCC)    • GERD (gastroesophageal reflux disease)    • Hyperlipidemia    • Hypertension    • Injury of back    • Neuropathy    • Spinal stenosis        Social History     Socioeconomic History   • Marital status:    Tobacco Use   • Smoking status: Never Smoker   • Smokeless tobacco: Current User     Types: Chew   Vaping Use   • Vaping Use: Never used   Substance and Sexual Activity   • Alcohol use: Never     Comment: Drank some years ago   • Drug use: Never   • Sexual activity: Defer       Family History   Problem Relation Age of Onset   • Diabetes Mother    • Stroke Mother    • Coronary artery disease Mother    • Lung cancer Father    • Coronary artery disease Father    • Heart failure Sister    • Coronary artery disease Brother    • Aneurysm Brother         brain   • Cancer Brother         unknown location   • Alcohol abuse Brother        Review of Systems   Constitutional: Negative.  Negative for activity change, appetite change, chills, fatigue and fever.   Eyes: Positive for visual disturbance.   Respiratory: Positive for cough (Drainage), chest tightness and shortness of breath. Negative for apnea and wheezing.    Cardiovascular: Negative.  Negative for chest pain, palpitations and leg swelling.   Gastrointestinal: Negative.  Negative for blood in stool.   Endocrine: Negative.  Negative for cold intolerance and heat intolerance.    Genitourinary: Negative.    Musculoskeletal: Positive for gait problem, neck pain and neck stiffness.   Skin: Positive for rash (Right/left side ). Negative for color change and wound.   Allergic/Immunologic: Positive for environmental allergies. Negative for food allergies.   Neurological: Positive for dizziness, light-headedness (Becomes light headed with standing), numbness (RT hand, some in left hand) and headaches. Negative for syncope and weakness.   Hematological: Bruises/bleeds easily.   Psychiatric/Behavioral: Negative.  Negative for sleep disturbance.       Objective     Vitals:    08/31/22 1010   BP: 150/75   BP Location: Left arm   Patient Position: Sitting   Cuff Size: Adult   Pulse: 88   SpO2: 98%   Weight: 104 kg (230 lb)        /75 (BP Location: Left arm, Patient Position: Sitting, Cuff Size: Adult)   Pulse 88   Wt 104 kg (230 lb)   SpO2 98%   BMI 28.75 kg/m²      Lab Results (most recent)     None          Physical Exam  Vitals and nursing note reviewed.   Constitutional:       General: He is not in acute distress.     Appearance: He is well-developed.   HENT:      Head: Normocephalic and atraumatic.   Eyes:      Conjunctiva/sclera: Conjunctivae normal.      Pupils: Pupils are equal, round, and reactive to light.   Neck:      Vascular: No JVD.      Trachea: No tracheal deviation.   Cardiovascular:      Rate and Rhythm: Normal rate and regular rhythm.      Heart sounds: Normal heart sounds.   Pulmonary:      Effort: Pulmonary effort is normal.      Breath sounds: Normal breath sounds.   Abdominal:      General: Bowel sounds are normal. There is no distension.      Palpations: Abdomen is soft. There is no mass.      Tenderness: There is no abdominal tenderness. There is no guarding or rebound.   Musculoskeletal:         General: No tenderness or deformity. Normal range of motion.      Cervical back: Normal range of motion and neck supple.   Skin:     General: Skin is warm and dry.       Coloration: Skin is not pale.      Findings: No erythema or rash.   Neurological:      Mental Status: He is alert and oriented to person, place, and time.   Psychiatric:         Behavior: Behavior normal.         Thought Content: Thought content normal.         Judgment: Judgment normal.         Procedure     ECG 12 Lead    Date/Time: 8/31/2022 10:27 AM  Performed by: Paul Reeves PA  Authorized by: Paul Reeves PA   Comparison: compared with previous ECG from 8/3/2022  Comparison to previous ECG: Sinus rhythm, rate 73, normal axis, no acute changes noted.                   Assessment & Plan      Diagnosis Plan   1. Coronary artery disease involving native coronary artery of native heart with angina pectoris (HCC)  Adult Transthoracic Echo Complete W/ Cont if Necessary Per Protocol    Stress Test With Myocardial Perfusion One Day   2. Precordial pain  Adult Transthoracic Echo Complete W/ Cont if Necessary Per Protocol    Stress Test With Myocardial Perfusion One Day   3. Shortness of breath  Adult Transthoracic Echo Complete W/ Cont if Necessary Per Protocol    Stress Test With Myocardial Perfusion One Day   4. Primary hypertension  Adult Transthoracic Echo Complete W/ Cont if Necessary Per Protocol    Stress Test With Myocardial Perfusion One Day   1.  With cardiovascular history including stenting by patient report and ongoing symptoms, I would like to have the patient perform cardiac testing at this time.  He will be scheduled for nuclear stress test for ischemia assessment at this time.    2.  I would also schedule for an echocardiogram to evaluate LV size and function, valvular morphologies, and cardiac structure otherwise.    3.  I feel that the patient is on appropriate medications for now.  I would make no adjustments at this time.    4.  We will see him back after stress and echo studies are available and recommended further.  He will call for any issues prior to follow-up.           Patient  brought in medicine list to appointment, it's been reviewed with patient and med list was updated in the chart.     Advance Care Planning   ACP discussion was declined by the patient. Patient has an advance directive in EMR which is still valid.            Electronically signed by:

## 2022-08-31 NOTE — PROGRESS NOTES
August 31, 2022    Hello, may I speak with Panda Henry?    My name is CARLOS ZIEGLER     [facility] am  with [facility]  [address] [place] [id number] [phone].    Before we get started may I verify your date of birth? 1950    I am calling to officially welcome you to our practice and ask about your recent visit. Is this a good time to talk? yes    Tell me about your visit with us. What things went well?  EVERYTHING WENT WELL.         We're always looking for ways to make our patients' experiences even better. Do you have recommendations on ways we may improve?  no.  IT WAS ALL GOOD EXCEPT SLOW GETTING IN.  I REALLY EVERYBODY.      Overall were you satisfied with your first visit to our practice? yes       I appreciate you taking the time to speak with me today. Is there anything else I can do for you? Yes.  HE MADE ME AN APPT FOR       Thank you, and have a great day.

## 2022-09-14 ENCOUNTER — CLINICAL SUPPORT (OUTPATIENT)
Dept: CARDIOLOGY | Facility: CLINIC | Age: 72
End: 2022-09-14

## 2022-09-14 VITALS — HEART RATE: 81 BPM | SYSTOLIC BLOOD PRESSURE: 196 MMHG | DIASTOLIC BLOOD PRESSURE: 94 MMHG

## 2022-09-14 DIAGNOSIS — I10 PRIMARY HYPERTENSION: Primary | ICD-10-CM

## 2022-09-14 RX ORDER — CARVEDILOL 6.25 MG/1
6.25 TABLET ORAL 2 TIMES DAILY WITH MEALS
Qty: 60 TABLET | Refills: 11
Start: 2022-09-14 | End: 2022-10-10 | Stop reason: SDUPTHER

## 2022-09-14 NOTE — PROGRESS NOTES
Panda Henry  1950  9/14/2022   ?   Chief Complaint   Patient presents with   • Hypertension     BP check      ?   HPI:   ?   ? Patient presents as nurse visit for BP check. [facility] and increased Losartan to 50 mg BID. Patient has been monitoring his BP at home and brought BP log for review. He reports BP still drops between positions (as documented) causing dizziness. Marsha Lucas MA    ?     Current Outpatient Medications:   •  albuterol sulfate  (90 Base) MCG/ACT inhaler, Inhale 2 puffs Every 4 (Four) Hours As Needed for Wheezing., Disp: , Rfl:   •  aspirin 81 MG EC tablet, Take 81 mg by mouth Daily., Disp: , Rfl:   •  budesonide-formoterol (SYMBICORT) 160-4.5 MCG/ACT inhaler, Inhale 2 puffs 2 (Two) Times a Day for 30 days., Disp: 10.6 g, Rfl: 2  •  carvedilol (COREG) 6.25 MG tablet, Take 1 tablet by mouth 2 (Two) Times a Day With Meals., Disp: 60 tablet, Rfl: 11  •  cetirizine (zyrTEC) 10 MG tablet, Take 10 mg by mouth Daily., Disp: , Rfl:   •  clopidogrel (PLAVIX) 75 MG tablet, TAKE 1 TABLET BY MOUTH EVERY DAY, Disp: 90 tablet, Rfl: 0  •  [facility] Gentle Laxative 5 MG EC tablet, , Disp: , Rfl:   •  diclofenac (VOLTAREN) 75 MG EC tablet, TAKE 1 TABLET BY MOUTH 2 TIMES A DAY FOR 30 DAYS., Disp: 60 tablet, Rfl: 0  •  DOCUSATE SODIUM PO, Take  by mouth., Disp: , Rfl:   •  doxepin (SINEquan) 100 MG capsule, Take 100 mg by mouth Every Night., Disp: , Rfl:   •  escitalopram (LEXAPRO) 20 MG tablet, Take 30 mg by mouth Daily., Disp: , Rfl:   •  ezetimibe (ZETIA) 10 MG tablet, Take 10 mg by mouth Daily., Disp: , Rfl:   •  gabapentin (NEURONTIN) 300 MG capsule, 300 mg Daily., Disp: , Rfl:   •  hydrOXYzine pamoate (Vistaril) 50 MG capsule, Take 1 capsule by mouth 3 (Three) Times a Day As Needed for Allergies for up to 14 days., Disp: 42 capsule, Rfl: 0  •  insulin aspart (novoLOG FLEXPEN) 100 UNIT/ML solution pen-injector sc pen, Inject 18 Units under the skin into the appropriate  area as directed Every Morning. 16 units in the am, 14 units in the Pm, Disp: , Rfl:   •  insulin glargine (LANTUS, SEMGLEE) 100 UNIT/ML injection, Inject 52 Units under the skin into the appropriate area as directed Daily., Disp: , Rfl:   •  ketotifen (ZADITOR) 0.025 % ophthalmic solution, 1 drop 2 (Two) Times a Day., Disp: , Rfl:   •  LORazepam (ATIVAN) 2 MG tablet, Take 2 mg by mouth 2 (Two) Times a Day., Disp: , Rfl:   •  losartan (COZAAR) 50 MG tablet, Take 50 mg by mouth 2 (Two) Times a Day., Disp: , Rfl:   •  lovastatin (MEVACOR) 40 MG tablet, Take 1 tablet by mouth Every Night for 30 days., Disp: 30 tablet, Rfl: 2  •  montelukast (SINGULAIR) 10 MG tablet, Take 10 mg by mouth Every Night., Disp: , Rfl:   •  nitroglycerin (NITROSTAT) 0.4 MG SL tablet, Place 0.4 mg under the tongue Every 5 (Five) Minutes As Needed for Chest Pain. Take no more than 3 doses in 15 minutes., Disp: , Rfl:   •  omeprazole (priLOSEC) 40 MG capsule, Take 40 mg by mouth Daily., Disp: , Rfl:   •  simethicone (MYLICON) 80 MG chewable tablet, Chew 80 mg Every 6 (Six) Hours As Needed for Flatulence., Disp: , Rfl:   •  tamsulosin (FLOMAX) 0.4 MG capsule 24 hr capsule, Take 1 capsule by mouth Daily., Disp: , Rfl:   •  traMADol (ULTRAM) 50 MG tablet, Take 50 mg by mouth Every 6 (Six) Hours As Needed. for pain, Disp: , Rfl:   •  triamcinolone (KENALOG) 0.1 % cream, Apply  topically to the appropriate area as directed 2 (Two) Times a Day., Disp: , Rfl:    ?   ?   Dust mite extract, Grass, Molds & smuts, and Pollen extract       Procedures     ?   Assessment & Plan    ?   ?    1. Hypertension    BP, vitals and BP log reviewed by Paul Reeves PA-C with verbal order to increase Carvedilol to 6.25 mg BID from half tablet BID. He will monitor BP/HR and call with readings. Testing scheduled next month. Marsha Lucas MA    ?

## 2022-09-20 RX ORDER — DICLOFENAC SODIUM 75 MG/1
TABLET, DELAYED RELEASE ORAL
Qty: 60 TABLET | Refills: 0 | Status: SHIPPED | OUTPATIENT
Start: 2022-09-20 | End: 2022-10-19

## 2022-09-28 ENCOUNTER — OFFICE VISIT (OUTPATIENT)
Dept: FAMILY MEDICINE CLINIC | Facility: CLINIC | Age: 72
End: 2022-09-28

## 2022-09-28 ENCOUNTER — OFFICE VISIT (OUTPATIENT)
Dept: CARDIOLOGY | Facility: CLINIC | Age: 72
End: 2022-09-28

## 2022-09-28 VITALS
OXYGEN SATURATION: 98 % | HEART RATE: 79 BPM | WEIGHT: 230 LBS | DIASTOLIC BLOOD PRESSURE: 72 MMHG | BODY MASS INDEX: 28.6 KG/M2 | HEIGHT: 75 IN | SYSTOLIC BLOOD PRESSURE: 131 MMHG

## 2022-09-28 VITALS
HEART RATE: 71 BPM | HEIGHT: 75 IN | BODY MASS INDEX: 28.72 KG/M2 | WEIGHT: 231 LBS | RESPIRATION RATE: 18 BRPM | DIASTOLIC BLOOD PRESSURE: 71 MMHG | SYSTOLIC BLOOD PRESSURE: 160 MMHG | TEMPERATURE: 97.7 F | OXYGEN SATURATION: 98 %

## 2022-09-28 DIAGNOSIS — Z00.00 ENCOUNTER FOR SUBSEQUENT ANNUAL WELLNESS VISIT (AWV) IN MEDICARE PATIENT: Primary | ICD-10-CM

## 2022-09-28 DIAGNOSIS — G62.9 POLYNEUROPATHY: ICD-10-CM

## 2022-09-28 DIAGNOSIS — R06.02 SHORTNESS OF BREATH: ICD-10-CM

## 2022-09-28 DIAGNOSIS — I95.1 ORTHOSTASIS: ICD-10-CM

## 2022-09-28 DIAGNOSIS — I25.119 CORONARY ARTERY DISEASE INVOLVING NATIVE CORONARY ARTERY OF NATIVE HEART WITH ANGINA PECTORIS: Primary | ICD-10-CM

## 2022-09-28 DIAGNOSIS — R07.2 PRECORDIAL PAIN: ICD-10-CM

## 2022-09-28 PROCEDURE — 1170F FXNL STATUS ASSESSED: CPT | Performed by: NURSE PRACTITIONER

## 2022-09-28 PROCEDURE — 1159F MED LIST DOCD IN RCRD: CPT | Performed by: NURSE PRACTITIONER

## 2022-09-28 PROCEDURE — G0439 PPPS, SUBSEQ VISIT: HCPCS | Performed by: NURSE PRACTITIONER

## 2022-09-28 PROCEDURE — 99213 OFFICE O/P EST LOW 20 MIN: CPT | Performed by: PHYSICIAN ASSISTANT

## 2022-09-28 PROCEDURE — 96160 PT-FOCUSED HLTH RISK ASSMT: CPT | Performed by: NURSE PRACTITIONER

## 2022-09-28 RX ORDER — FINASTERIDE 5 MG/1
5 TABLET, FILM COATED ORAL DAILY
COMMUNITY

## 2022-09-28 NOTE — PROGRESS NOTES
The ABCs of the Annual Wellness Visit  Subsequent Medicare Wellness Visit    Chief Complaint   Patient presents with   • Medicare Wellness-subsequent      Subjective    History of Present Illness:  Panda Henry is a 72 y.o. male who presents for a Subsequent Medicare Wellness Visit.    The following portions of the patient's history were reviewed and   updated as appropriate: allergies, current medications, past family history, past medical history, past social history, past surgical history and problem list.    Compared to one year ago, the patient feels his physical   health is better.    Compared to one year ago, the patient feels his mental   health is the same.    Recent Hospitalizations:  He was not admitted to the hospital during the last year.       Current Medical Providers:  Patient Care Team:  Alberto Green MD as PCP - General (Family Medicine)    Outpatient Medications Prior to Visit   Medication Sig Dispense Refill   • albuterol sulfate  (90 Base) MCG/ACT inhaler Inhale 2 puffs Every 4 (Four) Hours As Needed for Wheezing.     • aspirin 81 MG EC tablet Take 81 mg by mouth Daily.     • budesonide-formoterol (SYMBICORT) 160-4.5 MCG/ACT inhaler Inhale 2 puffs 2 (Two) Times a Day for 30 days. 10.6 g 2   • carvedilol (COREG) 6.25 MG tablet Take 1 tablet by mouth 2 (Two) Times a Day With Meals. 60 tablet 11   • cetirizine (zyrTEC) 10 MG tablet Take 10 mg by mouth Daily.     • clopidogrel (PLAVIX) 75 MG tablet TAKE 1 TABLET BY MOUTH EVERY DAY 90 tablet 0   • [facility] Gentle Laxative 5 MG EC tablet      • diclofenac (VOLTAREN) 75 MG EC tablet TAKE 1 TABLET BY MOUTH 2 TIMES A DAY 60 tablet 0   • DOCUSATE SODIUM PO Take  by mouth.     • doxepin (SINEquan) 100 MG capsule Take 100 mg by mouth Every Night.     • escitalopram (LEXAPRO) 20 MG tablet Take 30 mg by mouth Daily.     • ezetimibe (ZETIA) 10 MG tablet Take 10 mg by mouth Daily.     • finasteride (PROSCAR) 5 MG tablet Take 5 mg by mouth Daily.     •  gabapentin (NEURONTIN) 300 MG capsule 300 mg Daily.     • hydrOXYzine pamoate (Vistaril) 50 MG capsule Take 1 capsule by mouth 3 (Three) Times a Day As Needed for Allergies for up to 14 days. 42 capsule 0   • insulin aspart (novoLOG FLEXPEN) 100 UNIT/ML solution pen-injector sc pen Inject 18 Units under the skin into the appropriate area as directed Every Morning. 16 units in the am, 14 units in the Pm     • insulin glargine (LANTUS, SEMGLEE) 100 UNIT/ML injection Inject 52 Units under the skin into the appropriate area as directed Daily.     • ketotifen (ZADITOR) 0.025 % ophthalmic solution 1 drop 2 (Two) Times a Day.     • LORazepam (ATIVAN) 2 MG tablet Take 2 mg by mouth 2 (Two) Times a Day.     • losartan (COZAAR) 50 MG tablet Take 50 mg by mouth 2 (Two) Times a Day.     • lovastatin (MEVACOR) 40 MG tablet Take 1 tablet by mouth Every Night for 30 days. 30 tablet 2   • montelukast (SINGULAIR) 10 MG tablet Take 10 mg by mouth Every Night.     • nitroglycerin (NITROSTAT) 0.4 MG SL tablet Place 0.4 mg under the tongue Every 5 (Five) Minutes As Needed for Chest Pain. Take no more than 3 doses in 15 minutes.     • omeprazole (priLOSEC) 40 MG capsule Take 40 mg by mouth Daily.     • simethicone (MYLICON) 80 MG chewable tablet Chew 80 mg Every 6 (Six) Hours As Needed for Flatulence.     • tamsulosin (FLOMAX) 0.4 MG capsule 24 hr capsule Take 1 capsule by mouth Daily.     • traMADol (ULTRAM) 50 MG tablet Take 50 mg by mouth Every 6 (Six) Hours As Needed. for pain     • triamcinolone (KENALOG) 0.1 % cream Apply  topically to the appropriate area as directed 2 (Two) Times a Day.       No facility-administered medications prior to visit.       Opioid medication/s are on active medication list.  and I have evaluated his active treatment plan and pain score trends (see table).  There were no vitals filed for this visit.  I have reviewed the chart for potential of high risk medication and harmful drug interactions in the  "elderly.            Aspirin is on active medication list. Aspirin use is indicated based on review of current medical condition/s. Pros and cons of this therapy have been discussed today. Benefits of this medication outweigh potential harm.  Patient has been encouraged to continue taking this medication.  .      Patient Active Problem List   Diagnosis   • Generalized weakness   • Chest pain   • Hypertension   • Coronary artery disease   • Diabetes mellitus (HCC)   • Chronic back pain   • Hypokalemia   • BPH (benign prostatic hyperplasia)   • Anxiety   • Mild episode of recurrent major depressive disorder (HCC)   • Chronic obstructive pulmonary disease, unspecified (HCC)   • Chronic neck pain   • Gastroesophageal reflux disease without esophagitis   • Eczema   • Other hyperlipidemia   • Nodule of right lung   • Chronic pain of both knees     Advance Care Planning  Advance Directive is not on file.  ACP discussion was held with the patient during this visit. Patient has an advance directive (not in EMR), copy requested.    Review of Systems   Constitutional: Negative.    HENT: Positive for rhinorrhea and sinus pressure.    Eyes: Negative.    Respiratory: Positive for shortness of breath.         R/t environmental/seasonal allergies per pt   Cardiovascular: Negative.    Gastrointestinal: Positive for constipation.        Pt taking senna   Endocrine: Negative.    Genitourinary: Negative.    Musculoskeletal: Positive for arthralgias.        Hx of arthritis   Skin: Negative.    Allergic/Immunologic: Positive for environmental allergies.   Neurological:        Pt saw cardio 9/28/2022 - no adjustment in b/p meds; BOWEN hose advised by cardiology   Hematological: Negative.    Psychiatric/Behavioral: Negative.         Objective    Vitals:    09/28/22 1302   BP: 160/71   Pulse: 71   Resp: 18   Temp: 97.7 °F (36.5 °C)   TempSrc: Temporal   SpO2: 98%   Weight: 105 kg (231 lb)   Height: 189.2 cm (74.5\")     Estimated body mass " "index is 29.26 kg/m² as calculated from the following:    Height as of this encounter: 189.2 cm (74.5\").    Weight as of this encounter: 105 kg (231 lb).    BMI is >= 25 and <30. (Overweight) The following options were offered after discussion;: weight loss educational material (shared in after visit summary)      Does the patient have evidence of cognitive impairment? No    Physical Exam  Vitals and nursing note reviewed.   Constitutional:       General: He is awake.      Appearance: Normal appearance.   HENT:      Head: Normocephalic.      Right Ear: Hearing, tympanic membrane, ear canal and external ear normal.      Left Ear: Hearing, tympanic membrane, ear canal and external ear normal.      Nose: Nose normal.      Mouth/Throat:      Lips: Pink.      Mouth: Mucous membranes are moist.      Pharynx: Oropharynx is clear.   Eyes:      General: Lids are normal.      Extraocular Movements: Extraocular movements intact.      Conjunctiva/sclera: Conjunctivae normal.      Pupils: Pupils are equal, round, and reactive to light.   Cardiovascular:      Rate and Rhythm: Normal rate and regular rhythm.      Pulses: Normal pulses.      Heart sounds: Normal heart sounds.   Pulmonary:      Effort: Pulmonary effort is normal.      Breath sounds: Normal breath sounds.   Abdominal:      General: Abdomen is protuberant. Bowel sounds are normal.      Palpations: Abdomen is soft.      Tenderness: There is no abdominal tenderness.   Musculoskeletal:         General: Normal range of motion.      Cervical back: Normal range of motion and neck supple.      Right lower leg: No edema.      Left lower leg: No edema.   Skin:     General: Skin is warm and dry.      Capillary Refill: Capillary refill takes less than 2 seconds.   Neurological:      Mental Status: He is alert and oriented to person, place, and time.      Cranial Nerves: Cranial nerves are intact.      Sensory: Sensation is intact.      Motor: Motor function is intact.      " Coordination: Coordination is intact.      Gait: Gait is intact.   Psychiatric:         Attention and Perception: Attention and perception normal.         Mood and Affect: Mood and affect normal.         Speech: Speech normal.         Behavior: Behavior normal. Behavior is cooperative.         Thought Content: Thought content normal.         Cognition and Memory: Cognition normal.         Judgment: Judgment normal.        Lab Results   Component Value Date    HGBA1C 8.3 08/26/2022            HEALTH RISK ASSESSMENT    Smoking Status:  Social History     Tobacco Use   Smoking Status Never Smoker   Smokeless Tobacco Current User   • Types: Chew     Alcohol Consumption:  Social History     Substance and Sexual Activity   Alcohol Use Never    Comment: Drank some years ago     Fall Risk Screen:    STEADI Fall Risk Assessment was completed, and patient is at HIGH risk for falls. Assessment completed on:5/26/2022    Depression Screening:  PHQ-2/PHQ-9 Depression Screening 5/26/2022   Retired PHQ-9 Total Score -   Retired Total Score -   Little Interest or Pleasure in Doing Things 0-->not at all   Feeling Down, Depressed or Hopeless 0-->not at all   PHQ-9: Brief Depression Severity Measure Score 0   Little interest or pleasure in doing things -   Feeling down, depressed, or hopeless -   Trouble falling or staying asleep, or sleeping too much -   Feeling tired or having little energy -   Poor appetite or overeating -   Feeling bad about yourself - or that you are a failure or have let yourself or your family down -   Trouble concentrating on things, such as reading the newspaper or watching television -   Moving or speaking so slowly that other people could have noticed. Or the opposite - being so fidgety or restless that you have been moving around a lot more than usual -   Thoughts that you would be better off dead, or of hurting yourself in some way -   How difficult have these problems made it for you to do your work, take  care of things at home, or get along with other people? -       Health Habits and Functional and Cognitive Screening:  Functional & Cognitive Status 9/28/2022   Do you have difficulty preparing food and eating? No   Do you have difficulty bathing yourself, getting dressed or grooming yourself? Yes   Do you have difficulty using the toilet? No   Do you have difficulty moving around from place to place? Yes   Do you have trouble with steps or getting out of a bed or a chair? Yes   Current Diet Low Carb Diet   Dental Exam Not up to date   Eye Exam Not up to date   Exercise (times per week) 5 times per week   Current Exercises Include Home Exercise Program (TV, Computer, Etc.)   Do you need help using the phone?  No   Are you deaf or do you have serious difficulty hearing?  Yes   Do you need help with transportation? Yes   Do you need help shopping? Yes   Do you need help preparing meals?  Yes   Do you need help with housework?  Yes   Do you need help with laundry? Yes   Do you need help taking your medications? Yes   Do you need help managing money? No   Do you ever drive or ride in a car without wearing a seat belt? No   Have you felt unusual stress, anger or loneliness in the last month? No   Who do you live with? Spouse   If you need help, do you have trouble finding someone available to you? No   Have you been bothered in the last four weeks by sexual problems? Yes   Do you have difficulty concentrating, remembering or making decisions? No       Age-appropriate Screening Schedule:  Refer to the list below for future screening recommendations based on patient's age, sex and/or medical conditions. Orders for these recommended tests are listed in the plan section. The patient has been provided with a written plan.    Health Maintenance   Topic Date Due   • ZOSTER VACCINE (1 of 2) 05/15/2023 (Originally 5/15/2000)   • DIABETIC EYE EXAM  06/05/2023 (Originally 6/7/2022)   • URINE MICROALBUMIN  06/12/2023 (Originally  4/5/2022)   • INFLUENZA VACCINE  10/01/2022   • HEMOGLOBIN A1C  02/26/2023   • LIPID PANEL  05/13/2023   • DIABETIC FOOT EXAM  05/26/2023   • TDAP/TD VACCINES (2 - Td or Tdap) 05/11/2031              Assessment & Plan   CMS Preventative Services Quick Reference  Risk Factors Identified During Encounter  Cardiovascular Disease  Chronic Pain   Obesity/Overweight   Polypharmacy  The above risks/problems have been discussed with the patient.  Follow up actions/plans if indicated are seen below in the Assessment/Plan Section.  Pertinent information has been shared with the patient in the After Visit Summary.    Diagnoses and all orders for this visit:    1. Encounter for subsequent annual wellness visit (AWV) in Medicare patient (Primary)  Comments:  Continue current medications as prescribed    2. Polyneuropathy  -     Diclofenac Sodium (VOLTAREN) 1 % gel gel; Apply 4 g topically to the appropriate area as directed 4 (Four) Times a Day As Needed (bilateral feet pain).  Dispense: 100 g; Refill: 1      Follow Up:   Return in about 3 months (around 12/28/2022) for Recheck fasting labs; htn.     An After Visit Summary and PPPS were made available to the patient.        I spent 30 minutes caring for Panda on this date of service. This time includes time spent by me in the following activities:preparing for the visit, reviewing tests, obtaining and/or reviewing a separately obtained history, performing a medically appropriate examination and/or evaluation , counseling and educating the patient/family/caregiver, ordering medications, tests, or procedures, referring and communicating with other health care professionals , documenting information in the medical record, independently interpreting results and communicating that information with the patient/family/caregiver and care coordination           This document has been electronically signed by ZACKARY Mora  September 28, 2022 13:57 EDT

## 2022-09-28 NOTE — PROGRESS NOTES
Problem list     Subjective   Panda Henry is a 72 y.o. male     Chief Complaint   Patient presents with   • Hypertension     LCR f/u   • Chest Pain   • Shortness of Breath   • Dizziness       HPI  The patient presents in the clinic today for routine evaluation and follow-up.  We had seen him to establish care at last evaluation.  Previous history included stenting to the LAD in 2020 by his report.  He had nonobstructive disease noted otherwise with medical management recommended for the same.  Because of symptoms and a CT of the chest findings (advanced diffuse calcification of the coronary system), cardiac evaluation was requested.  He was scheduled for stress and echo studies.  He has not had those studies yet.  He presents today for follow-up because of a recent evaluation to the emergency room for what is described as orthostasis.  He had borderline orthostatic changes by blood pressure checks at that time.  [facility]  He has now been treated with carvedilol and losartan therapy.  He feels that blood pressures have normalized and that his orthostatic changes have improved some.  Orthostatic changes are still persistent.  The patient has no further complaints otherwise.  He is still pending stress and echo studies for chest pain and shortness of air.    Current Outpatient Medications on File Prior to Visit   Medication Sig Dispense Refill   • albuterol sulfate  (90 Base) MCG/ACT inhaler Inhale 2 puffs Every 4 (Four) Hours As Needed for Wheezing.     • aspirin 81 MG EC tablet Take 81 mg by mouth Daily.     • budesonide-formoterol (SYMBICORT) 160-4.5 MCG/ACT inhaler Inhale 2 puffs 2 (Two) Times a Day for 30 days. 10.6 g 2   • carvedilol (COREG) 6.25 MG tablet Take 1 tablet by mouth 2 (Two) Times a Day With Meals. 60 tablet 11   • cetirizine (zyrTEC) 10 MG tablet Take 10 mg by mouth Daily.     • clopidogrel (PLAVIX) 75 MG tablet TAKE 1 TABLET  BY MOUTH EVERY DAY 90 tablet 0   • [facility] Gentle Laxative 5 MG EC tablet      • diclofenac (VOLTAREN) 75 MG EC tablet TAKE 1 TABLET BY MOUTH 2 TIMES A DAY 60 tablet 0   • DOCUSATE SODIUM PO Take  by mouth.     • doxepin (SINEquan) 100 MG capsule Take 100 mg by mouth Every Night.     • escitalopram (LEXAPRO) 20 MG tablet Take 30 mg by mouth Daily.     • ezetimibe (ZETIA) 10 MG tablet Take 10 mg by mouth Daily.     • finasteride (PROSCAR) 5 MG tablet Take 5 mg by mouth Daily.     • gabapentin (NEURONTIN) 300 MG capsule 300 mg Daily.     • hydrOXYzine pamoate (Vistaril) 50 MG capsule Take 1 capsule by mouth 3 (Three) Times a Day As Needed for Allergies for up to 14 days. 42 capsule 0   • insulin aspart (novoLOG FLEXPEN) 100 UNIT/ML solution pen-injector sc pen Inject 18 Units under the skin into the appropriate area as directed Every Morning. 16 units in the am, 14 units in the Pm     • insulin glargine (LANTUS, SEMGLEE) 100 UNIT/ML injection Inject 52 Units under the skin into the appropriate area as directed Daily.     • ketotifen (ZADITOR) 0.025 % ophthalmic solution 1 drop 2 (Two) Times a Day.     • LORazepam (ATIVAN) 2 MG tablet Take 2 mg by mouth 2 (Two) Times a Day.     • losartan (COZAAR) 50 MG tablet Take 50 mg by mouth 2 (Two) Times a Day.     • lovastatin (MEVACOR) 40 MG tablet Take 1 tablet by mouth Every Night for 30 days. 30 tablet 2   • montelukast (SINGULAIR) 10 MG tablet Take 10 mg by mouth Every Night.     • nitroglycerin (NITROSTAT) 0.4 MG SL tablet Place 0.4 mg under the tongue Every 5 (Five) Minutes As Needed for Chest Pain. Take no more than 3 doses in 15 minutes.     • omeprazole (priLOSEC) 40 MG capsule Take 40 mg by mouth Daily.     • simethicone (MYLICON) 80 MG chewable tablet Chew 80 mg Every 6 (Six) Hours As Needed for Flatulence.     • tamsulosin (FLOMAX) 0.4 MG capsule 24 hr capsule Take 1 capsule by mouth Daily.     • traMADol (ULTRAM) 50 MG tablet Take 50 mg by mouth Every 6 (Six) Hours As  Needed. for pain     • triamcinolone (KENALOG) 0.1 % cream Apply  topically to the appropriate area as directed 2 (Two) Times a Day.       No current facility-administered medications on file prior to visit.       Dust mite extract, Grass, Molds & smuts, and Pollen extract    Past Medical History:   Diagnosis Date   • Arthritis    • Cancer (HCC)     basal cell carcinoma   • COPD (chronic obstructive pulmonary disease) (HCC)    • Coronary artery disease    • Depression    • Diabetes mellitus (HCC)    • GERD (gastroesophageal reflux disease)    • Hyperlipidemia    • Hypertension    • Injury of back    • Neuropathy    • Spinal stenosis        Social History     Socioeconomic History   • Marital status:    Tobacco Use   • Smoking status: Never Smoker   • Smokeless tobacco: Current User     Types: Chew   Vaping Use   • Vaping Use: Never used   Substance and Sexual Activity   • Alcohol use: Never     Comment: Drank some years ago   • Drug use: Never   • Sexual activity: Defer       Family History   Problem Relation Age of Onset   • Diabetes Mother    • Stroke Mother    • Coronary artery disease Mother    • Lung cancer Father    • Coronary artery disease Father    • Heart failure Sister    • Coronary artery disease Brother    • Aneurysm Brother         brain   • Cancer Brother         unknown location   • Alcohol abuse Brother        Review of Systems   Constitutional: Positive for fatigue. Negative for chills, diaphoresis and fever.   HENT:        Sinus issues   Eyes: Positive for visual disturbance (blurry vision in the monrings).   Respiratory: Positive for apnea (unable to use cpap), cough (at night) and shortness of breath (with increased activity and bending over). Negative for chest tightness and wheezing.    Cardiovascular: Positive for chest pain (feels due to arthritis across ribs). Negative for palpitations and leg swelling.   Gastrointestinal: Positive for constipation and diarrhea. Negative for  "abdominal pain, blood in stool, nausea and vomiting.   Endocrine: Negative.    Genitourinary: Negative.  Negative for hematuria.   Musculoskeletal: Positive for arthralgias, back pain (pinched nerves, DD ), myalgias and neck pain.   Skin: Negative.    Allergic/Immunologic: Positive for environmental allergies.   Neurological: Positive for dizziness (with position change), weakness and numbness (from nerve damage, in fingers and feet). Negative for syncope, light-headedness and headaches.   Hematological: Bruises/bleeds easily.   Psychiatric/Behavioral: Negative.  Negative for sleep disturbance.       Objective   Vitals:    09/28/22 1033 09/28/22 1043 09/28/22 1044 09/28/22 1045   BP: 164/80 160/85 155/84 131/72   BP Location: Left arm Right arm Right arm Right arm   Patient Position: Sitting Lying Sitting Standing   Pulse: 72 69 72 79   SpO2: 98%      Weight: 104 kg (230 lb)      Height: 189.2 cm (74.5\")         /72 (BP Location: Right arm, Patient Position: Standing)   Pulse 79   Ht 189.2 cm (74.5\")   Wt 104 kg (230 lb)   SpO2 98%   BMI 29.14 kg/m²    Lab Results (most recent)     None        Physical Exam  Vitals and nursing note reviewed.   Constitutional:       General: He is not in acute distress.     Appearance: He is well-developed.   HENT:      Head: Normocephalic and atraumatic.   Eyes:      Conjunctiva/sclera: Conjunctivae normal.      Pupils: Pupils are equal, round, and reactive to light.   Neck:      Vascular: No JVD.      Trachea: No tracheal deviation.   Cardiovascular:      Rate and Rhythm: Normal rate and regular rhythm.      Heart sounds: Normal heart sounds.   Pulmonary:      Effort: Pulmonary effort is normal.      Breath sounds: Normal breath sounds.   Abdominal:      General: Bowel sounds are normal. There is no distension.      Palpations: Abdomen is soft. There is no mass.      Tenderness: There is no abdominal tenderness. There is no guarding or rebound.   Musculoskeletal:         " General: No tenderness or deformity. Normal range of motion.      Cervical back: Normal range of motion and neck supple.   Skin:     General: Skin is warm and dry.      Coloration: Skin is not pale.      Findings: No erythema or rash.   Neurological:      Mental Status: He is alert and oriented to person, place, and time.   Psychiatric:         Behavior: Behavior normal.         Thought Content: Thought content normal.         Judgment: Judgment normal.           Procedure   Procedures       Assessment & Plan      Diagnosis Plan   1. Coronary artery disease involving native coronary artery of native heart with angina pectoris (HCC)     2. Orthostasis  Compression Stockings   3. Shortness of breath     4. Precordial pain       1.  The patient presents for ER follow-up.  He was recently evaluated for orthostatic changes.  He had reproduction of symptoms during orthostatic blood pressure check with borderline changes noted by report.  Antihypertensive therapies have been adjusted by his report and he does feel some improved.  I would like to attempt compression hose therapy for ongoing orthostatic changes.  As his blood pressures are fairly well treated on current medical regimen and symptoms have improved, I would not adjust regimen at this time.    2.  He is pending evaluation in the next couple of weeks through the clinic with a stress test and an echocardiogram given known coronary artery disease history and ongoing symptoms of chest pain and dyspnea.  We will await results of those.  He feels that symptoms are stable enough to wait through for testing.    3.  We will make no adjustments as above.  We will see him as soon as we have results of the above studies.  He will report response to orthostatic compression hose therapy.  He will call back immediately for any issues.           Panda Henry  reports that he has never smoked. His smokeless tobacco use includes chew.. I have educated him on the risk of diseases  from using tobacco products such as cancer, COPD and heart disease.     I advised him to quit and he is not willing to quit.    I spent 3  minutes counseling the patient.        Advance Care Planning   ACP discussion was held with the patient during this visit. Patient has an advance directive in EMR which is still valid.                 Electronically signed by:

## 2022-10-10 ENCOUNTER — TELEPHONE (OUTPATIENT)
Dept: CARDIOLOGY | Facility: CLINIC | Age: 72
End: 2022-10-10

## 2022-10-10 DIAGNOSIS — I10 PRIMARY HYPERTENSION: Primary | ICD-10-CM

## 2022-10-10 RX ORDER — CARVEDILOL 6.25 MG/1
6.25 TABLET ORAL 2 TIMES DAILY WITH MEALS
Qty: 180 TABLET | Refills: 3 | Status: ON HOLD | OUTPATIENT
Start: 2022-10-10 | End: 2022-11-30

## 2022-10-10 RX ORDER — IRBESARTAN 300 MG/1
300 TABLET ORAL DAILY
Qty: 90 TABLET | Refills: 3 | Status: SHIPPED | OUTPATIENT
Start: 2022-10-10 | End: 2022-11-23 | Stop reason: DRUGHIGH

## 2022-10-10 NOTE — TELEPHONE ENCOUNTER
HOME B/P LOG REVIEWED BY NITA FREITAS WITH RECOMMENDATIONS TO CHANGE LOSARTAN TO IRBESARTAN 300 MG PO BID (WRITTEN ON B/P LOG SHEET) IF STILL HYPERTENSIVE PER HOME READINGS. IRBESARTAN 300 MG USUALLY ONCE DAILY NOT BID SO DISCUSSED WITH NITA BRAN AND V/O RECEIVED TO CHANGE  MG DAILY.  CALLED AND SPOKE WITH WIFE SINCE PATIENT STILL ASLEEP. STATES B/P'S AT HOME STILL RUNNING SAME AND OK WITH CHANGING MEDS. AWARE TO D/C LOSARTAN AND SENDING IN IRBESARTAN 300 MG PO DAILY, TO MONITOR B/P FOR NEXT 2-3 WEEKS AND CALL READINGS TO OUR OFFICE. VERBALIZED. PH,LPN

## 2022-10-19 RX ORDER — DICLOFENAC SODIUM 75 MG/1
TABLET, DELAYED RELEASE ORAL
Qty: 60 TABLET | Refills: 0 | Status: SHIPPED | OUTPATIENT
Start: 2022-10-19 | End: 2022-11-21

## 2022-10-25 ENCOUNTER — APPOINTMENT (OUTPATIENT)
Dept: CARDIOLOGY | Facility: HOSPITAL | Age: 72
End: 2022-10-25

## 2022-10-25 ENCOUNTER — HOSPITAL ENCOUNTER (OUTPATIENT)
Dept: CARDIOLOGY | Facility: HOSPITAL | Age: 72
Discharge: HOME | End: 2022-10-25

## 2022-10-25 DIAGNOSIS — I25.119 CORONARY ARTERY DISEASE INVOLVING NATIVE CORONARY ARTERY OF NATIVE HEART WITH ANGINA PECTORIS: ICD-10-CM

## 2022-10-25 DIAGNOSIS — R07.2 PRECORDIAL PAIN: ICD-10-CM

## 2022-10-25 DIAGNOSIS — R06.02 SHORTNESS OF BREATH: ICD-10-CM

## 2022-10-25 DIAGNOSIS — I10 PRIMARY HYPERTENSION: ICD-10-CM

## 2022-10-25 PROCEDURE — 93306 TTE W/DOPPLER COMPLETE: CPT | Performed by: INTERNAL MEDICINE

## 2022-10-25 PROCEDURE — A9500 TC99M SESTAMIBI: HCPCS | Performed by: INTERNAL MEDICINE

## 2022-10-25 PROCEDURE — 78452 HT MUSCLE IMAGE SPECT MULT: CPT | Performed by: INTERNAL MEDICINE

## 2022-10-25 PROCEDURE — 78452 HT MUSCLE IMAGE SPECT MULT: CPT

## 2022-10-25 PROCEDURE — 93017 CV STRESS TEST TRACING ONLY: CPT

## 2022-10-25 PROCEDURE — 25010000002 REGADENOSON 0.4 MG/5ML SOLUTION: Performed by: PHYSICIAN ASSISTANT

## 2022-10-25 PROCEDURE — 0 TECHNETIUM SESTAMIBI: Performed by: INTERNAL MEDICINE

## 2022-10-25 PROCEDURE — 93306 TTE W/DOPPLER COMPLETE: CPT

## 2022-10-25 PROCEDURE — 93018 CV STRESS TEST I&R ONLY: CPT | Performed by: INTERNAL MEDICINE

## 2022-10-25 RX ADMIN — TECHNETIUM TC 99M SESTAMIBI 1 DOSE: 1 INJECTION INTRAVENOUS at 11:10

## 2022-10-25 RX ADMIN — TECHNETIUM TC 99M SESTAMIBI 1 DOSE: 1 INJECTION INTRAVENOUS at 15:02

## 2022-10-25 RX ADMIN — REGADENOSON 0.4 MG: 0.08 INJECTION, SOLUTION INTRAVENOUS at 14:07

## 2022-10-27 LAB
BH CV REST NUCLEAR ISOTOPE DOSE: 10 MCI
BH CV STRESS COMMENTS STAGE 1: NORMAL
BH CV STRESS DOSE REGADENOSON STAGE 1: 0.4
BH CV STRESS DURATION MIN STAGE 1: 0
BH CV STRESS DURATION SEC STAGE 1: 10
BH CV STRESS NUCLEAR ISOTOPE DOSE: 30 MCI
BH CV STRESS PROTOCOL 1: NORMAL
BH CV STRESS RECOVERY BP: NORMAL MMHG
BH CV STRESS RECOVERY HR: 85 BPM
BH CV STRESS STAGE 1: 1
MAXIMAL PREDICTED HEART RATE: 148 BPM
PERCENT MAX PREDICTED HR: 62.84 %
STRESS BASELINE BP: NORMAL MMHG
STRESS BASELINE HR: 64 BPM
STRESS PERCENT HR: 74 %
STRESS POST PEAK BP: NORMAL MMHG
STRESS POST PEAK HR: 93 BPM
STRESS TARGET HR: 126 BPM

## 2022-10-31 ENCOUNTER — TELEPHONE (OUTPATIENT)
Dept: CARDIOLOGY | Facility: CLINIC | Age: 72
End: 2022-10-31

## 2022-10-31 NOTE — TELEPHONE ENCOUNTER
----- Message from LAURA Pineda sent at 10/28/2022 12:57 PM EDT -----  Follow-up 2 to 3 weeks.  ----- Message -----  From: Vadim Centeno MD  Sent: 10/27/2022   8:56 AM EDT  To: LAURA Pineda

## 2022-11-01 ENCOUNTER — OFFICE VISIT (OUTPATIENT)
Dept: CARDIOLOGY | Facility: CLINIC | Age: 72
End: 2022-11-01

## 2022-11-01 VITALS
HEIGHT: 74 IN | SYSTOLIC BLOOD PRESSURE: 165 MMHG | WEIGHT: 226.6 LBS | OXYGEN SATURATION: 99 % | DIASTOLIC BLOOD PRESSURE: 82 MMHG | BODY MASS INDEX: 29.08 KG/M2 | HEART RATE: 64 BPM

## 2022-11-01 DIAGNOSIS — R07.2 PRECORDIAL PAIN: ICD-10-CM

## 2022-11-01 DIAGNOSIS — I10 PRIMARY HYPERTENSION: ICD-10-CM

## 2022-11-01 DIAGNOSIS — R60.0 BILATERAL LOWER EXTREMITY EDEMA: ICD-10-CM

## 2022-11-01 DIAGNOSIS — R94.39 POSITIVE CARDIAC STRESS TEST: Primary | ICD-10-CM

## 2022-11-01 DIAGNOSIS — I25.119 CORONARY ARTERY DISEASE INVOLVING NATIVE CORONARY ARTERY OF NATIVE HEART WITH ANGINA PECTORIS: ICD-10-CM

## 2022-11-01 PROCEDURE — 99214 OFFICE O/P EST MOD 30 MIN: CPT | Performed by: NURSE PRACTITIONER

## 2022-11-01 RX ORDER — DIPHENHYDRAMINE HCL 25 MG
TABLET ORAL
Qty: 2 TABLET | Refills: 0 | Status: SHIPPED | OUTPATIENT
Start: 2022-11-01 | End: 2022-12-06

## 2022-11-01 RX ORDER — FAMOTIDINE 20 MG/1
TABLET, FILM COATED ORAL
Qty: 2 TABLET | Refills: 0 | Status: SHIPPED | OUTPATIENT
Start: 2022-11-01 | End: 2022-12-06

## 2022-11-01 RX ORDER — PREDNISONE 20 MG/1
TABLET ORAL
Qty: 9 TABLET | Refills: 0 | Status: SHIPPED | OUTPATIENT
Start: 2022-11-01 | End: 2022-12-06

## 2022-11-01 NOTE — PROGRESS NOTES
Subjective     Panda Henry is a 72 y.o. male who presents to day for abnormal stress .    CHIEF COMPLIANT  Chief Complaint   Patient presents with   • abnormal stress        Active Problems:  Problem List Items Addressed This Visit        Cardiac and Vasculature    Chest pain    Relevant Orders    [facility]    CBC & Differential    Basic Metabolic Panel    Lipid Panel    Hypertension    Relevant Orders    [facility]    CBC & Differential    Basic Metabolic Panel    Lipid Panel    Coronary artery disease    Relevant Orders    [facility]    CBC & Differential    Basic Metabolic Panel    Lipid Panel   Other Visit Diagnoses     Positive cardiac stress test    -  Primary    Relevant Orders    [facility]    CBC & Differential    Basic Metabolic Panel    Lipid Panel    Bilateral lower extremity edema        Relevant Orders    [facility]    CBC & Differential    Basic Metabolic Panel    Lipid Panel      Problem List :  • Hypertension       [facility] f/u   • Chest Pain   • Shortness of Breath   • Dizziness   Problem list  1.  Hypertension  2.  Chest pain  2.1 stress test 10/22: Fixed anterior wall defect compatible with infarct or diaphragmatic attenuation additionally there is a moderately sized moderately dense but incompletely reversible inferior lateral wall defect compatible with ischemia with a mild anterior septal hypokinesis with a post-rest ejection fraction of 50%  3.  Shortness of breath  4.  Lower extremity edema      HPI  HPI    Panda Arora, 72-year-old male patient being followed up today for abnormal stress test with chest pain. Patient did undergo a stress test that identified a fixed inferior wall defect compatible with infarct or diaphragmatic attenuation. Additionally, there is a moderately sized, moderately dense, but incompletely reversible inferolateral wall defect compatible with ischemia, post stress ejection fraction 50 percent with mild anteroseptal  hypokinesis. Elevated transient ischemic dilation ratio of 1.26.     His blood pressure today is elevated at 165/82 mmHg, heart rate of 64 BPM.  He reports that his blood pressure has been elevated recently. Last night his blood pressure was 186/91 mmHg. Currently, he takes irbesartan and carvedilol for his hypertension. He takes carvedilol 1.5 tablets 2 times daily. When he was taking 2 tablets 2 times daily, he was experiencing dizziness due to his blood pressure dropping.      His current medications include irbesartan, hydroxyzine, gabapentin, furosemide, Zetia, and carvedilol.      In the past, the patient has been diagnosed with a heart murmur. He also had a history of stenting to the LAD. He states LAURA Cox thought he might have had another blockage due to his blood pressure being uncontrolled recently. In the past, he has also had a heart catheterization done at [facility] in [place] 2 or 3 years ago.      Last night the patient experience angina that was in the middle of the chest and to the left. When he mashed down on his ribs the angina subsided. He states he normally experiences angina while sitting. He denies any symptoms that accompany his angina. His episodes of angina last about 30 seconds, and occur once weekly. He describes his pain as a dull ache. He does not have to take nitroglycerin for his episodes of angina.      He experiences edema in his lower extremities, as well as his arms and hands. Yesterday, he woke up with edema in his bilateral arms and bilateral legs and feet. He states this is the first time he has experienced this. He will experience bilateral hand numbness when he stands.     He denies any shortness of breath, palpitations, syncope, PND, orthopnea, or strokelike symptoms.      PRIOR MEDS  Current Outpatient Medications on File Prior to Visit   Medication Sig Dispense Refill   • albuterol sulfate  (90 Base) MCG/ACT inhaler Inhale 2 puffs Every 4  (Four) Hours As Needed for Wheezing.     • aspirin 81 MG EC tablet Take 81 mg by mouth Daily.     • budesonide-formoterol (SYMBICORT) 160-4.5 MCG/ACT inhaler Inhale 2 puffs 2 (Two) Times a Day for 30 days. 10.6 g 2   • carvedilol (COREG) 6.25 MG tablet Take 1 tablet by mouth 2 (Two) Times a Day With Meals. (Patient taking differently: Take 1 tablet by mouth 2 (Two) Times a Day With Meals. 1 1/2 tab po bid) 180 tablet 3   • cetirizine (zyrTEC) 10 MG tablet Take 10 mg by mouth Daily.     • [facility] Gentle Laxative 5 MG EC tablet      • diclofenac (VOLTAREN) 75 MG EC tablet TAKE 1 TABLET BY MOUTH TWICE A DAY 60 tablet 0   • Diclofenac Sodium (VOLTAREN) 1 % gel gel Apply 4 g topically to the appropriate area as directed 4 (Four) Times a Day As Needed (bilateral feet pain). 100 g 1   • DOCUSATE SODIUM PO Take  by mouth.     • doxepin (SINEquan) 100 MG capsule Take 100 mg by mouth Every Night.     • escitalopram (LEXAPRO) 20 MG tablet Take 1 tablet by mouth Daily.     • ezetimibe (ZETIA) 10 MG tablet Take 10 mg by mouth Daily.     • finasteride (PROSCAR) 5 MG tablet Take 5 mg by mouth Daily.     • gabapentin (NEURONTIN) 300 MG capsule 300 mg Daily.     • hydrOXYzine pamoate (Vistaril) 50 MG capsule Take 1 capsule by mouth 3 (Three) Times a Day As Needed for Allergies for up to 14 days. 42 capsule 0   • insulin aspart (novoLOG FLEXPEN) 100 UNIT/ML solution pen-injector sc pen Inject 18 Units under the skin into the appropriate area as directed Every Morning. 16 units in the am, 14 units in the Pm     • insulin glargine (LANTUS, SEMGLEE) 100 UNIT/ML injection Inject 52 Units under the skin into the appropriate area as directed Daily.     • irbesartan (Avapro) 300 MG tablet Take 1 tablet by mouth Daily. 90 tablet 3   • ketotifen (ZADITOR) 0.025 % ophthalmic solution 1 drop 2 (Two) Times a Day.     • LORazepam (ATIVAN) 2 MG tablet Take 2 mg by mouth 2 (Two) Times a Day.     • lovastatin (MEVACOR) 40 MG tablet Take 1 tablet by  mouth Every Night for 30 days. 30 tablet 2   • montelukast (SINGULAIR) 10 MG tablet Take 10 mg by mouth Every Night.     • nitroglycerin (NITROSTAT) 0.4 MG SL tablet Place 0.4 mg under the tongue Every 5 (Five) Minutes As Needed for Chest Pain. Take no more than 3 doses in 15 minutes.     • omeprazole (priLOSEC) 40 MG capsule Take 40 mg by mouth Daily.     • simethicone (MYLICON) 80 MG chewable tablet Chew 80 mg Every 6 (Six) Hours As Needed for Flatulence.     • tamsulosin (FLOMAX) 0.4 MG capsule 24 hr capsule Take 1 capsule by mouth Daily.     • traMADol (ULTRAM) 50 MG tablet Take 50 mg by mouth Every 6 (Six) Hours As Needed. for pain     • triamcinolone (KENALOG) 0.1 % cream Apply  topically to the appropriate area as directed 2 (Two) Times a Day.     • clopidogrel (PLAVIX) 75 MG tablet TAKE 1 TABLET BY MOUTH EVERY DAY 90 tablet 0     No current facility-administered medications on file prior to visit.       ALLERGIES  Dust mite extract, Grass, Molds & smuts, and Pollen extract    HISTORY  Past Medical History:   Diagnosis Date   • Arthritis    • Cancer (HCC)     basal cell carcinoma   • COPD (chronic obstructive pulmonary disease) (HCC)    • Coronary artery disease    • Depression    • Diabetes mellitus (HCC)    • GERD (gastroesophageal reflux disease)    • Hyperlipidemia    • Hypertension    • Injury of back    • Neuropathy    • Spinal stenosis        Social History     Socioeconomic History   • Marital status:    Tobacco Use   • Smoking status: Never   • Smokeless tobacco: Current     Types: Chew   Vaping Use   • Vaping Use: Never used   Substance and Sexual Activity   • Alcohol use: Never     Comment: Drank some years ago   • Drug use: Never   • Sexual activity: Defer       Family History   Problem Relation Age of Onset   • Diabetes Mother    • Stroke Mother    • Coronary artery disease Mother    • Lung cancer Father    • Coronary artery disease Father    • Heart failure Sister    • Coronary artery  "disease Brother    • Aneurysm Brother         brain   • Cancer Brother         unknown location   • Alcohol abuse Brother        Review of Systems   Constitutional: Positive for fatigue. Negative for chills and fever.   HENT: Positive for rhinorrhea. Negative for congestion and sore throat.    Respiratory: Negative for chest tightness and shortness of breath.    Cardiovascular: Positive for chest pain (had some yesterday felt more like rib pain ) and leg swelling (yesterday both legs and feet arms and hands were swollen ). Negative for palpitations.   Gastrointestinal: Positive for constipation. Negative for diarrhea and nausea.   Musculoskeletal: Positive for arthralgias, back pain, myalgias and neck pain.   Allergic/Immunologic: Positive for environmental allergies. Negative for food allergies.   Neurological: Positive for dizziness (getting up ), weakness and light-headedness. Negative for syncope.   Hematological: Bruises/bleeds easily.   Psychiatric/Behavioral: Negative for sleep disturbance.       Objective     VITALS: /82 (BP Location: Left arm, Patient Position: Sitting)   Pulse 64   Ht 189.2 cm (74.49\")   Wt 103 kg (226 lb 9.6 oz)   SpO2 99%   BMI 28.71 kg/m²     LABS:   Lab Results (most recent)     None          IMAGING:   XR Abdomen Flat & Upright (In Office)    Result Date: 8/26/2022    Calcifications noted in the left hemipelvis are probably vascular phleboliths.  This report was finalized on 8/26/2022 2:57 PM by Dr. Panda Salinas MD.        EXAM:  Physical Exam  Vitals and nursing note reviewed.   Constitutional:       Appearance: He is well-developed.   HENT:      Head: Normocephalic and atraumatic.   Eyes:      Pupils: Pupils are equal, round, and reactive to light.   Neck:      Vascular: No carotid bruit or JVD.   Cardiovascular:      Rate and Rhythm: Normal rate and regular rhythm.      Pulses:           Carotid pulses are 2+ on the right side and 2+ on the left side.       Radial " pulses are 2+ on the right side and 2+ on the left side.        Posterior tibial pulses are 2+ on the right side and 2+ on the left side.      Heart sounds: Normal heart sounds. No murmur heard.    No gallop.   Pulmonary:      Effort: Pulmonary effort is normal. No respiratory distress.      Breath sounds: Normal breath sounds.   Abdominal:      General: Bowel sounds are normal. There is no distension.      Palpations: Abdomen is soft.      Tenderness: There is no abdominal tenderness.   Musculoskeletal:         General: No swelling. Normal range of motion.      Cervical back: Neck supple.   Skin:     General: Skin is warm and dry.   Neurological:      Mental Status: He is alert and oriented to person, place, and time.      Cranial Nerves: No cranial nerve deficit.      Sensory: No sensory deficit.   Psychiatric:         Speech: Speech normal.         Behavior: Behavior normal.         Thought Content: Thought content normal.         Judgment: Judgment normal.         Procedure   Procedures       Assessment & Plan    Diagnosis Plan   1. Positive cardiac stress test  [facility]    CBC & Differential    Basic Metabolic Panel    Lipid Panel      2. Precordial pain  [facility]    CBC & Differential    Basic Metabolic Panel    Lipid Panel      3. Coronary artery disease involving native coronary artery of native heart with angina pectoris (HCC)  [facility]    CBC & Differential    Basic Metabolic Panel    Lipid Panel      4. Primary hypertension  [facility]    CBC & Differential    Basic Metabolic Panel    Lipid Panel      5. Bilateral lower extremity edema  [facility]    CBC & Differential    Basic Metabolic Panel    Lipid Panel        1. The patient's recent positive stress test was discussed at length during this visit with possible further evaluation of ischemia options.. The patient has opted to move forth with a left heart catheterization. The benefits and risks of  the heart catheterization were explained and the patient wishes to continue.    We will also have patient have labs drawn proximately 1 week prior to left heart catheterization for further risk stratification.  2. A lipid panel will be added to the patient's labs that will obtained prior to his heart catheterization.  3.  At this time due to patient's blood pressure dropping when he gets up.  We will avoid further titration of his blood pressure medications.  However he will continue to monitor his blood pressure on a routine basis.  If he continues to have fluctuations of blood pressure including extreme highs we will have to further treat him for hypertension.  4.  Informed of signs and symptoms of ACS and advised to seek emergent treatment for any new worsening symptoms.  Patient also advised sooner follow-up as needed.  Also advised to follow-up with family doctor as needed  This note is dictated utilizing voice recognition software.  Although this record has been proof read, transcriptional errors may still be present. If questions occur regarding the content of this record please do not hesitate to call our office.  I have reviewed and confirmed the accuracy of the ROS as documented by the MA/LICON/RN ZACKARY Abbott      Return if symptoms worsen or fail to improve, for cath follow up 1-2 weeks.    Diagnoses and all orders for this visit:    1. Positive cardiac stress test (Primary)  -     [facility] Cath; Future  -     CBC & Differential; Future  -     Basic Metabolic Panel; Future  -     Lipid Panel; Future    2. Precordial pain  -     [facility] Cath; Future  -     CBC & Differential; Future  -     Basic Metabolic Panel; Future  -     Lipid Panel; Future    3. Coronary artery disease involving native coronary artery of native heart with angina pectoris (HCC)  -     [facility] Cath; Future  -     CBC & Differential; Future  -     Basic Metabolic Panel; Future  -     Lipid Panel;  Future    4. Primary hypertension  -     [facility] Cath; Future  -     CBC & Differential; Future  -     Basic Metabolic Panel; Future  -     Lipid Panel; Future    5. Bilateral lower extremity edema  -     [facility] Cath; Future  -     CBC & Differential; Future  -     Basic Metabolic Panel; Future  -     Lipid Panel; Future        Panda Henry  reports that he has never smoked. His smokeless tobacco use includes chew.. I have educated him on the risk of diseases from using tobacco products.       Advance Care Planning   ACP discussion was held with the patient during this visit. Patient has an advance directive (not in EMR), copy requested.       MEDS ORDERED DURING VISIT:  No orders of the defined types were placed in this encounter.          This document has been electronically signed by ZACKARY Abbott Jr.  November 1, 2022 10:37 EDT      Transcribed from ambient dictation for ZACKARY Abbott by Cecilia Dorantes [employer] .  11/01/22   12:55 EDT    Patient or patient representative verbalized consent to the visit recording.  I have personally performed the services described in this document as transcribed by the above individual, and it is both accurate and complete.

## 2022-11-13 LAB
BH CV ECHO MEAS - ACS: 1.61 CM
BH CV ECHO MEAS - AO MAX PG: 6.8 MMHG
BH CV ECHO MEAS - AO MEAN PG: 4 MMHG
BH CV ECHO MEAS - AO ROOT DIAM: 3.1 CM
BH CV ECHO MEAS - AO V2 MAX: 130.3 CM/SEC
BH CV ECHO MEAS - AO V2 VTI: 30.3 CM
BH CV ECHO MEAS - EDV(CUBED): 72.5 ML
BH CV ECHO MEAS - EDV(MOD-SP4): 95.9 ML
BH CV ECHO MEAS - EF(MOD-SP4): 55.3 %
BH CV ECHO MEAS - ESV(CUBED): 12.5 ML
BH CV ECHO MEAS - ESV(MOD-SP4): 42.9 ML
BH CV ECHO MEAS - FS: 44.4 %
BH CV ECHO MEAS - IVS/LVPW: 0.99 CM
BH CV ECHO MEAS - IVSD: 1.48 CM
BH CV ECHO MEAS - LA DIMENSION: 3.3 CM
BH CV ECHO MEAS - LAT PEAK E' VEL: 7.7 CM/SEC
BH CV ECHO MEAS - LV DIASTOLIC VOL/BSA (35-75): 41.5 CM2
BH CV ECHO MEAS - LV MASS(C)D: 243.1 GRAMS
BH CV ECHO MEAS - LV SYSTOLIC VOL/BSA (12-30): 18.6 CM2
BH CV ECHO MEAS - LVIDD: 4.2 CM
BH CV ECHO MEAS - LVIDS: 2.32 CM
BH CV ECHO MEAS - LVOT AREA: 3.5 CM2
BH CV ECHO MEAS - LVOT DIAM: 2.11 CM
BH CV ECHO MEAS - LVPWD: 1.49 CM
BH CV ECHO MEAS - MED PEAK E' VEL: 4.7 CM/SEC
BH CV ECHO MEAS - MV A MAX VEL: 94.7 CM/SEC
BH CV ECHO MEAS - MV DEC SLOPE: 282.9 CM/SEC2
BH CV ECHO MEAS - MV E MAX VEL: 83.6 CM/SEC
BH CV ECHO MEAS - MV E/A: 0.88
BH CV ECHO MEAS - RVDD: 3.2 CM
BH CV ECHO MEAS - SI(MOD-SP4): 22.9 ML/M2
BH CV ECHO MEAS - SV(MOD-SP4): 53 ML
BH CV ECHO MEASUREMENTS AVERAGE E/E' RATIO: 13.48
MAXIMAL PREDICTED HEART RATE: 148 BPM
STRESS TARGET HR: 126 BPM

## 2022-11-17 ENCOUNTER — TELEPHONE (OUTPATIENT)
Dept: CARDIOLOGY | Facility: CLINIC | Age: 72
End: 2022-11-17

## 2022-11-17 NOTE — TELEPHONE ENCOUNTER
ECHO  Pt notified of no acute findings. Provider will discuss results at f/u. Pt reminded of appt date and time.  ----- Message from Kathi Woodall MA sent at 11/15/2022  9:31 AM EST -----    ----- Message -----  From: Paul Reeves PA  Sent: 11/15/2022   8:52 AM EST  To: Kathi Woodall MA    Routine follow-up.  ----- Message -----  From: Vadim Centeno MD  Sent: 11/13/2022  12:15 PM EST  To: LAURA Pineda

## 2022-11-21 RX ORDER — DICLOFENAC SODIUM 75 MG/1
TABLET, DELAYED RELEASE ORAL
Qty: 60 TABLET | Refills: 0 | Status: ON HOLD | OUTPATIENT
Start: 2022-11-21 | End: 2022-11-30

## 2022-11-22 PROBLEM — R60.0 BILATERAL LOWER EXTREMITY EDEMA: Status: ACTIVE | Noted: 2022-11-22

## 2022-11-22 PROBLEM — R94.39 POSITIVE CARDIAC STRESS TEST: Status: ACTIVE | Noted: 2022-11-22

## 2022-11-23 ENCOUNTER — TELEPHONE (OUTPATIENT)
Dept: CARDIOLOGY | Facility: CLINIC | Age: 72
End: 2022-11-23

## 2022-11-23 ENCOUNTER — PREP FOR SURGERY (OUTPATIENT)
Dept: OTHER | Facility: HOSPITAL | Age: 72
End: 2022-11-23

## 2022-11-23 DIAGNOSIS — I10 PRIMARY HYPERTENSION: ICD-10-CM

## 2022-11-23 RX ORDER — SODIUM CHLORIDE 9 MG/ML
40 INJECTION, SOLUTION INTRAVENOUS AS NEEDED
Status: CANCELLED | OUTPATIENT
Start: 2022-11-23

## 2022-11-23 RX ORDER — SODIUM CHLORIDE 0.9 % (FLUSH) 0.9 %
10 SYRINGE (ML) INJECTION EVERY 12 HOURS SCHEDULED
Status: CANCELLED | OUTPATIENT
Start: 2022-11-23

## 2022-11-23 RX ORDER — ASPIRIN 81 MG/1
81 TABLET ORAL DAILY
Status: CANCELLED | OUTPATIENT
Start: 2022-11-24

## 2022-11-23 RX ORDER — SODIUM CHLORIDE 0.9 % (FLUSH) 0.9 %
10 SYRINGE (ML) INJECTION AS NEEDED
Status: CANCELLED | OUTPATIENT
Start: 2022-11-23

## 2022-11-23 RX ORDER — IRBESARTAN 150 MG/1
150 TABLET ORAL DAILY
COMMUNITY
End: 2022-11-30 | Stop reason: HOSPADM

## 2022-11-23 RX ORDER — ASPIRIN 81 MG/1
324 TABLET, CHEWABLE ORAL ONCE
Status: CANCELLED | OUTPATIENT
Start: 2022-11-23 | End: 2022-11-23

## 2022-11-23 NOTE — TELEPHONE ENCOUNTER
Patient's wife informed of recommendations and made note. She will decrease his Irbesartan to see if that helps before wearing heart monitor. Patient will proceed to ER over the holiday weekend with any syncopal episodes, worsening symptoms or concerns. KENRICK Plascencia William, APRN [place], Marsha Root MA  Caller: Unspecified (Today, 10:07 AM)  I would encourage him go to the ER especially if any further syncopal episodes.  It sounds like he is orthostatic.  Have him wear compression socks increase fluid intake and decrease his irbesartan to 150 mg.  Change positions slowly and use assistant devices if available.  I would also see if he would be willing to come by and get a cardiac event monitor just to make sure that this is not caused by arrhythmia.       Patient's wife called to report patient passed out 3 times yesterday after change in position. Blood pressure stayed elevated and HR low at 180/77 HR 60, 177/71 HR 54. He has cath scheduled next week at [facility]. Advised I will discuss with provider however in the meantime to proceed to ER with any active symptoms or concerns.

## 2022-11-30 ENCOUNTER — HOSPITAL ENCOUNTER (OUTPATIENT)
Facility: HOSPITAL | Age: 72
Setting detail: HOSPITAL OUTPATIENT SURGERY
Discharge: HOME | End: 2022-11-30
Attending: INTERNAL MEDICINE | Admitting: INTERNAL MEDICINE

## 2022-11-30 VITALS
WEIGHT: 225.97 LBS | TEMPERATURE: 99 F | OXYGEN SATURATION: 98 % | BODY MASS INDEX: 29 KG/M2 | HEART RATE: 53 BPM | RESPIRATION RATE: 17 BRPM | DIASTOLIC BLOOD PRESSURE: 81 MMHG | SYSTOLIC BLOOD PRESSURE: 197 MMHG | HEIGHT: 74 IN

## 2022-11-30 DIAGNOSIS — I10 PRIMARY HYPERTENSION: ICD-10-CM

## 2022-11-30 DIAGNOSIS — R42 DIZZINESS AND GIDDINESS: ICD-10-CM

## 2022-11-30 DIAGNOSIS — I25.119 CORONARY ARTERY DISEASE INVOLVING NATIVE CORONARY ARTERY OF NATIVE HEART WITH ANGINA PECTORIS: ICD-10-CM

## 2022-11-30 DIAGNOSIS — R94.39 POSITIVE CARDIAC STRESS TEST: ICD-10-CM

## 2022-11-30 DIAGNOSIS — R60.0 BILATERAL LOWER EXTREMITY EDEMA: ICD-10-CM

## 2022-11-30 DIAGNOSIS — I25.10 CORONARY ARTERY DISEASE INVOLVING NATIVE CORONARY ARTERY OF NATIVE HEART WITHOUT ANGINA PECTORIS: ICD-10-CM

## 2022-11-30 DIAGNOSIS — R07.2 PRECORDIAL PAIN: ICD-10-CM

## 2022-11-30 LAB
ANION GAP SERPL CALCULATED.3IONS-SCNC: 12 MMOL/L (ref 5–15)
BUN SERPL-MCNC: 14 MG/DL (ref 8–23)
BUN/CREAT SERPL: 17.1 (ref 7–25)
CALCIUM SPEC-SCNC: 8.8 MG/DL (ref 8.6–10.5)
CHLORIDE SERPL-SCNC: 102 MMOL/L (ref 98–107)
CHOLEST SERPL-MCNC: 140 MG/DL (ref 0–200)
CO2 SERPL-SCNC: 23 MMOL/L (ref 22–29)
CREAT BLDA-MCNC: 0.7 MG/DL (ref 0.6–1.3)
CREAT SERPL-MCNC: 0.82 MG/DL (ref 0.76–1.27)
DEPRECATED RDW RBC AUTO: 39 FL (ref 37–54)
EGFRCR SERPLBLD CKD-EPI 2021: 93.3 ML/MIN/1.73
ERYTHROCYTE [DISTWIDTH] IN BLOOD BY AUTOMATED COUNT: 12.1 % (ref 12.3–15.4)
GLUCOSE SERPL-MCNC: 192 MG/DL (ref 65–99)
HBA1C MFR BLD: 6.4 % (ref 4.8–5.6)
HCT VFR BLD AUTO: 45.5 % (ref 37.5–51)
HDLC SERPL-MCNC: 42 MG/DL (ref 40–60)
HGB BLD-MCNC: 15.5 G/DL (ref 13–17.7)
LDLC SERPL CALC-MCNC: 86 MG/DL (ref 0–100)
LDLC/HDLC SERPL: 2.08 {RATIO}
MCH RBC QN AUTO: 30 PG (ref 26.6–33)
MCHC RBC AUTO-ENTMCNC: 34.1 G/DL (ref 31.5–35.7)
MCV RBC AUTO: 88 FL (ref 79–97)
PLATELET # BLD AUTO: 284 10*3/MM3 (ref 140–450)
PMV BLD AUTO: 9.9 FL (ref 6–12)
POTASSIUM SERPL-SCNC: 3.7 MMOL/L (ref 3.5–5.2)
RBC # BLD AUTO: 5.17 10*6/MM3 (ref 4.14–5.8)
SODIUM SERPL-SCNC: 137 MMOL/L (ref 136–145)
TRIGL SERPL-MCNC: 54 MG/DL (ref 0–150)
VLDLC SERPL-MCNC: 12 MG/DL (ref 5–40)
WBC NRBC COR # BLD: 11.28 10*3/MM3 (ref 3.4–10.8)

## 2022-11-30 PROCEDURE — 80048 BASIC METABOLIC PNL TOTAL CA: CPT

## 2022-11-30 PROCEDURE — C1887 CATHETER, GUIDING: HCPCS | Performed by: INTERNAL MEDICINE

## 2022-11-30 PROCEDURE — C1769 GUIDE WIRE: HCPCS | Performed by: INTERNAL MEDICINE

## 2022-11-30 PROCEDURE — 93571 IV DOP VEL&/PRESS C FLO 1ST: CPT | Performed by: INTERNAL MEDICINE

## 2022-11-30 PROCEDURE — 83036 HEMOGLOBIN GLYCOSYLATED A1C: CPT

## 2022-11-30 PROCEDURE — 0 IOPAMIDOL PER 1 ML: Performed by: INTERNAL MEDICINE

## 2022-11-30 PROCEDURE — 93454 CORONARY ARTERY ANGIO S&I: CPT | Performed by: INTERNAL MEDICINE

## 2022-11-30 PROCEDURE — 92928 PRQ TCAT PLMT NTRAC ST 1 LES: CPT | Performed by: INTERNAL MEDICINE

## 2022-11-30 PROCEDURE — C1725 CATH, TRANSLUMIN NON-LASER: HCPCS | Performed by: INTERNAL MEDICINE

## 2022-11-30 PROCEDURE — C9600 PERC DRUG-EL COR STENT SING: HCPCS | Performed by: INTERNAL MEDICINE

## 2022-11-30 PROCEDURE — 93799 UNLISTED CV SVC/PROCEDURE: CPT | Performed by: INTERNAL MEDICINE

## 2022-11-30 PROCEDURE — 80061 LIPID PANEL: CPT

## 2022-11-30 PROCEDURE — 85027 COMPLETE CBC AUTOMATED: CPT

## 2022-11-30 PROCEDURE — 93458 L HRT ARTERY/VENTRICLE ANGIO: CPT | Performed by: INTERNAL MEDICINE

## 2022-11-30 PROCEDURE — C1894 INTRO/SHEATH, NON-LASER: HCPCS | Performed by: INTERNAL MEDICINE

## 2022-11-30 PROCEDURE — 25010000002 HEPARIN (PORCINE) PER 1000 UNITS: Performed by: INTERNAL MEDICINE

## 2022-11-30 PROCEDURE — 85347 COAGULATION TIME ACTIVATED: CPT

## 2022-11-30 PROCEDURE — C1874 STENT, COATED/COV W/DEL SYS: HCPCS | Performed by: INTERNAL MEDICINE

## 2022-11-30 PROCEDURE — 93005 ELECTROCARDIOGRAM TRACING: CPT | Performed by: INTERNAL MEDICINE

## 2022-11-30 PROCEDURE — 25010000002 MIDAZOLAM PER 1 MG: Performed by: INTERNAL MEDICINE

## 2022-11-30 PROCEDURE — 82565 ASSAY OF CREATININE: CPT

## 2022-11-30 DEVICE — XIENCE SKYPOINT™ EVEROLIMUS ELUTING CORONARY STENT SYSTEM 3.50 MM X 28 MM / RAPID-EXCHANGE
Type: IMPLANTABLE DEVICE | Status: FUNCTIONAL
Brand: XIENCE SKYPOINT™

## 2022-11-30 DEVICE — XIENCE SKYPOINT™ EVEROLIMUS ELUTING CORONARY STENT SYSTEM 2.25 MM X 28 MM / RAPID-EXCHANGE
Type: IMPLANTABLE DEVICE | Status: FUNCTIONAL
Brand: XIENCE SKYPOINT™

## 2022-11-30 RX ORDER — CARVEDILOL 6.25 MG/1
6.25 TABLET ORAL 2 TIMES DAILY WITH MEALS
Status: ON HOLD | COMMUNITY
End: 2022-11-30 | Stop reason: SDUPTHER

## 2022-11-30 RX ORDER — NICARDIPINE HCL-0.9% SOD CHLOR 1 MG/10 ML
SYRINGE (ML) INTRAVENOUS
Status: DISCONTINUED | OUTPATIENT
Start: 2022-11-30 | End: 2022-11-30 | Stop reason: HOSPADM

## 2022-11-30 RX ORDER — CLOPIDOGREL BISULFATE 75 MG/1
75 TABLET ORAL DAILY
Qty: 90 TABLET | Refills: 3 | Status: SHIPPED | OUTPATIENT
Start: 2022-11-30

## 2022-11-30 RX ORDER — ACETAMINOPHEN 325 MG/1
650 TABLET ORAL EVERY 4 HOURS PRN
Status: DISCONTINUED | OUTPATIENT
Start: 2022-11-30 | End: 2022-11-30 | Stop reason: HOSPADM

## 2022-11-30 RX ORDER — SACUBITRIL AND VALSARTAN 24; 26 MG/1; MG/1
1 TABLET, FILM COATED ORAL 2 TIMES DAILY
Qty: 60 TABLET | Refills: 3 | Status: SHIPPED | OUTPATIENT
Start: 2022-11-30 | End: 2023-03-30

## 2022-11-30 RX ORDER — CARVEDILOL 12.5 MG/1
12.5 TABLET ORAL 2 TIMES DAILY WITH MEALS
Status: DISCONTINUED | OUTPATIENT
Start: 2022-11-30 | End: 2022-11-30 | Stop reason: HOSPADM

## 2022-11-30 RX ORDER — SODIUM CHLORIDE 9 MG/ML
3 INJECTION, SOLUTION INTRAVENOUS CONTINUOUS
Status: ACTIVE | OUTPATIENT
Start: 2022-11-30 | End: 2022-11-30

## 2022-11-30 RX ORDER — SODIUM CHLORIDE 0.9 % (FLUSH) 0.9 %
10 SYRINGE (ML) INJECTION AS NEEDED
Status: DISCONTINUED | OUTPATIENT
Start: 2022-11-30 | End: 2022-11-30 | Stop reason: HOSPADM

## 2022-11-30 RX ORDER — ASPIRIN 81 MG/1
324 TABLET, CHEWABLE ORAL ONCE
Status: COMPLETED | OUTPATIENT
Start: 2022-11-30 | End: 2022-11-30

## 2022-11-30 RX ORDER — HEPARIN SODIUM 1000 [USP'U]/ML
INJECTION, SOLUTION INTRAVENOUS; SUBCUTANEOUS
Status: DISCONTINUED | OUTPATIENT
Start: 2022-11-30 | End: 2022-11-30 | Stop reason: HOSPADM

## 2022-11-30 RX ORDER — LABETALOL HYDROCHLORIDE 5 MG/ML
INJECTION, SOLUTION INTRAVENOUS
Status: DISCONTINUED | OUTPATIENT
Start: 2022-11-30 | End: 2022-11-30 | Stop reason: HOSPADM

## 2022-11-30 RX ORDER — SODIUM CHLORIDE 0.9 % (FLUSH) 0.9 %
10 SYRINGE (ML) INJECTION EVERY 12 HOURS SCHEDULED
Status: DISCONTINUED | OUTPATIENT
Start: 2022-11-30 | End: 2022-11-30 | Stop reason: HOSPADM

## 2022-11-30 RX ORDER — SODIUM CHLORIDE 9 MG/ML
100 INJECTION, SOLUTION INTRAVENOUS CONTINUOUS
Status: ACTIVE | OUTPATIENT
Start: 2022-11-30 | End: 2022-11-30

## 2022-11-30 RX ORDER — SODIUM CHLORIDE 9 MG/ML
40 INJECTION, SOLUTION INTRAVENOUS AS NEEDED
Status: DISCONTINUED | OUTPATIENT
Start: 2022-11-30 | End: 2022-11-30 | Stop reason: HOSPADM

## 2022-11-30 RX ORDER — CARVEDILOL 12.5 MG/1
12.5 TABLET ORAL 2 TIMES DAILY WITH MEALS
Qty: 180 TABLET | Refills: 1 | Status: SHIPPED | OUTPATIENT
Start: 2022-11-30 | End: 2023-02-28 | Stop reason: SDUPTHER

## 2022-11-30 RX ORDER — LIDOCAINE HYDROCHLORIDE 10 MG/ML
INJECTION, SOLUTION EPIDURAL; INFILTRATION; INTRACAUDAL; PERINEURAL
Status: DISCONTINUED | OUTPATIENT
Start: 2022-11-30 | End: 2022-11-30 | Stop reason: HOSPADM

## 2022-11-30 RX ORDER — ASPIRIN 81 MG/1
81 TABLET ORAL DAILY
Status: DISCONTINUED | OUTPATIENT
Start: 2022-12-01 | End: 2022-11-30 | Stop reason: HOSPADM

## 2022-11-30 RX ORDER — MIDAZOLAM HYDROCHLORIDE 1 MG/ML
INJECTION INTRAMUSCULAR; INTRAVENOUS
Status: DISCONTINUED | OUTPATIENT
Start: 2022-11-30 | End: 2022-11-30 | Stop reason: HOSPADM

## 2022-11-30 RX ORDER — CLOPIDOGREL BISULFATE 75 MG/1
TABLET ORAL
Status: DISCONTINUED | OUTPATIENT
Start: 2022-11-30 | End: 2022-11-30 | Stop reason: HOSPADM

## 2022-11-30 RX ADMIN — SACUBITRIL AND VALSARTAN 1 TABLET: 24; 26 TABLET, FILM COATED ORAL at 14:32

## 2022-11-30 RX ADMIN — CARVEDILOL 12.5 MG: 12.5 TABLET, FILM COATED ORAL at 16:42

## 2022-11-30 RX ADMIN — SODIUM CHLORIDE 3 ML/KG/HR: 9 INJECTION, SOLUTION INTRAVENOUS at 07:39

## 2022-11-30 RX ADMIN — ASPIRIN 81 MG CHEWABLE TABLET 324 MG: 81 TABLET CHEWABLE at 07:36

## 2022-11-30 RX ADMIN — CARVEDILOL 12.5 MG: 12.5 TABLET, FILM COATED ORAL at 07:51

## 2022-12-01 ENCOUNTER — CALL CENTER PROGRAMS (OUTPATIENT)
Dept: CALL CENTER | Facility: HOSPITAL | Age: 72
End: 2022-12-01

## 2022-12-01 ENCOUNTER — DOCUMENTATION (OUTPATIENT)
Dept: CARDIAC REHAB | Facility: HOSPITAL | Age: 72
End: 2022-12-01

## 2022-12-01 LAB — ACT BLD: 306 SECONDS (ref 82–152)

## 2022-12-01 NOTE — PROGRESS NOTES
Cardiac Rehab staff mailed referral letter to patient regarding Phase II Cardiac Rehab program. Instruction for patient to contact [facility] Cardiac Rehab Department for additional program information and to forward referral to closest Cardiac Rehab program.

## 2022-12-01 NOTE — OUTREACH NOTE
PCI/Device Survey    Flowsheet Row Responses   Facility patient discharged from? [facility]   Procedure date 11/30/22   Procedure (if device, specify in description) PCI   PCI site Left, Arm   Performing MD Dr. Moisés Bansal   Attempt successful? Yes   Call start time 1438   Call end time 1441   Person spoke with today (if not patient) and relationship spouse- Sol   Has the patient had any of the following symptoms since discharge? --  [no symptoms noted]   Symptom comments patient is having low BP due to an increase in his BP meds, advised for him to call Cardiology office ASAP, voiced understanding   Is the patient taking prescribed medications: ASA, Plavix   Does the patient have any of the following symptoms related to the cath/surgical site? --  [no symptoms noted]   Nursing intervention Patient education provided   Does the patient have an appointment scheduled with the cardiologist? Yes   Appointment comments f/u with cardiology on 12/21   Did the patient feel prepared to go home on the same day as the procedure? Yes   Is the patient satisfied with the same day discharge process? Yes   PCI/Device call completed Yes   Wrap up additional comments Doing well, spouse states patient is having low BP due to a change in his medications, advised for them to cardiology.          CHIARA LUIS - Registered Nurse

## 2022-12-01 NOTE — OUTREACH NOTE
PCI/Device Survey    Flowsheet Row Responses   Facility patient discharged from? [place]   Procedure date 11/30/22   Procedure (if device, specify in description) PCI   PCI site Left, Arm   Performing MD Dr. Moisés Bansal   Attempt successful? No   Unsuccessful attempts Attempt 1          CHIARA LUIS - Registered Nurse

## 2022-12-02 LAB
QT INTERVAL: 450 MS
QTC INTERVAL: 445 MS

## 2022-12-06 RX ORDER — DIPHENHYDRAMINE HCL 25 MG
TABLET ORAL
Qty: 3 TABLET | Refills: 0 | Status: SHIPPED | OUTPATIENT
Start: 2022-12-06 | End: 2023-02-17

## 2022-12-06 RX ORDER — FAMOTIDINE 20 MG/1
TABLET, FILM COATED ORAL
Qty: 3 TABLET | Refills: 0 | Status: SHIPPED | OUTPATIENT
Start: 2022-12-06 | End: 2023-02-17

## 2022-12-06 RX ORDER — PREDNISONE 20 MG/1
TABLET ORAL
Qty: 3 TABLET | Refills: 0 | Status: SHIPPED | OUTPATIENT
Start: 2022-12-06 | End: 2023-02-17

## 2022-12-19 ENCOUNTER — OFFICE VISIT (OUTPATIENT)
Dept: FAMILY MEDICINE CLINIC | Facility: CLINIC | Age: 72
End: 2022-12-19

## 2022-12-19 VITALS
WEIGHT: 225 LBS | OXYGEN SATURATION: 98 % | HEIGHT: 74 IN | TEMPERATURE: 97.6 F | RESPIRATION RATE: 18 BRPM | SYSTOLIC BLOOD PRESSURE: 192 MMHG | HEART RATE: 81 BPM | BODY MASS INDEX: 28.88 KG/M2 | DIASTOLIC BLOOD PRESSURE: 90 MMHG

## 2022-12-19 DIAGNOSIS — E11.40 TYPE 2 DIABETES MELLITUS WITH DIABETIC NEUROPATHY, WITH LONG-TERM CURRENT USE OF INSULIN: Primary | ICD-10-CM

## 2022-12-19 DIAGNOSIS — G89.29 CHRONIC PAIN OF BOTH KNEES: ICD-10-CM

## 2022-12-19 DIAGNOSIS — M25.561 CHRONIC PAIN OF BOTH KNEES: ICD-10-CM

## 2022-12-19 DIAGNOSIS — Z79.4 TYPE 2 DIABETES MELLITUS WITH DIABETIC NEUROPATHY, WITH LONG-TERM CURRENT USE OF INSULIN: Primary | ICD-10-CM

## 2022-12-19 DIAGNOSIS — E78.49 OTHER HYPERLIPIDEMIA: ICD-10-CM

## 2022-12-19 DIAGNOSIS — M48.02 CERVICAL SPINAL STENOSIS: ICD-10-CM

## 2022-12-19 DIAGNOSIS — I10 PRIMARY HYPERTENSION: ICD-10-CM

## 2022-12-19 DIAGNOSIS — J44.9 CHRONIC OBSTRUCTIVE PULMONARY DISEASE, UNSPECIFIED COPD TYPE: ICD-10-CM

## 2022-12-19 DIAGNOSIS — M25.562 CHRONIC PAIN OF BOTH KNEES: ICD-10-CM

## 2022-12-19 PROBLEM — R55 NEAR SYNCOPE: Status: ACTIVE | Noted: 2020-08-02

## 2022-12-19 PROCEDURE — 99214 OFFICE O/P EST MOD 30 MIN: CPT | Performed by: FAMILY MEDICINE

## 2022-12-19 RX ORDER — DICLOFENAC SODIUM 75 MG/1
75 TABLET, DELAYED RELEASE ORAL 2 TIMES DAILY
Qty: 60 TABLET | Refills: 0 | Status: SHIPPED | OUTPATIENT
Start: 2022-12-19 | End: 2023-01-30

## 2022-12-19 RX ORDER — BUDESONIDE AND FORMOTEROL FUMARATE DIHYDRATE 160; 4.5 UG/1; UG/1
2 AEROSOL RESPIRATORY (INHALATION)
Qty: 10.6 G | Refills: 2 | Status: SHIPPED | OUTPATIENT
Start: 2022-12-19

## 2022-12-19 RX ORDER — LOVASTATIN 40 MG/1
40 TABLET ORAL NIGHTLY
Qty: 30 TABLET | Refills: 2 | Status: SHIPPED | OUTPATIENT
Start: 2022-12-19

## 2022-12-19 NOTE — PROGRESS NOTES
"Chief Complaint  Follow-up (3 month follow up on Diabetes & Hypertension/)    Subjective        Panda Henry presents to [facility]  History of Present Illness  Patient is 72 y.o. male who presents today for follow-up on chronic medical problems including diabetes, hypertension, hyperlipidemia and COPD, chronic neck pain and chronic pain on both knees.. Patient denies headache, dizziness, chest pain, palpitations, shortness of breath, cough, nausea, vomiting, abdominal pain and fatigue. Patient complains of joint pain. Patient is here for monitoring of chronic issues due to need for monitoring of adverse effects and side effects.  The patient's hemoglobin A1c was down to 6.4% 2 weeks ago.  The patient's blood pressure however is still staying elevated.      Objective   Vital Signs:  BP (!) 192/90   Pulse 81   Temp 97.6 °F (36.4 °C) (Temporal)   Resp 18   Ht 188 cm (74\")   Wt 102 kg (225 lb)   SpO2 98%   BMI 28.89 kg/m²   Estimated body mass index is 28.89 kg/m² as calculated from the following:    Height as of this encounter: 188 cm (74\").    Weight as of this encounter: 102 kg (225 lb).          Physical Exam  Vitals and nursing note reviewed.   Constitutional:       General: He is not in acute distress.     Appearance: Normal appearance. He is well-developed and well-groomed.   HENT:      Head: Normocephalic and atraumatic.      Jaw: No tenderness or pain on movement.      Salivary Glands: Right salivary gland is not diffusely enlarged. Left salivary gland is not diffusely enlarged.      Right Ear: Tympanic membrane and ear canal normal.      Left Ear: Tympanic membrane and ear canal normal.      Nose: No congestion or rhinorrhea.      Mouth/Throat:      Mouth: Mucous membranes are moist.      Pharynx: No oropharyngeal exudate or posterior oropharyngeal erythema.   Eyes:      General: Lids are normal. No allergic shiner or scleral icterus.     Conjunctiva/sclera: Conjunctivae " normal.      Pupils: Pupils are equal, round, and reactive to light.   Neck:      Thyroid: No thyroid mass, thyromegaly or thyroid tenderness.      Trachea: Trachea normal.   Cardiovascular:      Rate and Rhythm: Normal rate and regular rhythm.  No extrasystoles are present.     Pulses: Normal pulses.      Heart sounds: Normal heart sounds. No murmur heard.  Pulmonary:      Effort: Pulmonary effort is normal. No respiratory distress.      Breath sounds: Normal breath sounds. No decreased breath sounds, wheezing, rhonchi or rales.   Chest:   Breasts:     Right: Normal.      Left: Normal.   Abdominal:      General: Bowel sounds are normal.      Palpations: Abdomen is soft.      Tenderness: There is no abdominal tenderness. There is no right CVA tenderness, left CVA tenderness, guarding or rebound.   Musculoskeletal:      Cervical back: Neck supple.      Right lower leg: No edema.      Left lower leg: No edema.   Lymphadenopathy:      Cervical: No cervical adenopathy.      Upper Body:      Right upper body: No supraclavicular or axillary adenopathy.      Left upper body: No supraclavicular or axillary adenopathy.   Skin:     General: Skin is warm.      Nails: There is no clubbing.   Neurological:      General: No focal deficit present.      Mental Status: He is alert and oriented to person, place, and time.      Sensory: Sensation is intact.      Motor: Motor function is intact.      Coordination: Coordination is intact.   Psychiatric:         Attention and Perception: Attention and perception normal.         Mood and Affect: Mood normal.         Speech: Speech normal.         Behavior: Behavior normal. Behavior is cooperative.         Thought Content: Thought content normal.        Result Review :                Assessment and Plan   Diagnoses and all orders for this visit:    1. Type 2 diabetes mellitus with diabetic neuropathy, with long-term current use of insulin (HCC) (Primary)    2. Chronic obstructive pulmonary  disease, unspecified COPD type (HCC)  -     budesonide-formoterol (SYMBICORT) 160-4.5 MCG/ACT inhaler; Inhale 2 puffs 2 (Two) Times a Day for 30 days.  Dispense: 10.6 g; Refill: 2    3. Other hyperlipidemia  -     lovastatin (MEVACOR) 40 MG tablet; Take 1 tablet by mouth Every Night for 30 days.  Dispense: 30 tablet; Refill: 2    4. Primary hypertension    5. Chronic pain of both knees  -     diclofenac (VOLTAREN) 75 MG EC tablet; Take 1 tablet by mouth 2 (Two) Times a Day for 30 days.  Dispense: 60 tablet; Refill: 0    6. Cervical spinal stenosis  -     Ambulatory Referral to Neurosurgery    The patient was advised to increase the Carvedilol to 25 mg bid, he states that he has 25 mg at home and if not to take 2 12.5 mg bid.         Follow Up   Return in about 2 weeks (around 1/2/2023).  Patient was given instructions and counseling regarding his condition or for health maintenance advice. Please see specific information pulled into the AVS if appropriate.     As above the patient's carvedilol was increased to 25 mg p.o. twice daily.  Will refill some of his medications as above.The patient is advised to continue all of his regular medications as prescribed. He was counseled regarding the importance of diet, exercise and medication compliance. The patient will return in 2 weeks to recheck his blood pressure.      This document has been electronically signed by Alberto Green MD  December 19, 2022 16:28 EST

## 2022-12-19 NOTE — PATIENT INSTRUCTIONS
"Hypertension, Adult  High blood pressure (hypertension) is when the force of blood pumping through the arteries is too strong. The arteries are the blood vessels that carry blood from the heart throughout the body. Hypertension forces the heart to work harder to pump blood and may cause arteries to become narrow or stiff. Untreated or uncontrolled hypertension can cause a heart attack, heart failure, a stroke, kidney disease, and other problems.  A blood pressure reading consists of a higher number over a lower number. Ideally, your blood pressure should be below 120/80. The first (\"top\") number is called the systolic pressure. It is a measure of the pressure in your arteries as your heart beats. The second (\"bottom\") number is called the diastolic pressure. It is a measure of the pressure in your arteries as the heart relaxes.  What are the causes?  The exact cause of this condition is not known. There are some conditions that result in or are related to high blood pressure.  What increases the risk?  Some risk factors for high blood pressure are under your control. The following factors may make you more likely to develop this condition:  Smoking.  Having type 2 diabetes mellitus, high cholesterol, or both.  Not getting enough exercise or physical activity.  Being overweight.  Having too much fat, sugar, calories, or salt (sodium) in your diet.  Drinking too much alcohol.  Some risk factors for high blood pressure may be difficult or impossible to change. Some of these factors include:  Having chronic kidney disease.  Having a family history of high blood pressure.  Age. Risk increases with age.  Race. You may be at higher risk if you are .  Gender. Men are at higher risk than women before age 45. After age 65, women are at higher risk than men.  Having obstructive sleep apnea.  Stress.  What are the signs or symptoms?  High blood pressure may not cause symptoms. Very high blood pressure " (hypertensive crisis) may cause:  Headache.  Anxiety.  Shortness of breath.  Nosebleed.  Nausea and vomiting.  Vision changes.  Severe chest pain.  Seizures.  How is this diagnosed?  This condition is diagnosed by measuring your blood pressure while you are seated, with your arm resting on a flat surface, your legs uncrossed, and your feet flat on the floor. The cuff of the blood pressure monitor will be placed directly against the skin of your upper arm at the level of your heart. It should be measured at least twice using the same arm. Certain conditions can cause a difference in blood pressure between your right and left arms.  Certain factors can cause blood pressure readings to be lower or higher than normal for a short period of time:  When your blood pressure is higher when you are in a health care provider's office than when you are at home, this is called white coat hypertension. Most people with this condition do not need medicines.  When your blood pressure is higher at home than when you are in a health care provider's office, this is called masked hypertension. Most people with this condition may need medicines to control blood pressure.  If you have a high blood pressure reading during one visit or you have normal blood pressure with other risk factors, you may be asked to:  Return on a different day to have your blood pressure checked again.  Monitor your blood pressure at home for 1 week or longer.  If you are diagnosed with hypertension, you may have other blood or imaging tests to help your health care provider understand your overall risk for other conditions.  How is this treated?  This condition is treated by making healthy lifestyle changes, such as eating healthy foods, exercising more, and reducing your alcohol intake. Your health care provider may prescribe medicine if lifestyle changes are not enough to get your blood pressure under control, and if:  Your systolic blood pressure is above  130.  Your diastolic blood pressure is above 80.  Your personal target blood pressure may vary depending on your medical conditions, your age, and other factors.  Follow these instructions at home:  Eating and drinking    Eat a diet that is high in fiber and potassium, and low in sodium, added sugar, and fat. An example eating plan is called the DASH (Dietary Approaches to Stop Hypertension) diet. To eat this way:  Eat plenty of fresh fruits and vegetables. Try to fill one half of your plate at each meal with fruits and vegetables.  Eat whole grains, such as whole-wheat pasta, brown rice, or whole-grain bread. Fill about one fourth of your plate with whole grains.  Eat or drink low-fat dairy products, such as skim milk or low-fat yogurt.  Avoid fatty cuts of meat, processed or cured meats, and poultry with skin. Fill about one fourth of your plate with lean proteins, such as fish, chicken without skin, beans, eggs, or tofu.  Avoid pre-made and processed foods. These tend to be higher in sodium, added sugar, and fat.  Reduce your daily sodium intake. Most people with hypertension should eat less than 1,500 mg of sodium a day.  Do not drink alcohol if:  Your health care provider tells you not to drink.  You are pregnant, may be pregnant, or are planning to become pregnant.  If you drink alcohol:  Limit how much you use to:  0-1 drink a day for women.  0-2 drinks a day for men.  Be aware of how much alcohol is in your drink. In the [place] one drink equals one 12 oz bottle of beer (355 mL), one 5 oz glass of wine (148 mL), or one 1½ oz glass of hard liquor (44 mL).  Lifestyle    Work with your health care provider to maintain a healthy body weight or to lose weight. Ask what an ideal weight is for you.  Get at least 30 minutes of exercise most days of the week. Activities may include walking, swimming, or biking.  Include exercise to strengthen your muscles (resistance exercise), such as Pilates or lifting weights, as  part of your weekly exercise routine. Try to do these types of exercises for 30 minutes at least 3 days a week.  Do not use any products that contain nicotine or tobacco, such as cigarettes, e-cigarettes, and chewing tobacco. If you need help quitting, ask your health care provider.  Monitor your blood pressure at home as told by your health care provider.  Keep all follow-up visits as told by your health care provider. This is important.  Medicines  Take over-the-counter and prescription medicines only as told by your health care provider. Follow directions carefully. Blood pressure medicines must be taken as prescribed.  Do not skip doses of blood pressure medicine. Doing this puts you at risk for problems and can make the medicine less effective.  Ask your health care provider about side effects or reactions to medicines that you should watch for.  Contact a health care provider if you:  Think you are having a reaction to a medicine you are taking.  Have headaches that keep coming back (recurring).  Feel dizzy.  Have swelling in your ankles.  Have trouble with your vision.  Get help right away if you:  Develop a severe headache or confusion.  Have unusual weakness or numbness.  Feel faint.  Have severe pain in your chest or abdomen.  Vomit repeatedly.  Have trouble breathing.  Summary  Hypertension is when the force of blood pumping through your arteries is too strong. If this condition is not controlled, it may put you at risk for serious complications.  Your personal target blood pressure may vary depending on your medical conditions, your age, and other factors. For most people, a normal blood pressure is less than 120/80.  Hypertension is treated with lifestyle changes, medicines, or a combination of both. Lifestyle changes include losing weight, eating a healthy, low-sodium diet, exercising more, and limiting alcohol.  This information is not intended to replace advice given to you by your health care  provider. Make sure you discuss any questions you have with your health care provider.  Document Revised: 08/28/2019 Document Reviewed: 08/28/2019  [employer] Patient Education © 2022 [employer]  Type 2 Diabetes Mellitus, Diagnosis, Adult  Type 2 diabetes (type 2 diabetes mellitus) is a long-term, or chronic, disease. In type 2 diabetes, one or both of these problems may be present:  The pancreas does not make enough of a hormone called insulin.  Cells in the body do not respond properly to the insulin that the body makes (insulin resistance).  Normally, insulin allows blood sugar (glucose) to enter cells in the body. The cells use glucose for energy. Insulin resistance or lack of insulin causes excess glucose to build up in the blood instead of going into cells. This causes high blood glucose (hyperglycemia).   What are the causes?  The exact cause of type 2 diabetes is not known.  What increases the risk?  The following factors may make you more likely to develop this condition:  Having a family member with type 2 diabetes.  Being overweight or obese.  Being inactive (sedentary).  Having been diagnosed with insulin resistance.  Having a history of prediabetes, diabetes when you were pregnant (gestational diabetes), or polycystic ovary syndrome (PCOS).  What are the signs or symptoms?  In the early stage of this condition, you may not have symptoms. Symptoms develop slowly and may include:  Increased thirst or hunger.  Increased urination.  Unexplained weight loss.  Tiredness (fatigue) or weakness.  Vision changes, such as blurry vision.  Dark patches on the skin.  How is this diagnosed?  This condition is diagnosed based on your symptoms, your medical history, a physical exam, and your blood glucose level. Your blood glucose may be checked with one or more of the following blood tests:  A fasting blood glucose (FBG) test. You will not be allowed to eat (you will fast) for 8 hours or longer before a blood sample  is taken.  A random blood glucose test. This test checks blood glucose at any time of day regardless of when you ate.  An A1C (hemoglobin A1C) blood test. This test provides information about blood glucose levels over the previous 2-3 months.  An oral glucose tolerance test (OGTT). This test measures your blood glucose at two times:  After fasting. This is your baseline blood glucose level.  Two hours after drinking a beverage that contains glucose.  You may be diagnosed with type 2 diabetes if:  Your fasting blood glucose level is 126 mg/dL (7.0 mmol/L) or higher.  Your random blood glucose level is 200 mg/dL (11.1 mmol/L) or higher.  Your A1C level is 6.5% or higher.  Your oral glucose tolerance test result is higher than 200 mg/dL (11.1 mmol/L).  These blood tests may be repeated to confirm your diagnosis.  How is this treated?  Your treatment may be managed by a specialist called an endocrinologist. Type 2 diabetes may be treated by following instructions from your health care provider about:  Making dietary and lifestyle changes. These may include:  Following a personalized nutrition plan that is developed by a registered dietitian.  Exercising regularly.  Finding ways to manage stress.  Checking your blood glucose level as often as told.  Taking diabetes medicines or insulin daily. This helps to keep your blood glucose levels in the healthy range.  Taking medicines to help prevent complications from diabetes. Medicines may include:  Aspirin.  Medicine to lower cholesterol.  Medicine to control blood pressure.  Your health care provider will set treatment goals for you. Your goals will be based on your age, other medical conditions you have, and how you respond to diabetes treatment. Generally, the goal of treatment is to maintain the following blood glucose levels:  Before meals:  mg/dL (4.4-7.2 mmol/L).  After meals: below 180 mg/dL (10 mmol/L).  A1C level: less than 7%.  Follow these instructions at  home:  Questions to ask your health care provider  Consider asking the following questions:  Should I meet with a certified diabetes care and ?  What diabetes medicines do I need, and when should I take them?  What equipment will I need to manage my diabetes at home?  How often do I need to check my blood glucose?  Where can I find a support group for people with diabetes?  What number can I call if I have questions?  When is my next appointment?  General instructions  Take over-the-counter and prescription medicines only as told by your health care provider.  Keep all follow-up visits. This is important.  Where to find more information  For help and guidance and for more information about diabetes, please visit:  American Diabetes Association (ADA): www.diabetes.org  American Association of Diabetes Care and Education Specialists (ADCES): www.diabeteseducator.org  International Diabetes Federation (IDF): www.idf.org  Contact a health care provider if:  Your blood glucose is at or above 240 mg/dL (13.3 mmol/L) for 2 days in a row.  You have been sick or have had a fever for 2 days or longer, and you are not getting better.  You have any of the following problems for more than 6 hours:  You cannot eat or drink.  You have nausea and vomiting.  You have diarrhea.  Get help right away if:  You have severe hypoglycemia. This means your blood glucose is lower than 54 mg/dL (3.0 mmol/L).  You become confused or you have trouble thinking clearly.  You have difficulty breathing.  You have moderate or large ketone levels in your urine.  These symptoms may represent a serious problem that is an emergency. Do not wait to see if the symptoms will go away. Get medical help right away. Call your local emergency services (911 in the [place] Do not drive yourself to the hospital.  Summary  Type 2 diabetes mellitus is a long-term, or chronic, disease. In type 2 diabetes, the pancreas does not make enough of a  hormone called insulin, or cells in the body do not respond properly to insulin that the body makes.  This condition is treated by making dietary and lifestyle changes and taking diabetes medicines or insulin.  Your health care provider will set treatment goals for you. Your goals will be based on your age, other medical conditions you have, and how you respond to diabetes treatment.  Keep all follow-up visits. This is important.  This information is not intended to replace advice given to you by your health care provider. Make sure you discuss any questions you have with your health care provider.  Document Revised: 03/14/2022 Document Reviewed: 03/14/2022  [employer] Patient Education © 2022 [employer]

## 2023-01-03 ENCOUNTER — OFFICE VISIT (OUTPATIENT)
Dept: FAMILY MEDICINE CLINIC | Facility: CLINIC | Age: 73
End: 2023-01-03
Payer: MEDICARE

## 2023-01-03 VITALS
BODY MASS INDEX: 29.03 KG/M2 | HEART RATE: 63 BPM | OXYGEN SATURATION: 98 % | DIASTOLIC BLOOD PRESSURE: 70 MMHG | TEMPERATURE: 97.3 F | SYSTOLIC BLOOD PRESSURE: 138 MMHG | WEIGHT: 226.2 LBS | HEIGHT: 74 IN

## 2023-01-03 DIAGNOSIS — I10 PRIMARY HYPERTENSION: Primary | ICD-10-CM

## 2023-01-03 DIAGNOSIS — R42 VERTIGO: ICD-10-CM

## 2023-01-03 PROCEDURE — 99999 PR OFFICE/OUTPT VISIT,PROCEDURE ONLY: CPT | Performed by: FAMILY MEDICINE

## 2023-01-03 PROCEDURE — 99214 OFFICE O/P EST MOD 30 MIN: CPT | Performed by: FAMILY MEDICINE

## 2023-01-03 RX ORDER — AMLODIPINE BESYLATE 5 MG/1
5 TABLET ORAL DAILY
Qty: 30 TABLET | Refills: 0 | Status: SHIPPED | OUTPATIENT
Start: 2023-01-03 | End: 2023-01-26

## 2023-01-03 RX ORDER — CARVEDILOL 6.25 MG/1
6.25 TABLET ORAL 2 TIMES DAILY WITH MEALS
COMMUNITY
End: 2023-01-03 | Stop reason: SDUPTHER

## 2023-01-03 RX ORDER — CARVEDILOL 6.25 MG/1
6.25 TABLET ORAL 2 TIMES DAILY WITH MEALS
Qty: 180 TABLET | Refills: 0 | Status: SHIPPED | OUTPATIENT
Start: 2023-01-03 | End: 2023-02-17

## 2023-01-03 RX ORDER — MECLIZINE HYDROCHLORIDE 25 MG/1
25 TABLET ORAL 3 TIMES DAILY PRN
Qty: 30 TABLET | Refills: 0 | Status: SHIPPED | OUTPATIENT
Start: 2023-01-03 | End: 2023-01-13

## 2023-01-03 NOTE — PROGRESS NOTES
Chief Complaint  Follow-up and Hypertension    Subjective        Panda Henry presents to [facility]  History of Present Illness  The patient is a 73 yo male who is here for follow up on his hypertension. He stated that he cannot take the 2, 12 mg carvedilol bid because it makes him too weak. His blood pressure log showed that his blood pressure is staying elevated at home around 170-180 average systolic. He denies any headaches but has some dizziness with some left earache but not a lot of congestion.      Objective   Vital Signs:  /70   Pulse 63   Temp 97.3 °F (36.3 °C)   Ht 188 cm (74.02\")   Wt 103 kg (226 lb 3.2 oz)   SpO2 98%   BMI 29.03 kg/m²   Estimated body mass index is 29.03 kg/m² as calculated from the following:    Height as of this encounter: 188 cm (74.02\").    Weight as of this encounter: 103 kg (226 lb 3.2 oz).    BMI is >= 25 and <30. (Overweight) The following options were offered after discussion;: none (medical contraindication)      Physical Exam  Vitals and nursing note reviewed.   Constitutional:       General: He is not in acute distress.     Appearance: Normal appearance. He is well-developed and well-groomed.   HENT:      Head: Normocephalic and atraumatic.      Jaw: No tenderness or pain on movement.      Salivary Glands: Right salivary gland is not diffusely enlarged. Left salivary gland is not diffusely enlarged.      Right Ear: Tympanic membrane and ear canal normal.      Left Ear: Tympanic membrane and ear canal normal.      Nose: No congestion or rhinorrhea.      Mouth/Throat:      Mouth: Mucous membranes are moist.      Pharynx: No oropharyngeal exudate or posterior oropharyngeal erythema.   Eyes:      General: Lids are normal. No allergic shiner or scleral icterus.     Conjunctiva/sclera: Conjunctivae normal.      Pupils: Pupils are equal, round, and reactive to light.   Neck:      Thyroid: No thyroid mass, thyromegaly or thyroid  tenderness.      Trachea: Trachea normal.   Cardiovascular:      Rate and Rhythm: Normal rate and regular rhythm.  No extrasystoles are present.     Pulses: Normal pulses.      Heart sounds: Normal heart sounds. No murmur heard.  Pulmonary:      Effort: Pulmonary effort is normal. No respiratory distress.      Breath sounds: Normal breath sounds. No decreased breath sounds, wheezing, rhonchi or rales.   Chest:   Breasts:     Right: Normal.      Left: Normal.   Abdominal:      General: Bowel sounds are normal.      Palpations: Abdomen is soft.      Tenderness: There is no abdominal tenderness. There is no right CVA tenderness, left CVA tenderness, guarding or rebound.   Musculoskeletal:      Cervical back: Neck supple.      Right lower leg: No edema.      Left lower leg: No edema.   Lymphadenopathy:      Cervical: No cervical adenopathy.      Upper Body:      Right upper body: No supraclavicular or axillary adenopathy.      Left upper body: No supraclavicular or axillary adenopathy.   Skin:     General: Skin is warm.      Nails: There is no clubbing.   Neurological:      General: No focal deficit present.      Mental Status: He is alert and oriented to person, place, and time.      Sensory: Sensation is intact.      Motor: Motor function is intact.      Coordination: Coordination is intact.   Psychiatric:         Attention and Perception: Attention and perception normal.         Mood and Affect: Mood normal.         Speech: Speech normal.         Behavior: Behavior normal. Behavior is cooperative.         Thought Content: Thought content normal.        Result Review :                Assessment and Plan   Diagnoses and all orders for this visit:    1. Primary hypertension (Primary)  -     carvedilol (COREG) 6.25 MG tablet; Take 1 tablet by mouth 2 (Two) Times a Day With Meals for 90 days.  Dispense: 180 tablet; Refill: 0  -     amLODIPine (NORVASC) 5 MG tablet; Take 1 tablet by mouth Daily for 30 days.  Dispense: 30  tablet; Refill: 0    2. Vertigo  -     meclizine (ANTIVERT) 25 MG tablet; Take 1 tablet by mouth 3 (Three) Times a Day As Needed for Dizziness for up to 10 days.  Dispense: 30 tablet; Refill: 0           I spent 20 minutes caring for Panda on this date of service. This time includes time spent by me in the following activities:preparing for the visit, reviewing tests, obtaining and/or reviewing a separately obtained history, performing a medically appropriate examination and/or evaluation , counseling and educating the patient/family/caregiver, ordering medications, tests, or procedures and documenting information in the medical record     Follow Up   Return in about 1 month (around 2/3/2023).  Patient was given instructions and counseling regarding his condition or for health maintenance advice. Please see specific information pulled into the AVS if appropriate.     We will add amlodipine 5 mg 1 p.o. daily to see if it will help his blood pressure together with his other medications.  The patient will also be given meclizine 25 mg 1 p.o. 3 times daily as needed for the dizziness.The patient is advised to continue all of his regular medications as prescribed. He was counseled regarding the importance of diet, exercise and medication compliance.      This document has been electronically signed by Alberto Green MD  January 3, 2023 15:37 EST

## 2023-01-26 DIAGNOSIS — I10 PRIMARY HYPERTENSION: ICD-10-CM

## 2023-01-26 RX ORDER — AMLODIPINE BESYLATE 5 MG/1
TABLET ORAL
Qty: 30 TABLET | Refills: 0 | Status: SHIPPED | OUTPATIENT
Start: 2023-01-26 | End: 2023-02-17

## 2023-01-26 NOTE — TELEPHONE ENCOUNTER
Rx Refill Note  Requested Prescriptions     Pending Prescriptions Disp Refills   • amLODIPine (NORVASC) 5 MG tablet [Pharmacy Med Name: AMLODIPINE BESYLATE 5 MG TAB] 30 tablet 0     Sig: TAKE 1 TABLET BY MOUTH EVERY DAY      Last office visit with prescribing clinician: 1/3/2023   Last telemedicine visit with prescribing clinician: 2/14/2023   Next office visit with prescribing clinician: 2/14/2023                         Would you like a call back once the refill request has been completed: [] Yes [] No    If the office needs to give you a call back, can they leave a voicemail: [] Yes [] No    Kaylah Levine RN  01/26/23, 09:37 EST

## 2023-01-30 DIAGNOSIS — M25.562 CHRONIC PAIN OF BOTH KNEES: ICD-10-CM

## 2023-01-30 DIAGNOSIS — G89.29 CHRONIC PAIN OF BOTH KNEES: ICD-10-CM

## 2023-01-30 DIAGNOSIS — M25.561 CHRONIC PAIN OF BOTH KNEES: ICD-10-CM

## 2023-01-30 RX ORDER — DICLOFENAC SODIUM 75 MG/1
TABLET, DELAYED RELEASE ORAL
Qty: 60 TABLET | Refills: 0 | Status: SHIPPED | OUTPATIENT
Start: 2023-01-30

## 2023-01-30 NOTE — TELEPHONE ENCOUNTER
Rx Refill Note  Requested Prescriptions     Pending Prescriptions Disp Refills   • diclofenac (VOLTAREN) 75 MG EC tablet [Pharmacy Med Name: DICLOFENAC SOD EC 75 MG TAB] 60 tablet 0     Sig: TAKE 1 TABLET BY MOUTH 2 TIMES A DAY FOR 30 DAYS.      Last office visit with prescribing clinician: 1/3/2023   Last telemedicine visit with prescribing clinician: 2/14/2023   Next office visit with prescribing clinician: 2/14/2023                         Would you like a call back once the refill request has been completed: [] Yes [] No    If the office needs to give you a call back, can they leave a voicemail: [] Yes [] No    Kaylah Levine RN  01/30/23, 08:43 EST

## 2023-02-17 ENCOUNTER — OFFICE VISIT (OUTPATIENT)
Dept: FAMILY MEDICINE CLINIC | Facility: CLINIC | Age: 73
End: 2023-02-17
Payer: MEDICARE

## 2023-02-17 VITALS
HEIGHT: 74 IN | HEART RATE: 87 BPM | TEMPERATURE: 97.1 F | SYSTOLIC BLOOD PRESSURE: 150 MMHG | WEIGHT: 228 LBS | OXYGEN SATURATION: 99 % | BODY MASS INDEX: 29.26 KG/M2 | DIASTOLIC BLOOD PRESSURE: 90 MMHG

## 2023-02-17 DIAGNOSIS — J01.10 ACUTE NON-RECURRENT FRONTAL SINUSITIS: ICD-10-CM

## 2023-02-17 DIAGNOSIS — Z79.4 TYPE 2 DIABETES MELLITUS WITH DIABETIC NEUROPATHY, WITH LONG-TERM CURRENT USE OF INSULIN: ICD-10-CM

## 2023-02-17 DIAGNOSIS — E11.40 TYPE 2 DIABETES MELLITUS WITH DIABETIC NEUROPATHY, WITH LONG-TERM CURRENT USE OF INSULIN: ICD-10-CM

## 2023-02-17 DIAGNOSIS — J01.00 ACUTE NON-RECURRENT MAXILLARY SINUSITIS: Primary | ICD-10-CM

## 2023-02-17 DIAGNOSIS — I10 PRIMARY HYPERTENSION: ICD-10-CM

## 2023-02-17 LAB
EXPIRATION DATE: NORMAL
HBA1C MFR BLD: 6.9 %
Lab: NORMAL

## 2023-02-17 PROCEDURE — 3044F HG A1C LEVEL LT 7.0%: CPT | Performed by: FAMILY MEDICINE

## 2023-02-17 PROCEDURE — 96372 THER/PROPH/DIAG INJ SC/IM: CPT | Performed by: FAMILY MEDICINE

## 2023-02-17 PROCEDURE — 83036 HEMOGLOBIN GLYCOSYLATED A1C: CPT | Performed by: FAMILY MEDICINE

## 2023-02-17 PROCEDURE — 99214 OFFICE O/P EST MOD 30 MIN: CPT | Performed by: FAMILY MEDICINE

## 2023-02-17 RX ORDER — OMEPRAZOLE 40 MG/1
CAPSULE, DELAYED RELEASE ORAL
COMMUNITY
Start: 2022-12-28

## 2023-02-17 RX ORDER — DEXAMETHASONE SODIUM PHOSPHATE 4 MG/ML
4 INJECTION, SOLUTION INTRA-ARTICULAR; INTRALESIONAL; INTRAMUSCULAR; INTRAVENOUS; SOFT TISSUE ONCE
Status: COMPLETED | OUTPATIENT
Start: 2023-02-17 | End: 2023-02-17

## 2023-02-17 RX ORDER — CHLORTHALIDONE 25 MG/1
12.5 TABLET ORAL DAILY
COMMUNITY

## 2023-02-17 RX ORDER — AMOXICILLIN 875 MG/1
875 TABLET, COATED ORAL 2 TIMES DAILY
Qty: 20 TABLET | Refills: 0 | Status: SHIPPED | OUTPATIENT
Start: 2023-02-17 | End: 2023-02-27

## 2023-02-17 RX ORDER — MECLIZINE HYDROCHLORIDE 25 MG/1
25 TABLET ORAL 3 TIMES DAILY PRN
Qty: 15 TABLET | Refills: 0 | Status: SHIPPED | OUTPATIENT
Start: 2023-02-17 | End: 2023-02-22

## 2023-02-17 RX ADMIN — DEXAMETHASONE SODIUM PHOSPHATE 4 MG: 4 INJECTION, SOLUTION INTRA-ARTICULAR; INTRALESIONAL; INTRAMUSCULAR; INTRAVENOUS; SOFT TISSUE at 11:32

## 2023-02-17 NOTE — ASSESSMENT & PLAN NOTE
Hypertension is improving with treatment.  Continue current treatment regimen.  Weight loss.  Continue current medications.  Blood pressure will be reassessed at the next regular appointment.  [facility] is also [facility].

## 2023-02-17 NOTE — PATIENT INSTRUCTIONS
Sinusitis, Adult  Sinusitis is soreness and swelling (inflammation) of your sinuses. Sinuses are hollow spaces in the bones around your face. They are located:  Around your eyes.  In the middle of your forehead.  Behind your nose.  In your cheekbones.  Your sinuses and nasal passages are lined with a fluid called mucus. Mucus drains out of your sinuses. Swelling can trap mucus in your sinuses. This lets germs (bacteria, virus, or fungus) grow, which leads to infection. Most of the time, this condition is caused by a virus.  What are the causes?  This condition is caused by:  Allergies.  Asthma.  Germs.  Things that block your nose or sinuses.  Growths in the nose (nasal polyps).  Chemicals or irritants in the air.  Fungus (rare).  What increases the risk?  You are more likely to develop this condition if:  You have a weak body defense system (immune system).  You do a lot of swimming or diving.  You use nasal sprays too much.  You smoke.  What are the signs or symptoms?  The main symptoms of this condition are pain and a feeling of pressure around the sinuses. Other symptoms include:  Stuffy nose (congestion).  Runny nose (drainage).  Swelling and warmth in the sinuses.  Headache.  Toothache.  A cough that may get worse at night.  Mucus that collects in the throat or the back of the nose (postnasal drip).  Being unable to smell and taste.  Being very tired (fatigue).  A fever.  Sore throat.  Bad breath.  How is this diagnosed?  This condition is diagnosed based on:  Your symptoms.  Your medical history.  A physical exam.  Tests to find out if your condition is short-term (acute) or long-term (chronic). Your doctor may:  Check your nose for growths (polyps).  Check your sinuses using a tool that has a light (endoscope).  Check for allergies or germs.  Do imaging tests, such as an MRI or CT scan.  How is this treated?  Treatment for this condition depends on the cause and whether it is short-term or long-term.  If  caused by a virus, your symptoms should go away on their own within 10 days. You may be given medicines to relieve symptoms. They include:  Medicines that shrink swollen tissue in the nose.  Medicines that treat allergies (antihistamines).  A spray that treats swelling of the nostrils.   Rinses that help get rid of thick mucus in your nose (nasal saline washes).  If caused by bacteria, your doctor may wait to see if you will get better without treatment. You may be given antibiotic medicine if you have:  A very bad infection.  A weak body defense system.  If caused by growths in the nose, you may need to have surgery.  Follow these instructions at home:  Medicines  Take, use, or apply over-the-counter and prescription medicines only as told by your doctor. These may include nasal sprays.  If you were prescribed an antibiotic medicine, take it as told by your doctor. Do not stop taking the antibiotic even if you start to feel better.  Hydrate and humidify    Drink enough water to keep your pee (urine) pale yellow.  Use a cool mist humidifier to keep the humidity level in your home above 50%.  Breathe in steam for 10-15 minutes, 3-4 times a day, or as told by your doctor. You can do this in the bathroom while a hot shower is running.  Try not to spend time in cool or dry air.  Rest  Rest as much as you can.  Sleep with your head raised (elevated).  Make sure you get enough sleep each night.  General instructions    Put a warm, moist washcloth on your face 3-4 times a day, or as often as told by your doctor. This will help with discomfort.  Wash your hands often with soap and water. If there is no soap and water, use hand .  Do not smoke. Avoid being around people who are smoking (secondhand smoke).  Keep all follow-up visits as told by your doctor. This is important.  Contact a doctor if:  You have a fever.  Your symptoms get worse.  Your symptoms do not get better within 10 days.  Get help right away  "if:  You have a very bad headache.  You cannot stop throwing up (vomiting).  You have very bad pain or swelling around your face or eyes.  You have trouble seeing.  You feel confused.  Your neck is stiff.  You have trouble breathing.  Summary  Sinusitis is swelling of your sinuses. Sinuses are hollow spaces in the bones around your face.  This condition is caused by tissues in your nose that become inflamed or swollen. This traps germs. These can lead to infection.  If you were prescribed an antibiotic medicine, take it as told by your doctor. Do not stop taking it even if you start to feel better.  Keep all follow-up visits as told by your doctor. This is important.  This information is not intended to replace advice given to you by your health care provider. Make sure you discuss any questions you have with your health care provider.  Document Revised: 05/20/2019 Document Reviewed: 05/20/2019  [employer] Patient Education © 2022 [employer]  Hypertension, Adult  High blood pressure (hypertension) is when the force of blood pumping through the arteries is too strong. The arteries are the blood vessels that carry blood from the heart throughout the body. Hypertension forces the heart to work harder to pump blood and may cause arteries to become narrow or stiff. Untreated or uncontrolled hypertension can cause a heart attack, heart failure, a stroke, kidney disease, and other problems.  A blood pressure reading consists of a higher number over a lower number. Ideally, your blood pressure should be below 120/80. The first (\"top\") number is called the systolic pressure. It is a measure of the pressure in your arteries as your heart beats. The second (\"bottom\") number is called the diastolic pressure. It is a measure of the pressure in your arteries as the heart relaxes.  What are the causes?  The exact cause of this condition is not known. There are some conditions that result in or are related to high blood " pressure.  What increases the risk?  Some risk factors for high blood pressure are under your control. The following factors may make you more likely to develop this condition:  Smoking.  Having type 2 diabetes mellitus, high cholesterol, or both.  Not getting enough exercise or physical activity.  Being overweight.  Having too much fat, sugar, calories, or salt (sodium) in your diet.  Drinking too much alcohol.  Some risk factors for high blood pressure may be difficult or impossible to change. Some of these factors include:  Having chronic kidney disease.  Having a family history of high blood pressure.  Age. Risk increases with age.  Race. You may be at higher risk if you are .  Gender. Men are at higher risk than women before age 45. After age 65, women are at higher risk than men.  Having obstructive sleep apnea.  Stress.  What are the signs or symptoms?  High blood pressure may not cause symptoms. Very high blood pressure (hypertensive crisis) may cause:  Headache.  Anxiety.  Shortness of breath.  Nosebleed.  Nausea and vomiting.  Vision changes.  Severe chest pain.  Seizures.  How is this diagnosed?  This condition is diagnosed by measuring your blood pressure while you are seated, with your arm resting on a flat surface, your legs uncrossed, and your feet flat on the floor. The cuff of the blood pressure monitor will be placed directly against the skin of your upper arm at the level of your heart. It should be measured at least twice using the same arm. Certain conditions can cause a difference in blood pressure between your right and left arms.  Certain factors can cause blood pressure readings to be lower or higher than normal for a short period of time:  When your blood pressure is higher when you are in a health care provider's office than when you are at home, this is called white coat hypertension. Most people with this condition do not need medicines.  When your blood pressure is  higher at home than when you are in a health care provider's office, this is called masked hypertension. Most people with this condition may need medicines to control blood pressure.  If you have a high blood pressure reading during one visit or you have normal blood pressure with other risk factors, you may be asked to:  Return on a different day to have your blood pressure checked again.  Monitor your blood pressure at home for 1 week or longer.  If you are diagnosed with hypertension, you may have other blood or imaging tests to help your health care provider understand your overall risk for other conditions.  How is this treated?  This condition is treated by making healthy lifestyle changes, such as eating healthy foods, exercising more, and reducing your alcohol intake. Your health care provider may prescribe medicine if lifestyle changes are not enough to get your blood pressure under control, and if:  Your systolic blood pressure is above 130.  Your diastolic blood pressure is above 80.  Your personal target blood pressure may vary depending on your medical conditions, your age, and other factors.  Follow these instructions at home:  Eating and drinking    Eat a diet that is high in fiber and potassium, and low in sodium, added sugar, and fat. An example eating plan is called the DASH (Dietary Approaches to Stop Hypertension) diet. To eat this way:  Eat plenty of fresh fruits and vegetables. Try to fill one half of your plate at each meal with fruits and vegetables.  Eat whole grains, such as whole-wheat pasta, brown rice, or whole-grain bread. Fill about one fourth of your plate with whole grains.  Eat or drink low-fat dairy products, such as skim milk or low-fat yogurt.  Avoid fatty cuts of meat, processed or cured meats, and poultry with skin. Fill about one fourth of your plate with lean proteins, such as fish, chicken without skin, beans, eggs, or tofu.  Avoid pre-made and processed foods. These tend to  be higher in sodium, added sugar, and fat.  Reduce your daily sodium intake. Most people with hypertension should eat less than 1,500 mg of sodium a day.  Do not drink alcohol if:  Your health care provider tells you not to drink.  You are pregnant, may be pregnant, or are planning to become pregnant.  If you drink alcohol:  Limit how much you use to:  0-1 drink a day for women.  0-2 drinks a day for men.  Be aware of how much alcohol is in your drink. In the [place] one drink equals one 12 oz bottle of beer (355 mL), one 5 oz glass of wine (148 mL), or one 1½ oz glass of hard liquor (44 mL).  Lifestyle    Work with your health care provider to maintain a healthy body weight or to lose weight. Ask what an ideal weight is for you.  Get at least 30 minutes of exercise most days of the week. Activities may include walking, swimming, or biking.  Include exercise to strengthen your muscles (resistance exercise), such as Pilates or lifting weights, as part of your weekly exercise routine. Try to do these types of exercises for 30 minutes at least 3 days a week.  Do not use any products that contain nicotine or tobacco, such as cigarettes, e-cigarettes, and chewing tobacco. If you need help quitting, ask your health care provider.  Monitor your blood pressure at home as told by your health care provider.  Keep all follow-up visits as told by your health care provider. This is important.  Medicines  Take over-the-counter and prescription medicines only as told by your health care provider. Follow directions carefully. Blood pressure medicines must be taken as prescribed.  Do not skip doses of blood pressure medicine. Doing this puts you at risk for problems and can make the medicine less effective.  Ask your health care provider about side effects or reactions to medicines that you should watch for.  Contact a health care provider if you:  Think you are having a reaction to a medicine you are taking.  Have headaches that keep  coming back (recurring).  Feel dizzy.  Have swelling in your ankles.  Have trouble with your vision.  Get help right away if you:  Develop a severe headache or confusion.  Have unusual weakness or numbness.  Feel faint.  Have severe pain in your chest or abdomen.  Vomit repeatedly.  Have trouble breathing.  Summary  Hypertension is when the force of blood pumping through your arteries is too strong. If this condition is not controlled, it may put you at risk for serious complications.  Your personal target blood pressure may vary depending on your medical conditions, your age, and other factors. For most people, a normal blood pressure is less than 120/80.  Hypertension is treated with lifestyle changes, medicines, or a combination of both. Lifestyle changes include losing weight, eating a healthy, low-sodium diet, exercising more, and limiting alcohol.  This information is not intended to replace advice given to you by your health care provider. Make sure you discuss any questions you have with your health care provider.  Document Revised: 08/28/2019 Document Reviewed: 08/28/2019  [employer] Patient Education © 2022 [employer]  Type 2 Diabetes Mellitus, Diagnosis, Adult  Type 2 diabetes (type 2 diabetes mellitus) is a long-term, or chronic, disease. In type 2 diabetes, one or both of these problems may be present:  The pancreas does not make enough of a hormone called insulin.  Cells in the body do not respond properly to the insulin that the body makes (insulin resistance).  Normally, insulin allows blood sugar (glucose) to enter cells in the body. The cells use glucose for energy. Insulin resistance or lack of insulin causes excess glucose to build up in the blood instead of going into cells. This causes high blood glucose (hyperglycemia).   What are the causes?  The exact cause of type 2 diabetes is not known.  What increases the risk?  The following factors may make you more likely to develop this  condition:  Having a family member with type 2 diabetes.  Being overweight or obese.  Being inactive (sedentary).  Having been diagnosed with insulin resistance.  Having a history of prediabetes, diabetes when you were pregnant (gestational diabetes), or polycystic ovary syndrome (PCOS).  What are the signs or symptoms?  In the early stage of this condition, you may not have symptoms. Symptoms develop slowly and may include:  Increased thirst or hunger.  Increased urination.  Unexplained weight loss.  Tiredness (fatigue) or weakness.  Vision changes, such as blurry vision.  Dark patches on the skin.  How is this diagnosed?  This condition is diagnosed based on your symptoms, your medical history, a physical exam, and your blood glucose level. Your blood glucose may be checked with one or more of the following blood tests:  A fasting blood glucose (FBG) test. You will not be allowed to eat (you will fast) for 8 hours or longer before a blood sample is taken.  A random blood glucose test. This test checks blood glucose at any time of day regardless of when you ate.  An A1C (hemoglobin A1C) blood test. This test provides information about blood glucose levels over the previous 2-3 months.  An oral glucose tolerance test (OGTT). This test measures your blood glucose at two times:  After fasting. This is your baseline blood glucose level.  Two hours after drinking a beverage that contains glucose.  You may be diagnosed with type 2 diabetes if:  Your fasting blood glucose level is 126 mg/dL (7.0 mmol/L) or higher.  Your random blood glucose level is 200 mg/dL (11.1 mmol/L) or higher.  Your A1C level is 6.5% or higher.  Your oral glucose tolerance test result is higher than 200 mg/dL (11.1 mmol/L).  These blood tests may be repeated to confirm your diagnosis.  How is this treated?  Your treatment may be managed by a specialist called an endocrinologist. Type 2 diabetes may be treated by following instructions from your  health care provider about:  Making dietary and lifestyle changes. These may include:  Following a personalized nutrition plan that is developed by a registered dietitian.  Exercising regularly.  Finding ways to manage stress.  Checking your blood glucose level as often as told.  Taking diabetes medicines or insulin daily. This helps to keep your blood glucose levels in the healthy range.  Taking medicines to help prevent complications from diabetes. Medicines may include:  Aspirin.  Medicine to lower cholesterol.  Medicine to control blood pressure.  Your health care provider will set treatment goals for you. Your goals will be based on your age, other medical conditions you have, and how you respond to diabetes treatment. Generally, the goal of treatment is to maintain the following blood glucose levels:  Before meals:  mg/dL (4.4-7.2 mmol/L).  After meals: below 180 mg/dL (10 mmol/L).  A1C level: less than 7%.  Follow these instructions at home:  Questions to ask your health care provider  Consider asking the following questions:  Should I meet with a certified diabetes care and ?  What diabetes medicines do I need, and when should I take them?  What equipment will I need to manage my diabetes at home?  How often do I need to check my blood glucose?  Where can I find a support group for people with diabetes?  What number can I call if I have questions?  When is my next appointment?  General instructions  Take over-the-counter and prescription medicines only as told by your health care provider.  Keep all follow-up visits. This is important.  Where to find more information  For help and guidance and for more information about diabetes, please visit:  American Diabetes Association (ADA): www.diabetes.org  American Association of Diabetes Care and Education Specialists (ADCES): www.diabeteseducator.org  International Diabetes Federation (IDF): www.idf.org  Contact a health care provider  if:  Your blood glucose is at or above 240 mg/dL (13.3 mmol/L) for 2 days in a row.  You have been sick or have had a fever for 2 days or longer, and you are not getting better.  You have any of the following problems for more than 6 hours:  You cannot eat or drink.  You have nausea and vomiting.  You have diarrhea.  Get help right away if:  You have severe hypoglycemia. This means your blood glucose is lower than 54 mg/dL (3.0 mmol/L).  You become confused or you have trouble thinking clearly.  You have difficulty breathing.  You have moderate or large ketone levels in your urine.  These symptoms may represent a serious problem that is an emergency. Do not wait to see if the symptoms will go away. Get medical help right away. Call your local emergency services (911 in the [place] Do not drive yourself to the hospital.  Summary  Type 2 diabetes mellitus is a long-term, or chronic, disease. In type 2 diabetes, the pancreas does not make enough of a hormone called insulin, or cells in the body do not respond properly to insulin that the body makes.  This condition is treated by making dietary and lifestyle changes and taking diabetes medicines or insulin.  Your health care provider will set treatment goals for you. Your goals will be based on your age, other medical conditions you have, and how you respond to diabetes treatment.  Keep all follow-up visits. This is important.  This information is not intended to replace advice given to you by your health care provider. Make sure you discuss any questions you have with your health care provider.  Document Revised: 03/14/2022 Document Reviewed: 03/14/2022  [employer] Patient Education © 2022 [employer]

## 2023-02-17 NOTE — PROGRESS NOTES
"Chief Complaint  Ear Drainage (Bloody discharge from left ear), Nasal Congestion (2 wks duration), Headache, Fatigue, and Dizziness (Gets worse the longer he stands)    Van Henry presents to [facility]  History of Present Illness  The patient is a 72 y.o. male who is here because of congestion, post nasal drainage and occasional cough for 2 weeks. He denies any feve, chills or body aches. He is also here for follow up on his hypertension and diabetes. He states that [facility] started him on Chlorthalidone and he did not get refill on Amlodipine. Right now the patient is only taking chlrothalidone and coreg. He does not need refills on any of his medications at this time.  The patient denies any chest pains, palpitations, shortness of breath, headaches, fatigue, nausea, vomiting, diarrhea or any abdominal pain.      Objective   Vital Signs:  /90 (BP Location: Left arm, Patient Position: Sitting, Cuff Size: Adult)   Pulse 87   Temp 97.1 °F (36.2 °C)   Ht 188 cm (74\")   Wt 103 kg (228 lb)   SpO2 99%   BMI 29.27 kg/m²   Estimated body mass index is 29.27 kg/m² as calculated from the following:    Height as of this encounter: 188 cm (74\").    Weight as of this encounter: 103 kg (228 lb).       BMI is >= 25 and <30. (Overweight) The following options were offered after discussion;: exercise counseling/recommendations and nutrition counseling/recommendations      Physical Exam  Vitals and nursing note reviewed.   Constitutional:       General: He is not in acute distress.     Appearance: Normal appearance. He is well-developed and well-groomed.   HENT:      Head: Normocephalic and atraumatic.      Jaw: No tenderness or pain on movement.      Salivary Glands: Right salivary gland is not diffusely enlarged. Left salivary gland is not diffusely enlarged.      Right Ear: Tympanic membrane and ear canal normal.      Left Ear: Tympanic membrane and ear canal " normal.      Nose: Congestion and rhinorrhea present.      Right Sinus: Maxillary sinus tenderness and frontal sinus tenderness present.      Left Sinus: Maxillary sinus tenderness and frontal sinus tenderness present.      Mouth/Throat:      Mouth: Mucous membranes are moist.      Pharynx: No oropharyngeal exudate or posterior oropharyngeal erythema.   Eyes:      General: Lids are normal. No allergic shiner or scleral icterus.     Conjunctiva/sclera: Conjunctivae normal.      Pupils: Pupils are equal, round, and reactive to light.   Neck:      Thyroid: No thyroid mass, thyromegaly or thyroid tenderness.      Trachea: Trachea normal.   Cardiovascular:      Rate and Rhythm: Normal rate and regular rhythm.  No extrasystoles are present.     Pulses: Normal pulses.      Heart sounds: Normal heart sounds. No murmur heard.  Pulmonary:      Effort: Pulmonary effort is normal. No respiratory distress.      Breath sounds: Normal breath sounds. No decreased breath sounds, wheezing, rhonchi or rales.   Chest:   Breasts:     Right: Normal.      Left: Normal.   Abdominal:      General: Bowel sounds are normal.      Palpations: Abdomen is soft.      Tenderness: There is no abdominal tenderness. There is no right CVA tenderness, left CVA tenderness, guarding or rebound.   Musculoskeletal:      Cervical back: Neck supple.      Right lower leg: No edema.      Left lower leg: No edema.   Lymphadenopathy:      Cervical: No cervical adenopathy.      Upper Body:      Right upper body: No supraclavicular or axillary adenopathy.      Left upper body: No supraclavicular or axillary adenopathy.   Skin:     General: Skin is warm.      Nails: There is no clubbing.   Neurological:      General: No focal deficit present.      Mental Status: He is alert and oriented to person, place, and time.      Sensory: Sensation is intact.      Motor: Motor function is intact.      Coordination: Coordination is intact.   Psychiatric:         Attention and  Perception: Attention and perception normal.         Mood and Affect: Mood normal.         Speech: Speech normal.         Behavior: Behavior normal. Behavior is cooperative.         Thought Content: Thought content normal.        Result Review :                   Assessment and Plan   Diagnoses and all orders for this visit:    1. Acute non-recurrent maxillary sinusitis (Primary)  -     amoxicillin (AMOXIL) 875 MG tablet; Take 1 tablet by mouth 2 (Two) Times a Day for 10 days.  Dispense: 20 tablet; Refill: 0  -     meclizine (ANTIVERT) 25 MG tablet; Take 1 tablet by mouth 3 (Three) Times a Day As Needed for Dizziness for up to 5 days.  Dispense: 15 tablet; Refill: 0  -     dexamethasone (DECADRON) injection 4 mg    2. Acute non-recurrent frontal sinusitis  -     amoxicillin (AMOXIL) 875 MG tablet; Take 1 tablet by mouth 2 (Two) Times a Day for 10 days.  Dispense: 20 tablet; Refill: 0  -     meclizine (ANTIVERT) 25 MG tablet; Take 1 tablet by mouth 3 (Three) Times a Day As Needed for Dizziness for up to 5 days.  Dispense: 15 tablet; Refill: 0  -     dexamethasone (DECADRON) injection 4 mg    3. Primary hypertension  Assessment & Plan:  Hypertension is improving with treatment.  Continue current treatment regimen.  Weight loss.  Continue current medications.  Blood pressure will be reassessed at the next regular appointment.  [facility]      4. Type 2 diabetes mellitus with diabetic neuropathy, with long-term current use of insulin ([facility])  Assessment & Plan:  Diabetes is Stable.   Continue current treatment regimen.  Dietary recommendations for ADA diet.  Diabetes will be reassessed in 6 months.    Orders:  -     POC Glycosylated Hemoglobin (Hb A1C)         I spent 20 minutes caring for Panda on this date of service. This time includes time spent by me in the following activities:preparing for the visit, performing a medically appropriate examination and/or evaluation ,  counseling and educating the patient/family/caregiver, ordering medications, tests, or procedures and documenting information in the medical record     Follow Up   Return in about 7 months (around 9/28/2023) for Annual physical, Medicare Wellness, Blood work.  Patient was given instructions and counseling regarding his condition or for health maintenance advice. Please see specific information pulled into the AVS if appropriate.     The patient does not need refills on any of his medications. The patient is advised to continue all of his regular medications as prescribed. He was counseled regarding the importance of diet, exercise and medication compliance.      This document has been electronically signed by Alberto Green MD  February 17, 2023 11:45 EST

## 2023-02-17 NOTE — ASSESSMENT & PLAN NOTE
Diabetes is Stable.   Continue current treatment regimen.  Dietary recommendations for ADA diet.  Diabetes will be reassessed in 6 months.

## 2023-02-28 ENCOUNTER — OFFICE VISIT (OUTPATIENT)
Dept: CARDIOLOGY | Facility: CLINIC | Age: 73
End: 2023-02-28
Payer: MEDICARE

## 2023-02-28 VITALS
DIASTOLIC BLOOD PRESSURE: 78 MMHG | OXYGEN SATURATION: 98 % | BODY MASS INDEX: 27.9 KG/M2 | WEIGHT: 224.4 LBS | HEART RATE: 94 BPM | HEIGHT: 75 IN | SYSTOLIC BLOOD PRESSURE: 162 MMHG

## 2023-02-28 DIAGNOSIS — I10 PRIMARY HYPERTENSION: ICD-10-CM

## 2023-02-28 DIAGNOSIS — I25.10 CORONARY ARTERY DISEASE INVOLVING NATIVE CORONARY ARTERY OF NATIVE HEART WITHOUT ANGINA PECTORIS: Primary | ICD-10-CM

## 2023-02-28 DIAGNOSIS — E78.2 MIXED HYPERLIPIDEMIA: ICD-10-CM

## 2023-02-28 PROCEDURE — 99214 OFFICE O/P EST MOD 30 MIN: CPT | Performed by: INTERNAL MEDICINE

## 2023-02-28 RX ORDER — SALSALATE 750 MG
750 TABLET ORAL 2 TIMES DAILY
COMMUNITY

## 2023-02-28 RX ORDER — CARBOXYMETHYLCELLULOSE SODIUM 5 MG/ML
SOLUTION/ DROPS OPHTHALMIC 3 TIMES DAILY PRN
COMMUNITY

## 2023-02-28 RX ORDER — DOXEPIN HYDROCHLORIDE 100 MG/1
100 CAPSULE ORAL
COMMUNITY
Start: 2023-02-17

## 2023-02-28 RX ORDER — LORATADINE 10 MG/1
10 TABLET ORAL DAILY
COMMUNITY

## 2023-02-28 RX ORDER — CARVEDILOL 25 MG/1
25 TABLET ORAL 2 TIMES DAILY WITH MEALS
Qty: 180 TABLET | Refills: 3 | Status: SHIPPED | OUTPATIENT
Start: 2023-02-28

## 2023-03-30 RX ORDER — SACUBITRIL AND VALSARTAN 24; 26 MG/1; MG/1
TABLET, FILM COATED ORAL
Qty: 60 TABLET | Refills: 5 | Status: SHIPPED | OUTPATIENT
Start: 2023-03-30

## 2023-04-02 DIAGNOSIS — I10 PRIMARY HYPERTENSION: ICD-10-CM

## 2023-04-03 RX ORDER — CARVEDILOL 6.25 MG/1
TABLET ORAL
Qty: 180 TABLET | Refills: 0 | OUTPATIENT
Start: 2023-04-03

## 2023-04-23 DIAGNOSIS — F41.1 GENERALIZED ANXIETY DISORDER: ICD-10-CM

## 2023-04-24 RX ORDER — ESCITALOPRAM OXALATE 20 MG/1
TABLET ORAL
Qty: 225 TABLET | Refills: 0 | Status: SHIPPED | OUTPATIENT
Start: 2023-04-24

## 2023-04-25 ENCOUNTER — OFFICE VISIT (OUTPATIENT)
Dept: NEUROSURGERY | Facility: CLINIC | Age: 73
End: 2023-04-25
Payer: MEDICARE

## 2023-04-25 VITALS
TEMPERATURE: 97.1 F | HEIGHT: 74 IN | DIASTOLIC BLOOD PRESSURE: 80 MMHG | SYSTOLIC BLOOD PRESSURE: 144 MMHG | WEIGHT: 229.8 LBS | BODY MASS INDEX: 29.49 KG/M2

## 2023-04-25 DIAGNOSIS — M54.50 CHRONIC MIDLINE LOW BACK PAIN WITHOUT SCIATICA: ICD-10-CM

## 2023-04-25 DIAGNOSIS — G89.29 CHRONIC MIDLINE LOW BACK PAIN WITHOUT SCIATICA: ICD-10-CM

## 2023-04-25 DIAGNOSIS — R53.1 GENERALIZED WEAKNESS: ICD-10-CM

## 2023-04-25 DIAGNOSIS — M54.2 CHRONIC NECK PAIN: ICD-10-CM

## 2023-04-25 DIAGNOSIS — M47.12 CERVICAL SPONDYLOSIS WITH MYELOPATHY: Primary | ICD-10-CM

## 2023-04-25 DIAGNOSIS — G89.29 CHRONIC NECK PAIN: ICD-10-CM

## 2023-04-25 PROCEDURE — 1160F RVW MEDS BY RX/DR IN RCRD: CPT | Performed by: PHYSICIAN ASSISTANT

## 2023-04-25 PROCEDURE — 3079F DIAST BP 80-89 MM HG: CPT | Performed by: PHYSICIAN ASSISTANT

## 2023-04-25 PROCEDURE — 99204 OFFICE O/P NEW MOD 45 MIN: CPT | Performed by: PHYSICIAN ASSISTANT

## 2023-04-25 PROCEDURE — 1159F MED LIST DOCD IN RCRD: CPT | Performed by: PHYSICIAN ASSISTANT

## 2023-04-25 PROCEDURE — 3077F SYST BP >= 140 MM HG: CPT | Performed by: PHYSICIAN ASSISTANT

## 2023-04-25 NOTE — PROGRESS NOTES
Patient: Panda Henry  : 1950  Chart #: [id number]    Date of Service: 2023    CHIEF COMPLAINT: Neck and arm pain with sensory alteration     History of Present Illness Mr. Henry is a 72-year-old retired  with a past medical history significant for CAD (s/p stenting- most recent was with Dr Bansal in ) on plavix, HTN, HLD, and chronic back pain.  His primary concern today is pain in the dorsal neck that extends into the shoulders bilaterally and all the way to the elbow.  Pain also goes all the way down his spine.  He has numbness in his hands.  The left thumb is particularly bothersome in the right long and ring finger.  He also complains of some unsteady gait.  Lately he has been feeling very off balance and has had a few falls.  He feels like he is constantly dizzy but he also feels like he does not have control of his legs.  Symptoms have been progressing over the past 5 to 6 years.  He has been treated with formal physical therapy.  He has also undergone epidural injections.  Nothing is providing lasting relief.  He was told he needed cervical surgery and is here for second opinion.      Past Medical History:   Diagnosis Date   • Arthritis    • Asthma    • Cancer     basal cell carcinoma   • Cervical disc disorder    • COPD (chronic obstructive pulmonary disease)    • Coronary artery disease    • Depression    • Diabetes mellitus    • GERD (gastroesophageal reflux disease)    • Headache    • Hyperlipidemia    • Hypertension    • Injury of back    • Low back pain    • Lumbosacral disc disease    • Neuropathy    • Peripheral neuropathy    • Pulmonary arterial hypertension    • Spinal stenosis    • Thoracic disc disorder          Current Outpatient Medications:   •  albuterol sulfate  (90 Base) MCG/ACT inhaler, Inhale 2 puffs Every 4 (Four) Hours As Needed for Wheezing., Disp: , Rfl:   •  aspirin 81 MG EC tablet, Take 1 tablet by mouth Daily., Disp: , Rfl:    •  budesonide-formoterol (SYMBICORT) 160-4.5 MCG/ACT inhaler, Inhale 2 puffs 2 (Two) Times a Day for 30 days., Disp: 10.6 g, Rfl: 2  •  carboxymethylcellulose (REFRESH PLUS) 0.5 % solution, 3 (Three) Times a Day As Needed for Dry Eyes., Disp: , Rfl:   •  carvedilol (COREG) 25 MG tablet, Take 1 tablet by mouth 2 (Two) Times a Day With Meals., Disp: 180 tablet, Rfl: 3  •  chlorthalidone (HYGROTON) 25 MG tablet, Take 12.5 mg by mouth Daily. 1/2 tablet in morning, Disp: , Rfl:   •  clopidogrel (PLAVIX) 75 MG tablet, Take 1 tablet by mouth Daily., Disp: 90 tablet, Rfl: 3  •  diclofenac (VOLTAREN) 75 MG EC tablet, TAKE 1 TABLET BY MOUTH 2 TIMES A DAY FOR 30 DAYS., Disp: 60 tablet, Rfl: 0  •  doxepin (SINEquan) 100 MG capsule, Take 1 capsule by mouth every night at bedtime., Disp: , Rfl:   •  Entresto 24-26 MG tablet, TAKE 1 TABLET BY MOUTH TWICE A DAY, Disp: 60 tablet, Rfl: 5  •  escitalopram (LEXAPRO) 20 MG tablet, TAKE 2 & 1/2 TABLETS BY MOUTH DAILY 90 DAYS, Disp: 225 tablet, Rfl: 0  •  ezetimibe (ZETIA) 10 MG tablet, Take 1 tablet by mouth Daily., Disp: , Rfl:   •  finasteride (PROSCAR) 5 MG tablet, Take 1 tablet by mouth Daily., Disp: , Rfl:   •  gabapentin (NEURONTIN) 300 MG capsule, 1 capsule Daily., Disp: , Rfl:   •  glucose 4-6 GM-MG per chewable tablet, Chew 2 tablets As Needed for Low Blood Sugar., Disp: , Rfl:   •  insulin aspart (novoLOG FLEXPEN) 100 UNIT/ML solution pen-injector sc pen, Inject 14 Units under the skin into the appropriate area as directed 2 (Two) Times a Day With Meals. 16 units in the am, 14 units in the Pm, Disp: , Rfl:   •  insulin glargine (LANTUS, SEMGLEE) 100 UNIT/ML injection, Inject 54 Units under the skin into the appropriate area as directed Daily., Disp: , Rfl:   •  ketotifen (ZADITOR) 0.025 % ophthalmic solution, 1 drop 2 (Two) Times a Day., Disp: , Rfl:   •  loratadine (CLARITIN) 10 MG tablet, Take 1 tablet by mouth Daily., Disp: , Rfl:   •  LORazepam (ATIVAN) 2 MG tablet, Take  1 tablet by mouth 2 (Two) Times a Day., Disp: , Rfl:   •  lovastatin (MEVACOR) 40 MG tablet, Take 1 tablet by mouth Every Night for 30 days., Disp: 30 tablet, Rfl: 2  •  montelukast (SINGULAIR) 10 MG tablet, Take 1 tablet by mouth Every Night., Disp: , Rfl:   •  nitroglycerin (NITROSTAT) 0.4 MG SL tablet, Place 1 tablet under the tongue Every 5 (Five) Minutes As Needed for Chest Pain. Take no more than 3 doses in 15 minutes., Disp: , Rfl:   •  omeprazole (priLOSEC) 40 MG capsule, TAKE 1 CAPSULE BY MOUTH EVERY DAY 30 MINUTES BEFORE MORNING MEAL, Disp: , Rfl:   •  salsalate (DISALCID) 750 MG tablet, Take 1 tablet by mouth 2 (Two) Times a Day., Disp: , Rfl:   •  tamsulosin (FLOMAX) 0.4 MG capsule 24 hr capsule, Take 1 capsule by mouth Daily., Disp: , Rfl:     Past Surgical History:   Procedure Laterality Date   • BLEPHAROPLASTY Bilateral    • CARDIAC CATHETERIZATION     • CARDIAC CATHETERIZATION      11/30/2022 per Dr. Bansal   • CARDIAC CATHETERIZATION Left 11/30/2022    Procedure: Left Heart Cath;  Surgeon: Moisés Bansal MD;  Location: [facility] CATH INVASIVE LOCATION;  Service: Cardiovascular;  Laterality: Left;   • CORONARY STENT PLACEMENT     • SHOULDER ROTATOR CUFF REPAIR Right    • SINUPLASTY     • SKIN BIOPSY     • TRIGGER POINT INJECTION         Social History     Socioeconomic History   • Marital status:    Tobacco Use   • Smoking status: Never   • Smokeless tobacco: Current     Types: Chew   Vaping Use   • Vaping Use: Never used   Substance and Sexual Activity   • Alcohol use: Not Currently     Comment: Drank some years ago   • Drug use: Never   • Sexual activity: Not Currently     Partners: Female         Review of Systems   Constitutional: Positive for fatigue.   HENT: Positive for hearing loss and postnasal drip.    Eyes: Positive for itching.   Respiratory: Positive for shortness of breath.    Cardiovascular: Negative.    Gastrointestinal: Negative.    Endocrine: Negative.    Genitourinary: Positive  "for genital sores.   Musculoskeletal: Positive for back pain, gait problem, neck pain and neck stiffness.   Skin: Negative.    Allergic/Immunologic: Positive for environmental allergies.   Neurological: Positive for dizziness, tremors, weakness, light-headedness, numbness and headaches.   Hematological: Negative.    Psychiatric/Behavioral: Positive for agitation, confusion and decreased concentration. The patient is nervous/anxious.    All other systems reviewed and are negative.      Objective   Vital Signs: Blood pressure 144/80, temperature 97.1 °F (36.2 °C), temperature source Infrared, height 188 cm (74\"), weight 104 kg (229 lb 12.8 oz).  Physical Exam  Vitals and nursing note reviewed.   Constitutional:       General: He is not in acute distress.     Appearance: He is well-developed.   HENT:      Head: Normocephalic and atraumatic.   Pulmonary:      Breath sounds: Normal breath sounds.   Psychiatric:         Behavior: Behavior normal.         Thought Content: Thought content normal.     Musculoskeletal:     Strength is intact in upper and lower extremities to direct testing.     Station and gait are normal.  Neurologic:     Muscle tone is normal throughout.     Coordination is intact.     Deep tendon reflexes: 3+ bicep, 2+ tricep.  Difficult to elicit in the lower extremities     Sensation is intact to light touch throughout.     Patient is oriented to person, place, and time.     Yvonne sign negative.  No ankle clonus       Independent review of radiographic imaging: CT of the cervical spine dated 4/1/2022 demonstrates multilevel spondylosis.  There is extensive anterior and posterior osteophytes most pronounced at C5-6 where there is a large posterior calcification narrowing the central canal.    Assessment & Plan   Diagnosis:   1. Cervical spondylosis with concern for myelopathy  2. Chronic mechanical back pain  3. Balance disturbance    Medical Decision Making: I have referred patient for an MRI of the " cervical spine without gadolinium. Continued conservatism for his mechanical back pain.  He will follow-up with me to review and further recommendations will be made at that time.    Diagnoses and all orders for this visit:    1. Cervical spondylosis with myelopathy (Primary)  -     MRI Cervical Spine Without Contrast; Future    2. Generalized weakness    3. Chronic neck pain    4. Chronic midline low back pain without sciatica    5. BMI 29.0-29.9,adult                        BMI is >= 25 and <30. (Overweight) The following options were offered after discussion;: exercise counseling/recommendations         Juanita Rajan PA-C  Patient Care Team:  Alberto Green MD as PCP - General (Family Medicine)  Moisés Bansal MD as Consulting Physician (Cardiology)

## 2023-05-02 DIAGNOSIS — F41.1 GENERALIZED ANXIETY DISORDER: ICD-10-CM

## 2023-05-02 RX ORDER — ESCITALOPRAM OXALATE 20 MG/1
TABLET ORAL
Qty: 225 TABLET | Refills: 0 | OUTPATIENT
Start: 2023-05-02

## 2023-05-08 DIAGNOSIS — J44.9 CHRONIC OBSTRUCTIVE PULMONARY DISEASE, UNSPECIFIED COPD TYPE: ICD-10-CM

## 2023-05-08 RX ORDER — BUDESONIDE AND FORMOTEROL FUMARATE DIHYDRATE 160; 4.5 UG/1; UG/1
AEROSOL RESPIRATORY (INHALATION)
Qty: 10.2 EACH | Refills: 2 | Status: SHIPPED | OUTPATIENT
Start: 2023-05-08

## 2023-05-10 ENCOUNTER — OFFICE VISIT (OUTPATIENT)
Dept: NEUROSURGERY | Facility: CLINIC | Age: 73
End: 2023-05-10
Payer: MEDICARE

## 2023-05-10 ENCOUNTER — TELEPHONE (OUTPATIENT)
Dept: CARDIOLOGY | Facility: CLINIC | Age: 73
End: 2023-05-10
Payer: MEDICARE

## 2023-05-10 VITALS — RESPIRATION RATE: 18 BRPM | BODY MASS INDEX: 29.62 KG/M2 | TEMPERATURE: 97.7 F | HEIGHT: 74 IN | WEIGHT: 230.8 LBS

## 2023-05-10 DIAGNOSIS — M54.12 CERVICAL RADICULOPATHY: ICD-10-CM

## 2023-05-10 DIAGNOSIS — M47.12 CERVICAL SPONDYLOSIS WITH MYELOPATHY: Primary | ICD-10-CM

## 2023-05-10 PROCEDURE — 99214 OFFICE O/P EST MOD 30 MIN: CPT | Performed by: PHYSICIAN ASSISTANT

## 2023-05-10 RX ORDER — SODIUM CHLORIDE 9 MG/ML
100 INJECTION, SOLUTION INTRAVENOUS CONTINUOUS
OUTPATIENT
Start: 2023-05-10

## 2023-05-10 RX ORDER — FAMOTIDINE 10 MG
20 TABLET ORAL
OUTPATIENT
Start: 2023-05-10

## 2023-05-10 RX ORDER — GABAPENTIN 400 MG/1
1 CAPSULE ORAL 3 TIMES DAILY
COMMUNITY
Start: 2023-04-25

## 2023-05-10 RX ORDER — CHLORHEXIDINE GLUCONATE 4 G/100ML
SOLUTION TOPICAL
Qty: 120 ML | Refills: 0 | Status: SHIPPED | OUTPATIENT
Start: 2023-05-10

## 2023-05-10 NOTE — H&P (VIEW-ONLY)
Patient: Panda Henry  : 1950  Chart #: [id number]    Date of Service: 5/10/2023    CHIEF COMPLAINT: Neck and arm pain with sensory alteration     History of Present Illness Mr. Henry is a 72-year-old retired  with a past medical history significant for CAD (s/p stenting- most recent was with Dr Bansal in ) on plavix, HTN, HLD, and chronic back pain.  His primary concern today is pain in the dorsal neck that extends into the shoulders bilaterally and all the way to the elbow.  He has pain down his spine as well. He has numbness in his hands.  Both thumbs are numb and he has numbness in the right long and ring finger. Sometimes his whole hand will go numb. He has been told he has carpal tunnel syndrome.  He also complains of some unsteady gait.  Lately he has been feeling very off balance and has had a few falls.  He feels like he does not have control of his legs.  Symptoms have been progressing over the past 5 to 6 years.  He has been treated with formal physical therapy.  He has also undergone epidural injections.  Nothing is providing lasting relief.  He was told he needed cervical surgery and is here for second opinion.      Past Medical History:   Diagnosis Date   • Arthritis    • Asthma    • Cancer     basal cell carcinoma   • Cervical disc disorder    • COPD (chronic obstructive pulmonary disease)    • Coronary artery disease    • Depression    • Diabetes mellitus    • GERD (gastroesophageal reflux disease)    • Headache    • Hyperlipidemia    • Hypertension    • Injury of back    • Low back pain    • Lumbosacral disc disease    • Neuropathy    • Peripheral neuropathy    • Pulmonary arterial hypertension    • Spinal stenosis    • Thoracic disc disorder          Current Outpatient Medications:   •  albuterol sulfate  (90 Base) MCG/ACT inhaler, Inhale 2 puffs Every 4 (Four) Hours As Needed for Wheezing., Disp: , Rfl:   •  aspirin 81 MG EC tablet, Take 1  tablet by mouth Daily., Disp: , Rfl:   •  budesonide-formoterol (SYMBICORT) 160-4.5 MCG/ACT inhaler, INHALE 2 PUFFS TWICE A DAY FOR 30 DAYS, Disp: 10.2 each, Rfl: 2  •  carboxymethylcellulose (REFRESH PLUS) 0.5 % solution, 3 (Three) Times a Day As Needed for Dry Eyes., Disp: , Rfl:   •  carvedilol (COREG) 25 MG tablet, Take 1 tablet by mouth 2 (Two) Times a Day With Meals., Disp: 180 tablet, Rfl: 3  •  chlorhexidine (HIBICLENS) 4 % external liquid, Shower each day with solution for 5 days beginning 5 days before surgery., Disp: 120 mL, Rfl: 0  •  chlorthalidone (HYGROTON) 25 MG tablet, Take 12.5 mg by mouth Daily. 1/2 tablet in morning, Disp: , Rfl:   •  clopidogrel (PLAVIX) 75 MG tablet, Take 1 tablet by mouth Daily., Disp: 90 tablet, Rfl: 3  •  diclofenac (VOLTAREN) 75 MG EC tablet, TAKE 1 TABLET BY MOUTH 2 TIMES A DAY FOR 30 DAYS., Disp: 60 tablet, Rfl: 0  •  doxepin (SINEquan) 100 MG capsule, Take 1 capsule by mouth every night at bedtime., Disp: , Rfl:   •  Entresto 24-26 MG tablet, TAKE 1 TABLET BY MOUTH TWICE A DAY, Disp: 60 tablet, Rfl: 5  •  escitalopram (LEXAPRO) 20 MG tablet, TAKE 2 & 1/2 TABLETS BY MOUTH DAILY 90 DAYS, Disp: 225 tablet, Rfl: 0  •  ezetimibe (ZETIA) 10 MG tablet, Take 1 tablet by mouth Daily., Disp: , Rfl:   •  finasteride (PROSCAR) 5 MG tablet, Take 1 tablet by mouth Daily., Disp: , Rfl:   •  gabapentin (NEURONTIN) 400 MG capsule, Take 1 capsule by mouth 3 (Three) Times a Day., Disp: , Rfl:   •  glucose 4-6 GM-MG per chewable tablet, Chew 2 tablets As Needed for Low Blood Sugar., Disp: , Rfl:   •  insulin aspart (novoLOG FLEXPEN) 100 UNIT/ML solution pen-injector sc pen, Inject 14 Units under the skin into the appropriate area as directed 2 (Two) Times a Day With Meals. 16 units in the am, 14 units in the Pm, Disp: , Rfl:   •  insulin glargine (LANTUS, SEMGLEE) 100 UNIT/ML injection, Inject 54 Units under the skin into the appropriate area as directed Daily., Disp: , Rfl:   •  ketotifen  (ZADITOR) 0.025 % ophthalmic solution, 1 drop 2 (Two) Times a Day., Disp: , Rfl:   •  loratadine (CLARITIN) 10 MG tablet, Take 1 tablet by mouth Daily., Disp: , Rfl:   •  LORazepam (ATIVAN) 2 MG tablet, Take 1 tablet by mouth 2 (Two) Times a Day., Disp: , Rfl:   •  lovastatin (MEVACOR) 40 MG tablet, Take 1 tablet by mouth Every Night for 30 days., Disp: 30 tablet, Rfl: 2  •  montelukast (SINGULAIR) 10 MG tablet, Take 1 tablet by mouth Every Night., Disp: , Rfl:   •  mupirocin (BACTROBAN) 2 % nasal ointment, Apply to the inside of each nostril with a cotton swab two times daily, morning and evening, for 5 days before surgery., Disp: 10 each, Rfl: 0  •  nitroglycerin (NITROSTAT) 0.4 MG SL tablet, Place 1 tablet under the tongue Every 5 (Five) Minutes As Needed for Chest Pain. Take no more than 3 doses in 15 minutes., Disp: , Rfl:   •  omeprazole (priLOSEC) 40 MG capsule, TAKE 1 CAPSULE BY MOUTH EVERY DAY 30 MINUTES BEFORE MORNING MEAL, Disp: , Rfl:   •  salsalate (DISALCID) 750 MG tablet, Take 1 tablet by mouth 2 (Two) Times a Day., Disp: , Rfl:   •  tamsulosin (FLOMAX) 0.4 MG capsule 24 hr capsule, Take 1 capsule by mouth Daily., Disp: , Rfl:     Past Surgical History:   Procedure Laterality Date   • BLEPHAROPLASTY Bilateral    • CARDIAC CATHETERIZATION     • CARDIAC CATHETERIZATION      11/30/2022 per Dr. Bansal   • CARDIAC CATHETERIZATION Left 11/30/2022    Procedure: Left Heart Cath;  Surgeon: Moisés Bansal MD;  Location: [facility] INVASIVE LOCATION;  Service: Cardiovascular;  Laterality: Left;   • CORONARY STENT PLACEMENT     • SHOULDER ROTATOR CUFF REPAIR Right    • SINUPLASTY     • SKIN BIOPSY     • TRIGGER POINT INJECTION         Social History     Socioeconomic History   • Marital status:    Tobacco Use   • Smoking status: Never   • Smokeless tobacco: Current     Types: Chew   Vaping Use   • Vaping Use: Never used   Substance and Sexual Activity   • Alcohol use: Not Currently     Comment: Drank some  years ago   • Drug use: Never   • Sexual activity: Not Currently     Partners: Female         Review of Systems   Constitutional: Positive for fatigue. Negative for activity change, appetite change, chills, diaphoresis, fever and unexpected weight change.   HENT: Positive for hearing loss and postnasal drip. Negative for congestion, dental problem, drooling, ear discharge, ear pain, facial swelling, mouth sores, nosebleeds, rhinorrhea, sinus pressure, sneezing, sore throat, tinnitus, trouble swallowing and voice change.    Eyes: Positive for itching. Negative for photophobia, pain, discharge, redness and visual disturbance.   Respiratory: Positive for shortness of breath. Negative for apnea, cough, choking, chest tightness, wheezing and stridor.    Cardiovascular: Negative.  Negative for chest pain, palpitations and leg swelling.   Gastrointestinal: Negative.  Negative for abdominal distention, abdominal pain, anal bleeding, blood in stool, constipation, diarrhea, nausea, rectal pain and vomiting.   Endocrine: Negative.  Negative for cold intolerance, heat intolerance, polydipsia, polyphagia and polyuria.   Genitourinary: Positive for genital sores. Negative for decreased urine volume, difficulty urinating, dysuria, enuresis, flank pain, frequency, hematuria and urgency.   Musculoskeletal: Positive for back pain, gait problem, neck pain and neck stiffness. Negative for arthralgias, joint swelling and myalgias.   Skin: Negative.  Negative for color change, pallor, rash and wound.   Allergic/Immunologic: Positive for environmental allergies. Negative for food allergies and immunocompromised state.   Neurological: Positive for dizziness, tremors, weakness, light-headedness, numbness and headaches. Negative for seizures, syncope, facial asymmetry and speech difficulty.   Hematological: Negative.  Negative for adenopathy. Does not bruise/bleed easily.   Psychiatric/Behavioral: Positive for agitation, confusion and  "decreased concentration. Negative for behavioral problems, dysphoric mood, hallucinations, self-injury, sleep disturbance and suicidal ideas. The patient is nervous/anxious. The patient is not hyperactive.    All other systems reviewed and are negative.      Objective   Vital Signs: Temperature 97.7 °F (36.5 °C), temperature source Infrared, resp. rate 18, height 188 cm (74\"), weight 105 kg (230 lb 12.8 oz).  Physical Exam  Vitals and nursing note reviewed.   Constitutional:       General: He is not in acute distress.     Appearance: He is well-developed.   HENT:      Head: Normocephalic and atraumatic.   Pulmonary:      Breath sounds: Normal breath sounds.   Psychiatric:         Behavior: Behavior normal.         Thought Content: Thought content normal.     Musculoskeletal:     Strength is intact in upper and lower extremities to direct testing.     Station and gait are normal.  Neurologic:     Muscle tone is normal throughout.     Coordination is intact.     Deep tendon reflexes: 3+ bicep, 2+ tricep.  Difficult to elicit in the lower extremities     Sensation is intact to light touch throughout.     Patient is oriented to person, place, and time.     Yvonne sign negative.  No ankle clonus       Independent review of radiographic imaging: CT of the cervical spine dated 4/1/2022 demonstrates multilevel spondylosis.  There is extensive anterior and posterior osteophytes most pronounced at C5-6 where there is a large posterior calcification narrowing the central canal.    MRI of the cervical spine dated 5/4/2023 demonstrates disc osteophyte complex at C4-5 and C5 6.  There is moderate to severe canal stenosis at C5-6.  There is moderate canal narrowing at C4-5 with foraminal narrowing bilaterally.    Assessment & Plan   Diagnosis:   1.  Cervical spondylosis with radiculomyelopathy  2.  Chronic mechanical back pain  3.  Bilateral carpal tunnel syndrome    Medical Decision Making: Patient has a couple things going on " but the main concern is he has signs of an early myelopathy.  Dr. Olmos has recommended ACDF C4-5 and C5-6.  The goal of surgery is to prevent progression of his myelopathy.  I think this will help with some of his radicular symptoms as well.  Some of his numbness may be from carpal tunnel.  I discussed the general nature of the procedure as well as risk, limitations, complications, and alternatives to the procedure.  Patient will need formal cardiac clearance to come off of his Plavix.    Diagnoses and all orders for this visit:    1. Cervical spondylosis with myelopathy (Primary)      2. Cervical radiculopathy                 Juanita Rajan PA-C  Patient Care Team:  Alberto Green MD as PCP - General (Family Medicine)  Moisés Bansal MD as Consulting Physician (Cardiology)

## 2023-05-10 NOTE — H&P
Patient: Panda Henry  : 1950  Chart #: [id number]    Date of Service: 5/10/2023    CHIEF COMPLAINT: Neck and arm pain with sensory alteration     History of Present Illness Mr. Henry is a 72-year-old retired  with a past medical history significant for CAD (s/p stenting- most recent was with Dr Bansal in ) on plavix, HTN, HLD, and chronic back pain.  His primary concern today is pain in the dorsal neck that extends into the shoulders bilaterally and all the way to the elbow.  He has pain down his spine as well. He has numbness in his hands.  Both thumbs are numb and he has numbness in the right long and ring finger. Sometimes his whole hand will go numb. He has been told he has carpal tunnel syndrome.  He also complains of some unsteady gait.  Lately he has been feeling very off balance and has had a few falls.  He feels like he does not have control of his legs.  Symptoms have been progressing over the past 5 to 6 years.  He has been treated with formal physical therapy.  He has also undergone epidural injections.  Nothing is providing lasting relief.  He was told he needed cervical surgery and is here for second opinion.      Past Medical History:   Diagnosis Date   • Arthritis    • Asthma    • Cancer     basal cell carcinoma   • Cervical disc disorder    • COPD (chronic obstructive pulmonary disease)    • Coronary artery disease    • Depression    • Diabetes mellitus    • GERD (gastroesophageal reflux disease)    • Headache    • Hyperlipidemia    • Hypertension    • Injury of back    • Low back pain    • Lumbosacral disc disease    • Neuropathy    • Peripheral neuropathy    • Pulmonary arterial hypertension    • Spinal stenosis    • Thoracic disc disorder          Current Outpatient Medications:   •  albuterol sulfate  (90 Base) MCG/ACT inhaler, Inhale 2 puffs Every 4 (Four) Hours As Needed for Wheezing., Disp: , Rfl:   •  aspirin 81 MG EC tablet, Take 1  tablet by mouth Daily., Disp: , Rfl:   •  budesonide-formoterol (SYMBICORT) 160-4.5 MCG/ACT inhaler, INHALE 2 PUFFS TWICE A DAY FOR 30 DAYS, Disp: 10.2 each, Rfl: 2  •  carboxymethylcellulose (REFRESH PLUS) 0.5 % solution, 3 (Three) Times a Day As Needed for Dry Eyes., Disp: , Rfl:   •  carvedilol (COREG) 25 MG tablet, Take 1 tablet by mouth 2 (Two) Times a Day With Meals., Disp: 180 tablet, Rfl: 3  •  chlorhexidine (HIBICLENS) 4 % external liquid, Shower each day with solution for 5 days beginning 5 days before surgery., Disp: 120 mL, Rfl: 0  •  chlorthalidone (HYGROTON) 25 MG tablet, Take 12.5 mg by mouth Daily. 1/2 tablet in morning, Disp: , Rfl:   •  clopidogrel (PLAVIX) 75 MG tablet, Take 1 tablet by mouth Daily., Disp: 90 tablet, Rfl: 3  •  diclofenac (VOLTAREN) 75 MG EC tablet, TAKE 1 TABLET BY MOUTH 2 TIMES A DAY FOR 30 DAYS., Disp: 60 tablet, Rfl: 0  •  doxepin (SINEquan) 100 MG capsule, Take 1 capsule by mouth every night at bedtime., Disp: , Rfl:   •  Entresto 24-26 MG tablet, TAKE 1 TABLET BY MOUTH TWICE A DAY, Disp: 60 tablet, Rfl: 5  •  escitalopram (LEXAPRO) 20 MG tablet, TAKE 2 & 1/2 TABLETS BY MOUTH DAILY 90 DAYS, Disp: 225 tablet, Rfl: 0  •  ezetimibe (ZETIA) 10 MG tablet, Take 1 tablet by mouth Daily., Disp: , Rfl:   •  finasteride (PROSCAR) 5 MG tablet, Take 1 tablet by mouth Daily., Disp: , Rfl:   •  gabapentin (NEURONTIN) 400 MG capsule, Take 1 capsule by mouth 3 (Three) Times a Day., Disp: , Rfl:   •  glucose 4-6 GM-MG per chewable tablet, Chew 2 tablets As Needed for Low Blood Sugar., Disp: , Rfl:   •  insulin aspart (novoLOG FLEXPEN) 100 UNIT/ML solution pen-injector sc pen, Inject 14 Units under the skin into the appropriate area as directed 2 (Two) Times a Day With Meals. 16 units in the am, 14 units in the Pm, Disp: , Rfl:   •  insulin glargine (LANTUS, SEMGLEE) 100 UNIT/ML injection, Inject 54 Units under the skin into the appropriate area as directed Daily., Disp: , Rfl:   •  ketotifen  (ZADITOR) 0.025 % ophthalmic solution, 1 drop 2 (Two) Times a Day., Disp: , Rfl:   •  loratadine (CLARITIN) 10 MG tablet, Take 1 tablet by mouth Daily., Disp: , Rfl:   •  LORazepam (ATIVAN) 2 MG tablet, Take 1 tablet by mouth 2 (Two) Times a Day., Disp: , Rfl:   •  lovastatin (MEVACOR) 40 MG tablet, Take 1 tablet by mouth Every Night for 30 days., Disp: 30 tablet, Rfl: 2  •  montelukast (SINGULAIR) 10 MG tablet, Take 1 tablet by mouth Every Night., Disp: , Rfl:   •  mupirocin (BACTROBAN) 2 % nasal ointment, Apply to the inside of each nostril with a cotton swab two times daily, morning and evening, for 5 days before surgery., Disp: 10 each, Rfl: 0  •  nitroglycerin (NITROSTAT) 0.4 MG SL tablet, Place 1 tablet under the tongue Every 5 (Five) Minutes As Needed for Chest Pain. Take no more than 3 doses in 15 minutes., Disp: , Rfl:   •  omeprazole (priLOSEC) 40 MG capsule, TAKE 1 CAPSULE BY MOUTH EVERY DAY 30 MINUTES BEFORE MORNING MEAL, Disp: , Rfl:   •  salsalate (DISALCID) 750 MG tablet, Take 1 tablet by mouth 2 (Two) Times a Day., Disp: , Rfl:   •  tamsulosin (FLOMAX) 0.4 MG capsule 24 hr capsule, Take 1 capsule by mouth Daily., Disp: , Rfl:     Past Surgical History:   Procedure Laterality Date   • BLEPHAROPLASTY Bilateral    • CARDIAC CATHETERIZATION     • CARDIAC CATHETERIZATION      11/30/2022 per Dr. Bansal   • CARDIAC CATHETERIZATION Left 11/30/2022    Procedure: Left Heart Cath;  Surgeon: Moisés Bansal MD;  Location: [facility] INVASIVE LOCATION;  Service: Cardiovascular;  Laterality: Left;   • CORONARY STENT PLACEMENT     • SHOULDER ROTATOR CUFF REPAIR Right    • SINUPLASTY     • SKIN BIOPSY     • TRIGGER POINT INJECTION         Social History     Socioeconomic History   • Marital status:    Tobacco Use   • Smoking status: Never   • Smokeless tobacco: Current     Types: Chew   Vaping Use   • Vaping Use: Never used   Substance and Sexual Activity   • Alcohol use: Not Currently     Comment: Drank some  years ago   • Drug use: Never   • Sexual activity: Not Currently     Partners: Female         Review of Systems   Constitutional: Positive for fatigue. Negative for activity change, appetite change, chills, diaphoresis, fever and unexpected weight change.   HENT: Positive for hearing loss and postnasal drip. Negative for congestion, dental problem, drooling, ear discharge, ear pain, facial swelling, mouth sores, nosebleeds, rhinorrhea, sinus pressure, sneezing, sore throat, tinnitus, trouble swallowing and voice change.    Eyes: Positive for itching. Negative for photophobia, pain, discharge, redness and visual disturbance.   Respiratory: Positive for shortness of breath. Negative for apnea, cough, choking, chest tightness, wheezing and stridor.    Cardiovascular: Negative.  Negative for chest pain, palpitations and leg swelling.   Gastrointestinal: Negative.  Negative for abdominal distention, abdominal pain, anal bleeding, blood in stool, constipation, diarrhea, nausea, rectal pain and vomiting.   Endocrine: Negative.  Negative for cold intolerance, heat intolerance, polydipsia, polyphagia and polyuria.   Genitourinary: Positive for genital sores. Negative for decreased urine volume, difficulty urinating, dysuria, enuresis, flank pain, frequency, hematuria and urgency.   Musculoskeletal: Positive for back pain, gait problem, neck pain and neck stiffness. Negative for arthralgias, joint swelling and myalgias.   Skin: Negative.  Negative for color change, pallor, rash and wound.   Allergic/Immunologic: Positive for environmental allergies. Negative for food allergies and immunocompromised state.   Neurological: Positive for dizziness, tremors, weakness, light-headedness, numbness and headaches. Negative for seizures, syncope, facial asymmetry and speech difficulty.   Hematological: Negative.  Negative for adenopathy. Does not bruise/bleed easily.   Psychiatric/Behavioral: Positive for agitation, confusion and  "decreased concentration. Negative for behavioral problems, dysphoric mood, hallucinations, self-injury, sleep disturbance and suicidal ideas. The patient is nervous/anxious. The patient is not hyperactive.    All other systems reviewed and are negative.      Objective   Vital Signs: Temperature 97.7 °F (36.5 °C), temperature source Infrared, resp. rate 18, height 188 cm (74\"), weight 105 kg (230 lb 12.8 oz).  Physical Exam  Vitals and nursing note reviewed.   Constitutional:       General: He is not in acute distress.     Appearance: He is well-developed.   HENT:      Head: Normocephalic and atraumatic.   Pulmonary:      Breath sounds: Normal breath sounds.   Psychiatric:         Behavior: Behavior normal.         Thought Content: Thought content normal.     Musculoskeletal:     Strength is intact in upper and lower extremities to direct testing.     Station and gait are normal.  Neurologic:     Muscle tone is normal throughout.     Coordination is intact.     Deep tendon reflexes: 3+ bicep, 2+ tricep.  Difficult to elicit in the lower extremities     Sensation is intact to light touch throughout.     Patient is oriented to person, place, and time.     Yvonne sign negative.  No ankle clonus       Independent review of radiographic imaging: CT of the cervical spine dated 4/1/2022 demonstrates multilevel spondylosis.  There is extensive anterior and posterior osteophytes most pronounced at C5-6 where there is a large posterior calcification narrowing the central canal.    MRI of the cervical spine dated 5/4/2023 demonstrates disc osteophyte complex at C4-5 and C5 6.  There is moderate to severe canal stenosis at C5-6.  There is moderate canal narrowing at C4-5 with foraminal narrowing bilaterally.    Assessment & Plan   Diagnosis:   1.  Cervical spondylosis with radiculomyelopathy  2.  Chronic mechanical back pain  3.  Bilateral carpal tunnel syndrome    Medical Decision Making: Patient has a couple things going on " but the main concern is he has signs of an early myelopathy.  Dr. Olmos has recommended ACDF C4-5 and C5-6.  The goal of surgery is to prevent progression of his myelopathy.  I think this will help with some of his radicular symptoms as well.  Some of his numbness may be from carpal tunnel.  I discussed the general nature of the procedure as well as risk, limitations, complications, and alternatives to the procedure.  Patient will need formal cardiac clearance to come off of his Plavix.    Diagnoses and all orders for this visit:    1. Cervical spondylosis with myelopathy (Primary)      2. Cervical radiculopathy                 Juanita Rajan PA-C  Patient Care Team:  Alberto Green MD as PCP - General (Family Medicine)  Moisés Bansal MD as Consulting Physician (Cardiology)

## 2023-05-10 NOTE — TELEPHONE ENCOUNTER
Neurosurgery requesting cardiac clearance for an anterior cervical discectomy and fusion with Dr. Olmos, as well as permission to hold plavix 7 days prior to procedure.   Called patient and he denies any cardiac issues.   He did have a stent in 11/2022.    Please advice. Thank you!    1. Coronary artery disease  a. Abnormal stress test followed by left heart catheterization with stent placement to the LAD February 2019-data deficit ([facility])  b. CCS class I-II atypical chest discomfort/NYHA class I-II dyspnea on exertion symptoms  c. Cardiac PET 8/3/2020: Acceptable negative IV Lexiscan hybrid PET cardiac CT stress scan studies suggestive of low probability for significant focal obstructive CAD with preserved systolic LV function (LVEF 0.54)  d. Echo, 11/13/2022; EF 56-60%. Mild concentric LVH. Grade I diastolic dysfunction with high LAP. RA cavity is mildly dilated.  e. Lexiscan, 10/25/2022; EF 50%. Scintigraphy demonstrates a fixed inferior wall defect compatible with infarct or diaphragmatic attenuation. Additionally there is a moderately sized, moderately dense but incompletely reversible inferolateral wall defect compatible with ischemia. Elevated transient ischemic dilation ratio 1.26 possibly representing multivessel coronary disease. No evidence of increased lung uptake of radiopharmaceutical.  f. LHC, 11/30/2022; EF 65%. 75 to 80% stenosis of the ostial/proximal LAD with abnormal IFR.  This vessel is now status post successful stenting with 3.5 x 28 mm JOSE E reducing the stenosis to no significant residual disease and improvement of IFR. Patent stent of mid LAD with nonobstructive 20 to 30% plaque. 90% stenosis of the ostial/proximal ramus intermedius.  This vessel is now status post JOSE E with 2.25 x 28 mm JOSE E reducing the stenosis to 0%. No significant disease of the codominant left circumflex coronary artery and the codominant right coronary artery.   g. 30 day MCOT, 12/29/2022; SR. Rare APCs  and PVCs. No significant abnormalities.

## 2023-05-10 NOTE — TELEPHONE ENCOUNTER
Okay to proceed with procedure from a cardiac standpoint.  He must continue aspirin.  Can hold Plavix 7 days prior to procedure.

## 2023-05-10 NOTE — PROGRESS NOTES
Patient: Panda Henry  : 1950  Chart #: [id number]    Date of Service: 5/10/2023    CHIEF COMPLAINT: Neck and arm pain with sensory alteration     History of Present Illness Mr. Henry is a 72-year-old retired  with a past medical history significant for CAD (s/p stenting- most recent was with Dr Bansal in ) on plavix, HTN, HLD, and chronic back pain.  His primary concern today is pain in the dorsal neck that extends into the shoulders bilaterally and all the way to the elbow.  He has pain down his spine as well. He has numbness in his hands.  Both thumbs are numb and he has numbness in the right long and ring finger. Sometimes his whole hand will go numb. He has been told he has carpal tunnel syndrome.  He also complains of some unsteady gait.  Lately he has been feeling very off balance and has had a few falls.  He feels like he does not have control of his legs.  Symptoms have been progressing over the past 5 to 6 years.  He has been treated with formal physical therapy.  He has also undergone epidural injections.  Nothing is providing lasting relief.  He was told he needed cervical surgery and is here for second opinion.      Past Medical History:   Diagnosis Date   • Arthritis    • Asthma    • Cancer     basal cell carcinoma   • Cervical disc disorder    • COPD (chronic obstructive pulmonary disease)    • Coronary artery disease    • Depression    • Diabetes mellitus    • GERD (gastroesophageal reflux disease)    • Headache    • Hyperlipidemia    • Hypertension    • Injury of back    • Low back pain    • Lumbosacral disc disease    • Neuropathy    • Peripheral neuropathy    • Pulmonary arterial hypertension    • Spinal stenosis    • Thoracic disc disorder          Current Outpatient Medications:   •  albuterol sulfate  (90 Base) MCG/ACT inhaler, Inhale 2 puffs Every 4 (Four) Hours As Needed for Wheezing., Disp: , Rfl:   •  aspirin 81 MG EC tablet, Take 1  tablet by mouth Daily., Disp: , Rfl:   •  budesonide-formoterol (SYMBICORT) 160-4.5 MCG/ACT inhaler, INHALE 2 PUFFS TWICE A DAY FOR 30 DAYS, Disp: 10.2 each, Rfl: 2  •  carboxymethylcellulose (REFRESH PLUS) 0.5 % solution, 3 (Three) Times a Day As Needed for Dry Eyes., Disp: , Rfl:   •  carvedilol (COREG) 25 MG tablet, Take 1 tablet by mouth 2 (Two) Times a Day With Meals., Disp: 180 tablet, Rfl: 3  •  chlorhexidine (HIBICLENS) 4 % external liquid, Shower each day with solution for 5 days beginning 5 days before surgery., Disp: 120 mL, Rfl: 0  •  chlorthalidone (HYGROTON) 25 MG tablet, Take 12.5 mg by mouth Daily. 1/2 tablet in morning, Disp: , Rfl:   •  clopidogrel (PLAVIX) 75 MG tablet, Take 1 tablet by mouth Daily., Disp: 90 tablet, Rfl: 3  •  diclofenac (VOLTAREN) 75 MG EC tablet, TAKE 1 TABLET BY MOUTH 2 TIMES A DAY FOR 30 DAYS., Disp: 60 tablet, Rfl: 0  •  doxepin (SINEquan) 100 MG capsule, Take 1 capsule by mouth every night at bedtime., Disp: , Rfl:   •  Entresto 24-26 MG tablet, TAKE 1 TABLET BY MOUTH TWICE A DAY, Disp: 60 tablet, Rfl: 5  •  escitalopram (LEXAPRO) 20 MG tablet, TAKE 2 & 1/2 TABLETS BY MOUTH DAILY 90 DAYS, Disp: 225 tablet, Rfl: 0  •  ezetimibe (ZETIA) 10 MG tablet, Take 1 tablet by mouth Daily., Disp: , Rfl:   •  finasteride (PROSCAR) 5 MG tablet, Take 1 tablet by mouth Daily., Disp: , Rfl:   •  gabapentin (NEURONTIN) 400 MG capsule, Take 1 capsule by mouth 3 (Three) Times a Day., Disp: , Rfl:   •  glucose 4-6 GM-MG per chewable tablet, Chew 2 tablets As Needed for Low Blood Sugar., Disp: , Rfl:   •  insulin aspart (novoLOG FLEXPEN) 100 UNIT/ML solution pen-injector sc pen, Inject 14 Units under the skin into the appropriate area as directed 2 (Two) Times a Day With Meals. 16 units in the am, 14 units in the Pm, Disp: , Rfl:   •  insulin glargine (LANTUS, SEMGLEE) 100 UNIT/ML injection, Inject 54 Units under the skin into the appropriate area as directed Daily., Disp: , Rfl:   •  ketotifen  (ZADITOR) 0.025 % ophthalmic solution, 1 drop 2 (Two) Times a Day., Disp: , Rfl:   •  loratadine (CLARITIN) 10 MG tablet, Take 1 tablet by mouth Daily., Disp: , Rfl:   •  LORazepam (ATIVAN) 2 MG tablet, Take 1 tablet by mouth 2 (Two) Times a Day., Disp: , Rfl:   •  lovastatin (MEVACOR) 40 MG tablet, Take 1 tablet by mouth Every Night for 30 days., Disp: 30 tablet, Rfl: 2  •  montelukast (SINGULAIR) 10 MG tablet, Take 1 tablet by mouth Every Night., Disp: , Rfl:   •  mupirocin (BACTROBAN) 2 % nasal ointment, Apply to the inside of each nostril with a cotton swab two times daily, morning and evening, for 5 days before surgery., Disp: 10 each, Rfl: 0  •  nitroglycerin (NITROSTAT) 0.4 MG SL tablet, Place 1 tablet under the tongue Every 5 (Five) Minutes As Needed for Chest Pain. Take no more than 3 doses in 15 minutes., Disp: , Rfl:   •  omeprazole (priLOSEC) 40 MG capsule, TAKE 1 CAPSULE BY MOUTH EVERY DAY 30 MINUTES BEFORE MORNING MEAL, Disp: , Rfl:   •  salsalate (DISALCID) 750 MG tablet, Take 1 tablet by mouth 2 (Two) Times a Day., Disp: , Rfl:   •  tamsulosin (FLOMAX) 0.4 MG capsule 24 hr capsule, Take 1 capsule by mouth Daily., Disp: , Rfl:     Past Surgical History:   Procedure Laterality Date   • BLEPHAROPLASTY Bilateral    • CARDIAC CATHETERIZATION     • CARDIAC CATHETERIZATION      11/30/2022 per Dr. Bansal   • CARDIAC CATHETERIZATION Left 11/30/2022    Procedure: Left Heart Cath;  Surgeon: Moisés Bansal MD;  Location: [facility] INVASIVE LOCATION;  Service: Cardiovascular;  Laterality: Left;   • CORONARY STENT PLACEMENT     • SHOULDER ROTATOR CUFF REPAIR Right    • SINUPLASTY     • SKIN BIOPSY     • TRIGGER POINT INJECTION         Social History     Socioeconomic History   • Marital status:    Tobacco Use   • Smoking status: Never   • Smokeless tobacco: Current     Types: Chew   Vaping Use   • Vaping Use: Never used   Substance and Sexual Activity   • Alcohol use: Not Currently     Comment: Drank some  years ago   • Drug use: Never   • Sexual activity: Not Currently     Partners: Female         Review of Systems   Constitutional: Positive for fatigue. Negative for activity change, appetite change, chills, diaphoresis, fever and unexpected weight change.   HENT: Positive for hearing loss and postnasal drip. Negative for congestion, dental problem, drooling, ear discharge, ear pain, facial swelling, mouth sores, nosebleeds, rhinorrhea, sinus pressure, sneezing, sore throat, tinnitus, trouble swallowing and voice change.    Eyes: Positive for itching. Negative for photophobia, pain, discharge, redness and visual disturbance.   Respiratory: Positive for shortness of breath. Negative for apnea, cough, choking, chest tightness, wheezing and stridor.    Cardiovascular: Negative.  Negative for chest pain, palpitations and leg swelling.   Gastrointestinal: Negative.  Negative for abdominal distention, abdominal pain, anal bleeding, blood in stool, constipation, diarrhea, nausea, rectal pain and vomiting.   Endocrine: Negative.  Negative for cold intolerance, heat intolerance, polydipsia, polyphagia and polyuria.   Genitourinary: Positive for genital sores. Negative for decreased urine volume, difficulty urinating, dysuria, enuresis, flank pain, frequency, hematuria and urgency.   Musculoskeletal: Positive for back pain, gait problem, neck pain and neck stiffness. Negative for arthralgias, joint swelling and myalgias.   Skin: Negative.  Negative for color change, pallor, rash and wound.   Allergic/Immunologic: Positive for environmental allergies. Negative for food allergies and immunocompromised state.   Neurological: Positive for dizziness, tremors, weakness, light-headedness, numbness and headaches. Negative for seizures, syncope, facial asymmetry and speech difficulty.   Hematological: Negative.  Negative for adenopathy. Does not bruise/bleed easily.   Psychiatric/Behavioral: Positive for agitation, confusion and  "decreased concentration. Negative for behavioral problems, dysphoric mood, hallucinations, self-injury, sleep disturbance and suicidal ideas. The patient is nervous/anxious. The patient is not hyperactive.    All other systems reviewed and are negative.      Objective   Vital Signs: Temperature 97.7 °F (36.5 °C), temperature source Infrared, resp. rate 18, height 188 cm (74\"), weight 105 kg (230 lb 12.8 oz).  Physical Exam  Vitals and nursing note reviewed.   Constitutional:       General: He is not in acute distress.     Appearance: He is well-developed.   HENT:      Head: Normocephalic and atraumatic.   Pulmonary:      Breath sounds: Normal breath sounds.   Psychiatric:         Behavior: Behavior normal.         Thought Content: Thought content normal.     Musculoskeletal:     Strength is intact in upper and lower extremities to direct testing.     Station and gait are normal.  Neurologic:     Muscle tone is normal throughout.     Coordination is intact.     Deep tendon reflexes: 3+ bicep, 2+ tricep.  Difficult to elicit in the lower extremities     Sensation is intact to light touch throughout.     Patient is oriented to person, place, and time.     Yvonne sign negative.  No ankle clonus       Independent review of radiographic imaging: CT of the cervical spine dated 4/1/2022 demonstrates multilevel spondylosis.  There is extensive anterior and posterior osteophytes most pronounced at C5-6 where there is a large posterior calcification narrowing the central canal.    MRI of the cervical spine dated 5/4/2023 demonstrates disc osteophyte complex at C4-5 and C5 6.  There is moderate to severe canal stenosis at C5-6.  There is moderate canal narrowing at C4-5 with foraminal narrowing bilaterally.    Assessment & Plan   Diagnosis:   1.  Cervical spondylosis with radiculomyelopathy  2.  Chronic mechanical back pain  3.  Bilateral carpal tunnel syndrome    Medical Decision Making: Patient has a couple things going on " but the main concern is he has signs of an early myelopathy.  Dr. Olmos has recommended ACDF C4-5 and C5-6.  The goal of surgery is to prevent progression of his myelopathy.  I think this will help with some of his radicular symptoms as well.  Some of his numbness may be from carpal tunnel.  I discussed the general nature of the procedure as well as risk, limitations, complications, and alternatives to the procedure.  Patient will need formal cardiac clearance to come off of his Plavix.    Diagnoses and all orders for this visit:    1. Cervical spondylosis with myelopathy (Primary)      2. Cervical radiculopathy                 Juanita Rajan PA-C  Patient Care Team:  Alberto Green MD as PCP - General (Family Medicine)  Moisés Bansal MD as Consulting Physician (Cardiology)

## 2023-05-11 PROBLEM — M54.12 CERVICAL RADICULOPATHY: Status: ACTIVE | Noted: 2023-05-11

## 2023-05-11 PROBLEM — M47.12 CERVICAL SPONDYLOSIS WITH MYELOPATHY: Status: ACTIVE | Noted: 2023-05-11

## 2023-05-24 ENCOUNTER — PRE-ADMISSION TESTING (OUTPATIENT)
Dept: PREADMISSION TESTING | Facility: HOSPITAL | Age: 73
End: 2023-05-24
Payer: MEDICARE

## 2023-05-24 VITALS — BODY MASS INDEX: 29.34 KG/M2 | HEIGHT: 74 IN | WEIGHT: 228.62 LBS

## 2023-05-24 DIAGNOSIS — M54.12 CERVICAL RADICULOPATHY: ICD-10-CM

## 2023-05-24 DIAGNOSIS — M47.12 CERVICAL SPONDYLOSIS WITH MYELOPATHY: ICD-10-CM

## 2023-05-24 LAB
INR PPP: 0.95 (ref 0.89–1.12)
PROTHROMBIN TIME: 12.8 SECONDS (ref 12.2–14.5)
QT INTERVAL: 412 MS
QTC INTERVAL: 421 MS

## 2023-05-24 PROCEDURE — 93005 ELECTROCARDIOGRAM TRACING: CPT

## 2023-05-24 PROCEDURE — 85025 COMPLETE CBC W/AUTO DIFF WBC: CPT | Performed by: PHYSICIAN ASSISTANT

## 2023-05-24 PROCEDURE — 80053 COMPREHEN METABOLIC PANEL: CPT | Performed by: PHYSICIAN ASSISTANT

## 2023-05-24 PROCEDURE — 87081 CULTURE SCREEN ONLY: CPT

## 2023-05-24 PROCEDURE — 85610 PROTHROMBIN TIME: CPT

## 2023-05-24 PROCEDURE — 36415 COLL VENOUS BLD VENIPUNCTURE: CPT

## 2023-05-24 NOTE — PAT
An arrival time for procedure was not provided during PAT visit. If patient had any questions or concerns about their arrival time, they were instructed to contact their surgeon/physician.  Additionally, if the patient referred to an arrival time that was acquired from their my chart account, patient was encouraged to verify that time with their surgeon/physician. Arrival times are NOT provided in Pre Admission Testing Department.    Patient to apply Chlorhexadine wipes  to surgical area (as instructed) the night before procedure and the AM of procedure. Wipes provided.    Prescription for Chlorhexidine shower called into patient's pharmacy or [facility] pharmacy by patient's surgeon.  Reinforced with patient to  the prescription from applicable pharmacy if they haven't already.  Verbal and written instructions given regarding proper use of Chlorhexidine body wash to patient and/or famlily during PAT visit. Patient/family also instructed to complete checklist and return it to Pre-op on the day of surgery.  Patient and/or family verbalized understanding.    Patient instructed to drink 20 ounces of Gatorade and it needs to be completed 1 hour (for Main OR patients) or 2 hours (scheduled  section & [facility] patients) before given arrival time for procedure (NO RED Gatorade)    Patient verbalized understanding.    Per Anesthesia Request, patient instructed not to take their ACE/ARB medications on the AM of surgery.    Patient viewed general PAT education video as instructed in their preoperative information received from their surgeon.  Patient stated the general PAT education video was viewed in its entirety and survey completed.  Copies of PAT general education handouts (Incentive Spirometry, Meds to Beds Program, Patient Belongings, Pre-op skin preparation instructions, Blood Glucose testing, Visitor policy, Surgery FAQ, Code H) distributed to patient if not printed. Education related to the PAT pass and  skin preparation for surgery (if applicable) completed in PAT as a reinforcement to PAT education video. Patient instructed to return PAT pass provided today as well as completed skin preparation sheet (if applicable) on the day of procedure.     Additionally if patient had not viewed video yet but intended to view it at home or in our waiting area, then referred them to the handout with QR code/link provided during PAT visit.  Instructed patient to complete survey after viewing the video in its entirety.  Encouraged patient/family to read PAT general education handouts thoroughly and notify PAT staff with any questions or concerns. Patient verbalized understanding of all information and priority content.    PATIENT EKG ON CHART AND IN Epic FROM 05/24/2023    PATIENT CARDIAC CLEARANCE ON CHART AND IN Epic

## 2023-05-25 DIAGNOSIS — M47.12 CERVICAL SPONDYLOSIS WITH MYELOPATHY: Primary | ICD-10-CM

## 2023-05-25 LAB — MRSA SPEC QL CULT: NORMAL

## 2023-05-30 LAB
QT INTERVAL: 412 MS
QTC INTERVAL: 421 MS

## 2023-05-31 ENCOUNTER — ANESTHESIA EVENT (OUTPATIENT)
Dept: PERIOP | Facility: HOSPITAL | Age: 73
DRG: 908 | End: 2023-05-31
Payer: MEDICARE

## 2023-05-31 RX ORDER — CARVEDILOL 12.5 MG/1
TABLET ORAL
Qty: 180 TABLET | Refills: 1 | OUTPATIENT
Start: 2023-05-31

## 2023-05-31 RX ORDER — SODIUM CHLORIDE 9 MG/ML
40 INJECTION, SOLUTION INTRAVENOUS AS NEEDED
Status: CANCELLED | OUTPATIENT
Start: 2023-05-31

## 2023-05-31 RX ORDER — SODIUM CHLORIDE, SODIUM LACTATE, POTASSIUM CHLORIDE, CALCIUM CHLORIDE 600; 310; 30; 20 MG/100ML; MG/100ML; MG/100ML; MG/100ML
9 INJECTION, SOLUTION INTRAVENOUS CONTINUOUS
Status: CANCELLED | OUTPATIENT
Start: 2023-05-31

## 2023-05-31 RX ORDER — SODIUM CHLORIDE 0.9 % (FLUSH) 0.9 %
10 SYRINGE (ML) INJECTION AS NEEDED
Status: CANCELLED | OUTPATIENT
Start: 2023-05-31

## 2023-05-31 RX ORDER — SODIUM CHLORIDE 0.9 % (FLUSH) 0.9 %
10 SYRINGE (ML) INJECTION EVERY 12 HOURS SCHEDULED
Status: CANCELLED | OUTPATIENT
Start: 2023-05-31

## 2023-06-01 ENCOUNTER — ANESTHESIA (OUTPATIENT)
Dept: PERIOP | Facility: HOSPITAL | Age: 73
DRG: 908 | End: 2023-06-01
Payer: MEDICARE

## 2023-06-01 ENCOUNTER — HOSPITAL ENCOUNTER (INPATIENT)
Facility: HOSPITAL | Age: 73
LOS: 1 days | Discharge: HOME | DRG: 908 | End: 2023-06-02
Attending: NEUROLOGICAL SURGERY | Admitting: NEUROLOGICAL SURGERY
Payer: MEDICARE

## 2023-06-01 ENCOUNTER — APPOINTMENT (OUTPATIENT)
Dept: GENERAL RADIOLOGY | Facility: HOSPITAL | Age: 73
DRG: 908 | End: 2023-06-01
Payer: MEDICARE

## 2023-06-01 DIAGNOSIS — M54.12 CERVICAL RADICULOPATHY: ICD-10-CM

## 2023-06-01 DIAGNOSIS — M47.12 CERVICAL SPONDYLOSIS WITH MYELOPATHY: ICD-10-CM

## 2023-06-01 DIAGNOSIS — I25.10 CORONARY ARTERY DISEASE INVOLVING NATIVE CORONARY ARTERY OF NATIVE HEART WITHOUT ANGINA PECTORIS: ICD-10-CM

## 2023-06-01 LAB
GLUCOSE BLDC GLUCOMTR-MCNC: 111 MG/DL (ref 70–130)
GLUCOSE BLDC GLUCOMTR-MCNC: 137 MG/DL (ref 70–130)
GLUCOSE BLDC GLUCOMTR-MCNC: 193 MG/DL (ref 70–130)
GLUCOSE BLDC GLUCOMTR-MCNC: 247 MG/DL (ref 70–130)

## 2023-06-01 PROCEDURE — 63710000001 INSULIN LISPRO (HUMAN) PER 5 UNITS: Performed by: NEUROLOGICAL SURGERY

## 2023-06-01 PROCEDURE — 25010000002 FENTANYL CITRATE (PF) 100 MCG/2ML SOLUTION: Performed by: NURSE ANESTHETIST, CERTIFIED REGISTERED

## 2023-06-01 PROCEDURE — C1713 ANCHOR/SCREW BN/BN,TIS/BN: HCPCS | Performed by: NEUROLOGICAL SURGERY

## 2023-06-01 PROCEDURE — C1889 IMPLANT/INSERT DEVICE, NOC: HCPCS | Performed by: NEUROLOGICAL SURGERY

## 2023-06-01 PROCEDURE — 25010000002 ONDANSETRON PER 1 MG: Performed by: NURSE ANESTHETIST, CERTIFIED REGISTERED

## 2023-06-01 PROCEDURE — 22552 ARTHRD ANT NTRBD CERVICAL EA: CPT | Performed by: NEUROLOGICAL SURGERY

## 2023-06-01 PROCEDURE — 22845 INSERT SPINE FIXATION DEVICE: CPT | Performed by: PHYSICIAN ASSISTANT

## 2023-06-01 PROCEDURE — 22551 ARTHRD ANT NTRBDY CERVICAL: CPT | Performed by: PHYSICIAN ASSISTANT

## 2023-06-01 PROCEDURE — 25010000002 PROPOFOL 10 MG/ML EMULSION: Performed by: NURSE ANESTHETIST, CERTIFIED REGISTERED

## 2023-06-01 PROCEDURE — 25010000002 DEXAMETHASONE PER 1 MG: Performed by: NURSE ANESTHETIST, CERTIFIED REGISTERED

## 2023-06-01 PROCEDURE — 22845 INSERT SPINE FIXATION DEVICE: CPT | Performed by: NEUROLOGICAL SURGERY

## 2023-06-01 PROCEDURE — 22552 ARTHRD ANT NTRBD CERVICAL EA: CPT | Performed by: PHYSICIAN ASSISTANT

## 2023-06-01 PROCEDURE — 00QT0ZZ REPAIR SPINAL MENINGES, OPEN APPROACH: ICD-10-PCS | Performed by: NEUROLOGICAL SURGERY

## 2023-06-01 PROCEDURE — 25010000002 CEFAZOLIN IN DEXTROSE 2-4 GM/100ML-% SOLUTION: Performed by: NEUROLOGICAL SURGERY

## 2023-06-01 PROCEDURE — 0RB30ZZ EXCISION OF CERVICAL VERTEBRAL DISC, OPEN APPROACH: ICD-10-PCS | Performed by: NEUROLOGICAL SURGERY

## 2023-06-01 PROCEDURE — 82948 REAGENT STRIP/BLOOD GLUCOSE: CPT

## 2023-06-01 PROCEDURE — 25010000002 HYDROMORPHONE 1 MG/ML SOLUTION

## 2023-06-01 PROCEDURE — 25010000002 CEFAZOLIN IN DEXTROSE 2-4 GM/100ML-% SOLUTION: Performed by: PHYSICIAN ASSISTANT

## 2023-06-01 PROCEDURE — 25010000002 FENTANYL CITRATE (PF) 50 MCG/ML SOLUTION

## 2023-06-01 PROCEDURE — 76000 FLUOROSCOPY <1 HR PHYS/QHP: CPT

## 2023-06-01 PROCEDURE — 0RG20K0 FUSION OF 2 OR MORE CERVICAL VERTEBRAL JOINTS WITH NONAUTOLOGOUS TISSUE SUBSTITUTE, ANTERIOR APPROACH, ANTERIOR COLUMN, OPEN APPROACH: ICD-10-PCS | Performed by: NEUROLOGICAL SURGERY

## 2023-06-01 PROCEDURE — 25010000002 SUGAMMADEX 200 MG/2ML SOLUTION: Performed by: NURSE ANESTHETIST, CERTIFIED REGISTERED

## 2023-06-01 PROCEDURE — 20931 SP BONE ALGRFT STRUCT ADD-ON: CPT | Performed by: NEUROLOGICAL SURGERY

## 2023-06-01 PROCEDURE — 22551 ARTHRD ANT NTRBDY CERVICAL: CPT | Performed by: NEUROLOGICAL SURGERY

## 2023-06-01 DEVICE — PLATE 3002037 ZEVO 37MM 2 LVL
Type: IMPLANTABLE DEVICE | Site: SPINE CERVICAL | Status: FUNCTIONAL
Brand: ZEVO™ ANTERIOR CERVICAL PLATE SYSTEM

## 2023-06-01 DEVICE — HEMOST ABS SURGIFOAM SZ100 8X12 10MM: Type: IMPLANTABLE DEVICE | Site: SPINE CERVICAL | Status: FUNCTIONAL

## 2023-06-01 DEVICE — FLOSEAL HEMOSTATIC MATRIX, 10ML
Type: IMPLANTABLE DEVICE | Site: SPINE CERVICAL | Status: FUNCTIONAL
Brand: FLOSEAL HEMOSTATIC MATRIX

## 2023-06-01 DEVICE — CLIP LIGAT VASC HORIZON TI MD BLU 6CT: Type: IMPLANTABLE DEVICE | Site: SPINE CERVICAL | Status: FUNCTIONAL

## 2023-06-01 DEVICE — DURAGEN® PLUS DURAL REGENERATION MATRIX, 2 IN X 2 IN (5 CM X 5 CM)
Type: IMPLANTABLE DEVICE | Site: SPINE CERVICAL | Status: FUNCTIONAL
Brand: DURAGEN® PLUS

## 2023-06-01 DEVICE — CLIP LIGAT VASC HORIZON TI SM YEL 6CT: Type: IMPLANTABLE DEVICE | Site: SPINE CERVICAL | Status: FUNCTIONAL

## 2023-06-01 DEVICE — BONE LORDOTIC ASR 5X14X11 FZD: Type: IMPLANTABLE DEVICE | Site: SPINE CERVICAL | Status: FUNCTIONAL

## 2023-06-01 DEVICE — BONE LORDOTIC ASR 6X14X11 FZD: Type: IMPLANTABLE DEVICE | Site: SPINE CERVICAL | Status: FUNCTIONAL

## 2023-06-01 RX ORDER — ROCURONIUM BROMIDE 10 MG/ML
INJECTION, SOLUTION INTRAVENOUS AS NEEDED
Status: DISCONTINUED | OUTPATIENT
Start: 2023-06-01 | End: 2023-06-01 | Stop reason: SURG

## 2023-06-01 RX ORDER — SODIUM CHLORIDE, SODIUM LACTATE, POTASSIUM CHLORIDE, CALCIUM CHLORIDE 600; 310; 30; 20 MG/100ML; MG/100ML; MG/100ML; MG/100ML
75 INJECTION, SOLUTION INTRAVENOUS CONTINUOUS
Status: DISCONTINUED | OUTPATIENT
Start: 2023-06-01 | End: 2023-06-02

## 2023-06-01 RX ORDER — PROMETHAZINE HYDROCHLORIDE 25 MG/1
25 TABLET ORAL ONCE AS NEEDED
Status: DISCONTINUED | OUTPATIENT
Start: 2023-06-01 | End: 2023-06-01 | Stop reason: HOSPADM

## 2023-06-01 RX ORDER — ALBUTEROL SULFATE 90 UG/1
2 AEROSOL, METERED RESPIRATORY (INHALATION) EVERY 4 HOURS PRN
Status: DISCONTINUED | OUTPATIENT
Start: 2023-06-01 | End: 2023-06-02 | Stop reason: HOSPADM

## 2023-06-01 RX ORDER — OXYCODONE AND ACETAMINOPHEN 7.5; 325 MG/1; MG/1
TABLET ORAL
Status: COMPLETED
Start: 2023-06-01 | End: 2023-06-01

## 2023-06-01 RX ORDER — LIDOCAINE HYDROCHLORIDE 10 MG/ML
INJECTION, SOLUTION EPIDURAL; INFILTRATION; INTRACAUDAL; PERINEURAL AS NEEDED
Status: DISCONTINUED | OUTPATIENT
Start: 2023-06-01 | End: 2023-06-01 | Stop reason: SURG

## 2023-06-01 RX ORDER — SODIUM CHLORIDE 0.9 % (FLUSH) 0.9 %
3 SYRINGE (ML) INJECTION EVERY 12 HOURS SCHEDULED
Status: DISCONTINUED | OUTPATIENT
Start: 2023-06-01 | End: 2023-06-02 | Stop reason: HOSPADM

## 2023-06-01 RX ORDER — FINASTERIDE 5 MG/1
5 TABLET, FILM COATED ORAL DAILY
Status: DISCONTINUED | OUTPATIENT
Start: 2023-06-01 | End: 2023-06-02 | Stop reason: HOSPADM

## 2023-06-01 RX ORDER — CEFAZOLIN SODIUM 2 G/100ML
2 INJECTION, SOLUTION INTRAVENOUS EVERY 8 HOURS
Status: COMPLETED | OUTPATIENT
Start: 2023-06-01 | End: 2023-06-02

## 2023-06-01 RX ORDER — OXYCODONE AND ACETAMINOPHEN 7.5; 325 MG/1; MG/1
1 TABLET ORAL EVERY 4 HOURS PRN
Status: DISCONTINUED | OUTPATIENT
Start: 2023-06-01 | End: 2023-06-02 | Stop reason: HOSPADM

## 2023-06-01 RX ORDER — EPHEDRINE SULFATE 50 MG/ML
INJECTION INTRAVENOUS AS NEEDED
Status: DISCONTINUED | OUTPATIENT
Start: 2023-06-01 | End: 2023-06-01 | Stop reason: SURG

## 2023-06-01 RX ORDER — MORPHINE SULFATE 2 MG/ML
2 INJECTION, SOLUTION INTRAMUSCULAR; INTRAVENOUS EVERY 4 HOURS PRN
Status: DISCONTINUED | OUTPATIENT
Start: 2023-06-01 | End: 2023-06-02 | Stop reason: HOSPADM

## 2023-06-01 RX ORDER — ONDANSETRON 2 MG/ML
4 INJECTION INTRAMUSCULAR; INTRAVENOUS EVERY 6 HOURS PRN
Status: DISCONTINUED | OUTPATIENT
Start: 2023-06-01 | End: 2023-06-02 | Stop reason: HOSPADM

## 2023-06-01 RX ORDER — GABAPENTIN 400 MG/1
400 CAPSULE ORAL 3 TIMES DAILY
Status: DISCONTINUED | OUTPATIENT
Start: 2023-06-01 | End: 2023-06-02 | Stop reason: HOSPADM

## 2023-06-01 RX ORDER — FENTANYL CITRATE 50 UG/ML
INJECTION, SOLUTION INTRAMUSCULAR; INTRAVENOUS
Status: COMPLETED
Start: 2023-06-01 | End: 2023-06-01

## 2023-06-01 RX ORDER — CETIRIZINE HYDROCHLORIDE 10 MG/1
5 TABLET ORAL DAILY
Status: DISCONTINUED | OUTPATIENT
Start: 2023-06-01 | End: 2023-06-02 | Stop reason: HOSPADM

## 2023-06-01 RX ORDER — DROPERIDOL 2.5 MG/ML
0.62 INJECTION, SOLUTION INTRAMUSCULAR; INTRAVENOUS
Status: DISCONTINUED | OUTPATIENT
Start: 2023-06-01 | End: 2023-06-01 | Stop reason: HOSPADM

## 2023-06-01 RX ORDER — HYDROCODONE BITARTRATE AND ACETAMINOPHEN 5; 325 MG/1; MG/1
1 TABLET ORAL ONCE AS NEEDED
Status: DISCONTINUED | OUTPATIENT
Start: 2023-06-01 | End: 2023-06-01 | Stop reason: HOSPADM

## 2023-06-01 RX ORDER — PROMETHAZINE HYDROCHLORIDE 25 MG/1
25 SUPPOSITORY RECTAL ONCE AS NEEDED
Status: DISCONTINUED | OUTPATIENT
Start: 2023-06-01 | End: 2023-06-01 | Stop reason: HOSPADM

## 2023-06-01 RX ORDER — INSULIN LISPRO 100 [IU]/ML
3-14 INJECTION, SOLUTION INTRAVENOUS; SUBCUTANEOUS
Status: DISCONTINUED | OUTPATIENT
Start: 2023-06-01 | End: 2023-06-02 | Stop reason: HOSPADM

## 2023-06-01 RX ORDER — CARVEDILOL 12.5 MG/1
25 TABLET ORAL 2 TIMES DAILY WITH MEALS
Status: DISCONTINUED | OUTPATIENT
Start: 2023-06-01 | End: 2023-06-02 | Stop reason: HOSPADM

## 2023-06-01 RX ORDER — NALOXONE HCL 0.4 MG/ML
0.4 VIAL (ML) INJECTION AS NEEDED
Status: DISCONTINUED | OUTPATIENT
Start: 2023-06-01 | End: 2023-06-01 | Stop reason: HOSPADM

## 2023-06-01 RX ORDER — LORAZEPAM 1 MG/1
2 TABLET ORAL 2 TIMES DAILY
Status: DISCONTINUED | OUTPATIENT
Start: 2023-06-01 | End: 2023-06-02 | Stop reason: HOSPADM

## 2023-06-01 RX ORDER — ATORVASTATIN CALCIUM 10 MG/1
10 TABLET, FILM COATED ORAL NIGHTLY
Status: DISCONTINUED | OUTPATIENT
Start: 2023-06-01 | End: 2023-06-02 | Stop reason: HOSPADM

## 2023-06-01 RX ORDER — DROPERIDOL 2.5 MG/ML
0.62 INJECTION, SOLUTION INTRAMUSCULAR; INTRAVENOUS ONCE AS NEEDED
Status: DISCONTINUED | OUTPATIENT
Start: 2023-06-01 | End: 2023-06-01 | Stop reason: HOSPADM

## 2023-06-01 RX ORDER — SODIUM CHLORIDE 0.9 % (FLUSH) 0.9 %
10 SYRINGE (ML) INJECTION AS NEEDED
Status: DISCONTINUED | OUTPATIENT
Start: 2023-06-01 | End: 2023-06-02 | Stop reason: HOSPADM

## 2023-06-01 RX ORDER — MAGNESIUM HYDROXIDE 1200 MG/15ML
LIQUID ORAL AS NEEDED
Status: DISCONTINUED | OUTPATIENT
Start: 2023-06-01 | End: 2023-06-01 | Stop reason: HOSPADM

## 2023-06-01 RX ORDER — NITROGLYCERIN 0.4 MG/1
0.4 TABLET SUBLINGUAL
Status: DISCONTINUED | OUTPATIENT
Start: 2023-06-01 | End: 2023-06-02 | Stop reason: HOSPADM

## 2023-06-01 RX ORDER — CEFAZOLIN SODIUM 2 G/100ML
2 INJECTION, SOLUTION INTRAVENOUS ONCE
Status: COMPLETED | OUTPATIENT
Start: 2023-06-01 | End: 2023-06-01

## 2023-06-01 RX ORDER — SODIUM CHLORIDE 9 MG/ML
40 INJECTION, SOLUTION INTRAVENOUS AS NEEDED
Status: DISCONTINUED | OUTPATIENT
Start: 2023-06-01 | End: 2023-06-01 | Stop reason: HOSPADM

## 2023-06-01 RX ORDER — DEXTROSE MONOHYDRATE 25 G/50ML
25 INJECTION, SOLUTION INTRAVENOUS
Status: DISCONTINUED | OUTPATIENT
Start: 2023-06-01 | End: 2023-06-02 | Stop reason: HOSPADM

## 2023-06-01 RX ORDER — TAMSULOSIN HYDROCHLORIDE 0.4 MG/1
0.4 CAPSULE ORAL NIGHTLY
Status: DISCONTINUED | OUTPATIENT
Start: 2023-06-01 | End: 2023-06-02 | Stop reason: HOSPADM

## 2023-06-01 RX ORDER — SODIUM CHLORIDE 0.9 % (FLUSH) 0.9 %
3-10 SYRINGE (ML) INJECTION AS NEEDED
Status: DISCONTINUED | OUTPATIENT
Start: 2023-06-01 | End: 2023-06-01 | Stop reason: HOSPADM

## 2023-06-01 RX ORDER — MEPERIDINE HYDROCHLORIDE 25 MG/ML
12.5 INJECTION INTRAMUSCULAR; INTRAVENOUS; SUBCUTANEOUS
Status: DISCONTINUED | OUTPATIENT
Start: 2023-06-01 | End: 2023-06-01 | Stop reason: HOSPADM

## 2023-06-01 RX ORDER — FENTANYL CITRATE 50 UG/ML
50 INJECTION, SOLUTION INTRAMUSCULAR; INTRAVENOUS
Status: DISCONTINUED | OUTPATIENT
Start: 2023-06-01 | End: 2023-06-01 | Stop reason: HOSPADM

## 2023-06-01 RX ORDER — DIPHENHYDRAMINE HCL 25 MG
25 CAPSULE ORAL NIGHTLY PRN
Status: DISCONTINUED | OUTPATIENT
Start: 2023-06-01 | End: 2023-06-02 | Stop reason: HOSPADM

## 2023-06-01 RX ORDER — ESCITALOPRAM OXALATE 20 MG/1
20 TABLET ORAL NIGHTLY
Status: DISCONTINUED | OUTPATIENT
Start: 2023-06-01 | End: 2023-06-02 | Stop reason: HOSPADM

## 2023-06-01 RX ORDER — FAMOTIDINE 20 MG/1
20 TABLET, FILM COATED ORAL
Status: COMPLETED | OUTPATIENT
Start: 2023-06-01 | End: 2023-06-01

## 2023-06-01 RX ORDER — NICOTINE POLACRILEX 4 MG
15 LOZENGE BUCCAL
Status: DISCONTINUED | OUTPATIENT
Start: 2023-06-01 | End: 2023-06-02 | Stop reason: HOSPADM

## 2023-06-01 RX ORDER — DOXEPIN HYDROCHLORIDE 50 MG/1
100 CAPSULE ORAL NIGHTLY
Status: DISCONTINUED | OUTPATIENT
Start: 2023-06-01 | End: 2023-06-02 | Stop reason: HOSPADM

## 2023-06-01 RX ORDER — ONDANSETRON 2 MG/ML
4 INJECTION INTRAMUSCULAR; INTRAVENOUS ONCE AS NEEDED
Status: DISCONTINUED | OUTPATIENT
Start: 2023-06-01 | End: 2023-06-01 | Stop reason: HOSPADM

## 2023-06-01 RX ORDER — PANTOPRAZOLE SODIUM 40 MG/1
40 TABLET, DELAYED RELEASE ORAL
Status: DISCONTINUED | OUTPATIENT
Start: 2023-06-02 | End: 2023-06-02 | Stop reason: HOSPADM

## 2023-06-01 RX ORDER — MORPHINE SULFATE 4 MG/ML
4 INJECTION, SOLUTION INTRAMUSCULAR; INTRAVENOUS EVERY 4 HOURS PRN
Status: DISCONTINUED | OUTPATIENT
Start: 2023-06-01 | End: 2023-06-02 | Stop reason: HOSPADM

## 2023-06-01 RX ORDER — SODIUM CHLORIDE 9 MG/ML
100 INJECTION, SOLUTION INTRAVENOUS CONTINUOUS
Status: DISCONTINUED | OUTPATIENT
Start: 2023-06-01 | End: 2023-06-02

## 2023-06-01 RX ORDER — NALOXONE HCL 0.4 MG/ML
0.4 VIAL (ML) INJECTION
Status: DISCONTINUED | OUTPATIENT
Start: 2023-06-01 | End: 2023-06-02 | Stop reason: HOSPADM

## 2023-06-01 RX ORDER — MONTELUKAST SODIUM 10 MG/1
10 TABLET ORAL NIGHTLY
Status: DISCONTINUED | OUTPATIENT
Start: 2023-06-01 | End: 2023-06-02 | Stop reason: HOSPADM

## 2023-06-01 RX ORDER — ACETAMINOPHEN 325 MG/1
650 TABLET ORAL EVERY 4 HOURS PRN
Status: DISCONTINUED | OUTPATIENT
Start: 2023-06-01 | End: 2023-06-02 | Stop reason: HOSPADM

## 2023-06-01 RX ORDER — FENTANYL CITRATE 50 UG/ML
INJECTION, SOLUTION INTRAMUSCULAR; INTRAVENOUS AS NEEDED
Status: DISCONTINUED | OUTPATIENT
Start: 2023-06-01 | End: 2023-06-01 | Stop reason: SURG

## 2023-06-01 RX ORDER — IBUPROFEN 600 MG/1
600 TABLET ORAL EVERY 8 HOURS SCHEDULED
Status: DISCONTINUED | OUTPATIENT
Start: 2023-06-01 | End: 2023-06-02 | Stop reason: HOSPADM

## 2023-06-01 RX ORDER — SODIUM CHLORIDE 0.9 % (FLUSH) 0.9 %
3 SYRINGE (ML) INJECTION EVERY 12 HOURS SCHEDULED
Status: DISCONTINUED | OUTPATIENT
Start: 2023-06-01 | End: 2023-06-01 | Stop reason: HOSPADM

## 2023-06-01 RX ORDER — IPRATROPIUM BROMIDE AND ALBUTEROL SULFATE 2.5; .5 MG/3ML; MG/3ML
3 SOLUTION RESPIRATORY (INHALATION) ONCE AS NEEDED
Status: DISCONTINUED | OUTPATIENT
Start: 2023-06-01 | End: 2023-06-01 | Stop reason: HOSPADM

## 2023-06-01 RX ORDER — MIDAZOLAM HYDROCHLORIDE 1 MG/ML
0.5 INJECTION INTRAMUSCULAR; INTRAVENOUS
Status: DISCONTINUED | OUTPATIENT
Start: 2023-06-01 | End: 2023-06-01 | Stop reason: HOSPADM

## 2023-06-01 RX ORDER — PROPOFOL 10 MG/ML
VIAL (ML) INTRAVENOUS AS NEEDED
Status: DISCONTINUED | OUTPATIENT
Start: 2023-06-01 | End: 2023-06-01 | Stop reason: SURG

## 2023-06-01 RX ORDER — LABETALOL HYDROCHLORIDE 5 MG/ML
5 INJECTION, SOLUTION INTRAVENOUS
Status: DISCONTINUED | OUTPATIENT
Start: 2023-06-01 | End: 2023-06-01 | Stop reason: HOSPADM

## 2023-06-01 RX ORDER — BUDESONIDE AND FORMOTEROL FUMARATE DIHYDRATE 160; 4.5 UG/1; UG/1
2 AEROSOL RESPIRATORY (INHALATION)
Status: DISCONTINUED | OUTPATIENT
Start: 2023-06-01 | End: 2023-06-02 | Stop reason: HOSPADM

## 2023-06-01 RX ORDER — ONDANSETRON 2 MG/ML
INJECTION INTRAMUSCULAR; INTRAVENOUS AS NEEDED
Status: DISCONTINUED | OUTPATIENT
Start: 2023-06-01 | End: 2023-06-01 | Stop reason: SURG

## 2023-06-01 RX ORDER — DEXAMETHASONE SODIUM PHOSPHATE 4 MG/ML
INJECTION, SOLUTION INTRA-ARTICULAR; INTRALESIONAL; INTRAMUSCULAR; INTRAVENOUS; SOFT TISSUE AS NEEDED
Status: DISCONTINUED | OUTPATIENT
Start: 2023-06-01 | End: 2023-06-01 | Stop reason: SURG

## 2023-06-01 RX ORDER — LIDOCAINE HYDROCHLORIDE 10 MG/ML
0.5 INJECTION, SOLUTION EPIDURAL; INFILTRATION; INTRACAUDAL; PERINEURAL ONCE AS NEEDED
Status: COMPLETED | OUTPATIENT
Start: 2023-06-01 | End: 2023-06-01

## 2023-06-01 RX ORDER — SODIUM CHLORIDE 9 MG/ML
40 INJECTION, SOLUTION INTRAVENOUS AS NEEDED
Status: DISCONTINUED | OUTPATIENT
Start: 2023-06-01 | End: 2023-06-02 | Stop reason: HOSPADM

## 2023-06-01 RX ORDER — HYDRALAZINE HYDROCHLORIDE 20 MG/ML
5 INJECTION INTRAMUSCULAR; INTRAVENOUS
Status: DISCONTINUED | OUTPATIENT
Start: 2023-06-01 | End: 2023-06-01 | Stop reason: HOSPADM

## 2023-06-01 RX ADMIN — ROCURONIUM BROMIDE 25 MG: 10 SOLUTION INTRAVENOUS at 10:49

## 2023-06-01 RX ADMIN — HYDROMORPHONE HYDROCHLORIDE 0.5 MG: 1 INJECTION, SOLUTION INTRAMUSCULAR; INTRAVENOUS; SUBCUTANEOUS at 13:15

## 2023-06-01 RX ADMIN — CEFAZOLIN SODIUM 2 G: 2 INJECTION, SOLUTION INTRAVENOUS at 17:02

## 2023-06-01 RX ADMIN — FENTANYL CITRATE 50 MCG: 50 INJECTION, SOLUTION INTRAMUSCULAR; INTRAVENOUS at 13:15

## 2023-06-01 RX ADMIN — SODIUM CHLORIDE, POTASSIUM CHLORIDE, SODIUM LACTATE AND CALCIUM CHLORIDE 75 ML/HR: 600; 310; 30; 20 INJECTION, SOLUTION INTRAVENOUS at 16:21

## 2023-06-01 RX ADMIN — INSULIN LISPRO 5 UNITS: 100 INJECTION, SOLUTION INTRAVENOUS; SUBCUTANEOUS at 20:58

## 2023-06-01 RX ADMIN — OXYCODONE HYDROCHLORIDE AND ACETAMINOPHEN 1 TABLET: 7.5; 325 TABLET ORAL at 13:36

## 2023-06-01 RX ADMIN — ESCITALOPRAM OXALATE 20 MG: 20 TABLET ORAL at 19:31

## 2023-06-01 RX ADMIN — EPHEDRINE SULFATE 10 MG: 50 INJECTION INTRAVENOUS at 10:30

## 2023-06-01 RX ADMIN — LIDOCAINE HYDROCHLORIDE 0.5 ML: 10 INJECTION, SOLUTION EPIDURAL; INFILTRATION; INTRACAUDAL; PERINEURAL at 08:05

## 2023-06-01 RX ADMIN — TAMSULOSIN HYDROCHLORIDE 0.4 MG: 0.4 CAPSULE ORAL at 19:31

## 2023-06-01 RX ADMIN — FAMOTIDINE 20 MG: 20 TABLET ORAL at 08:05

## 2023-06-01 RX ADMIN — Medication 0.5 MG: at 13:15

## 2023-06-01 RX ADMIN — DEXAMETHASONE SODIUM PHOSPHATE 10 MG: 4 INJECTION INTRA-ARTICULAR; INTRALESIONAL; INTRAMUSCULAR; INTRAVENOUS; SOFT TISSUE at 10:03

## 2023-06-01 RX ADMIN — CEFAZOLIN SODIUM 2 G: 2 INJECTION, SOLUTION INTRAVENOUS at 10:04

## 2023-06-01 RX ADMIN — FINASTERIDE 5 MG: 5 TABLET, FILM COATED ORAL at 16:59

## 2023-06-01 RX ADMIN — CARVEDILOL 25 MG: 12.5 TABLET, FILM COATED ORAL at 17:02

## 2023-06-01 RX ADMIN — IBUPROFEN 600 MG: 600 TABLET ORAL at 19:32

## 2023-06-01 RX ADMIN — GABAPENTIN 400 MG: 400 CAPSULE ORAL at 19:30

## 2023-06-01 RX ADMIN — FENTANYL CITRATE 100 MCG: 50 INJECTION, SOLUTION INTRAMUSCULAR; INTRAVENOUS at 09:58

## 2023-06-01 RX ADMIN — CETIRIZINE HYDROCHLORIDE 5 MG: 10 TABLET, FILM COATED ORAL at 16:59

## 2023-06-01 RX ADMIN — EPHEDRINE SULFATE 10 MG: 50 INJECTION INTRAVENOUS at 10:09

## 2023-06-01 RX ADMIN — ROCURONIUM BROMIDE 10 MG: 10 SOLUTION INTRAVENOUS at 08:46

## 2023-06-01 RX ADMIN — ROCURONIUM BROMIDE 50 MG: 10 SOLUTION INTRAVENOUS at 09:58

## 2023-06-01 RX ADMIN — ROCURONIUM BROMIDE 15 MG: 10 SOLUTION INTRAVENOUS at 11:45

## 2023-06-01 RX ADMIN — PROPOFOL 200 MG: 10 INJECTION, EMULSION INTRAVENOUS at 09:58

## 2023-06-01 RX ADMIN — LORAZEPAM 2 MG: 1 TABLET ORAL at 19:31

## 2023-06-01 RX ADMIN — SODIUM CHLORIDE 100 ML/HR: 9 INJECTION, SOLUTION INTRAVENOUS at 08:21

## 2023-06-01 RX ADMIN — SODIUM CHLORIDE: 9 INJECTION, SOLUTION INTRAVENOUS at 11:03

## 2023-06-01 RX ADMIN — SUGAMMADEX 200 MG: 100 INJECTION, SOLUTION INTRAVENOUS at 12:33

## 2023-06-01 RX ADMIN — ATORVASTATIN CALCIUM 10 MG: 10 TABLET, FILM COATED ORAL at 19:31

## 2023-06-01 RX ADMIN — INSULIN LISPRO 3 UNITS: 100 INJECTION, SOLUTION INTRAVENOUS; SUBCUTANEOUS at 17:02

## 2023-06-01 RX ADMIN — LIDOCAINE HYDROCHLORIDE 100 MG: 10 INJECTION, SOLUTION EPIDURAL; INFILTRATION; INTRACAUDAL; PERINEURAL at 09:58

## 2023-06-01 RX ADMIN — ONDANSETRON 4 MG: 2 INJECTION INTRAMUSCULAR; INTRAVENOUS at 12:33

## 2023-06-01 RX ADMIN — MONTELUKAST 10 MG: 10 TABLET, FILM COATED ORAL at 19:31

## 2023-06-01 RX ADMIN — SACUBITRIL AND VALSARTAN 1 TABLET: 24; 26 TABLET, FILM COATED ORAL at 19:30

## 2023-06-01 RX ADMIN — MUPIROCIN: 20 OINTMENT TOPICAL at 22:38

## 2023-06-01 RX ADMIN — ROCURONIUM BROMIDE 25 MG: 10 SOLUTION INTRAVENOUS at 10:30

## 2023-06-01 RX ADMIN — DOXEPIN HYDROCHLORIDE 100 MG: 50 CAPSULE ORAL at 20:58

## 2023-06-01 NOTE — ANESTHESIA POSTPROCEDURE EVALUATION
Patient: Panda Henry    Procedure Summary     Date: 06/01/23 [facility]    Anesthesia Start:  Anesthesia Stop:     Procedure: CERVICAL DISCECTOMY ANTERIOR WITH FUSION C4-5, C5-6 (Spine Cervical) Diagnosis:       Cervical spondylosis with myelopathy      Cervical radiculopathy      (Cervical spondylosis with myelopathy [M47.12])      (Cervical radiculopathy [M54.12])    Surgeons: Zana Olmos MD Provider:     Anesthesia Type: general ASA Status: 3          Anesthesia Type: general    Vitals  No vitals data found for the desired time range.          Anesthesia Post Evaluation

## 2023-06-01 NOTE — PLAN OF CARE
Problem: Adult Inpatient Plan of Care  Goal: Plan of Care Review  Outcome: Ongoing, Progressing  Flowsheets (Taken 6/1/2023 1857)  Progress: improving  Plan of Care Reviewed With: patient  Outcome Evaluation: Patient arrived from PACU. VSS, RA, A&Ox4. Ambulated 100ft w/ nursing staff. Voiding well. No c/o pain. Incision to anterior neck HELDER w/ skin glue CDI. Up w/ 1 assist. Continue to monitor.  Goal: Patient-Specific Goal (Individualized)  Outcome: Ongoing, Progressing  Goal: Absence of Hospital-Acquired Illness or Injury  Outcome: Ongoing, Progressing  Intervention: Identify and Manage Fall Risk  Recent Flowsheet Documentation  Taken 6/1/2023 1849 by Cecilia Saunders, RN  Safety Promotion/Fall Prevention:   activity supervised   assistive device/personal items within reach   clutter free environment maintained   fall prevention program maintained   gait belt   toileting scheduled   safety round/check completed   nonskid shoes/slippers when out of bed   room organization consistent  Taken 6/1/2023 1600 by Cecilia Saunders, RN  Safety Promotion/Fall Prevention:   activity supervised   assistive device/personal items within reach   clutter free environment maintained   fall prevention program maintained   gait belt   toileting scheduled   safety round/check completed   room organization consistent   nonskid shoes/slippers when out of bed  Intervention: Prevent Skin Injury  Recent Flowsheet Documentation  Taken 6/1/2023 1849 by Cecilia Saunders, RN  Body Position: supine  Taken 6/1/2023 1600 by Cecilia Saunders, RN  Body Position: sitting up in bed  Intervention: Prevent and Manage VTE (Venous Thromboembolism) Risk  Recent Flowsheet Documentation  Taken 6/1/2023 1849 by Cecilia Saunders, RN  Activity Management:   ambulated to bathroom   ambulated outside room  VTE Prevention/Management:   bilateral   sequential compression devices on  Taken 6/1/2023 1600 by Cecilia Saunders, RN  VTE Prevention/Management:    bilateral   sequential compression devices on  Intervention: Prevent Infection  Recent Flowsheet Documentation  Taken 6/1/2023 1849 by Cecilia Saunders, RN  Infection Prevention: environmental surveillance performed  Taken 6/1/2023 1600 by Cecilia Saunders RN  Infection Prevention: environmental surveillance performed  Goal: Optimal Comfort and Wellbeing  Outcome: Ongoing, Progressing  Intervention: Provide Person-Centered Care  Recent Flowsheet Documentation  Taken 6/1/2023 1849 by Cecilia Saunders RN  Trust Relationship/Rapport: care explained  Taken 6/1/2023 1600 by Cecilia Saunders RN  Trust Relationship/Rapport:   choices provided   questions encouraged   questions answered   reassurance provided   thoughts/feelings acknowledged  Goal: Readiness for Transition of Care  Outcome: Ongoing, Progressing  Intervention: Mutually Develop Transition Plan  Recent Flowsheet Documentation  Taken 6/1/2023 1611 by Cecilia Saunders RN  Transportation Anticipated: family or friend will provide  Patient/Family Anticipated Services at Transition: none  Patient/Family Anticipates Transition to: home with family  Taken 6/1/2023 1610 by Cecilia Saunders RN  Equipment Currently Used at Home: none     Problem: Skin Injury Risk Increased  Goal: Skin Health and Integrity  Outcome: Ongoing, Progressing  Intervention: Optimize Skin Protection  Recent Flowsheet Documentation  Taken 6/1/2023 1849 by Cecilia Saunders RN  Head of Bed (HOB) Positioning: HOB at 45 degrees  Taken 6/1/2023 1600 by Cecilia Saunders RN  Head of Bed (HOB) Positioning: HOB at 60 degrees     Problem: Fall Injury Risk  Goal: Absence of Fall and Fall-Related Injury  Outcome: Ongoing, Progressing  Intervention: Identify and Manage Contributors  Recent Flowsheet Documentation  Taken 6/1/2023 1600 by Cecilia Saunders, RN  Medication Review/Management: medications reviewed  Intervention: Promote Injury-Free Environment  Recent Flowsheet Documentation  Taken  6/1/2023 1849 by Cecilia Saunders, RN  Safety Promotion/Fall Prevention:   activity supervised   assistive device/personal items within reach   clutter free environment maintained   fall prevention program maintained   gait belt   toileting scheduled   safety round/check completed   nonskid shoes/slippers when out of bed   room organization consistent  Taken 6/1/2023 1600 by Cecilia Saunders, RN  Safety Promotion/Fall Prevention:   activity supervised   assistive device/personal items within reach   clutter free environment maintained   fall prevention program maintained   gait belt   toileting scheduled   safety round/check completed   room organization consistent   nonskid shoes/slippers when out of bed     Problem: Diabetes Comorbidity  Goal: Blood Glucose Level Within Targeted Range  Outcome: Ongoing, Progressing  Intervention: Monitor and Manage Glycemia  Recent Flowsheet Documentation  Taken 6/1/2023 1600 by Cecilia Saunders, RN  Glycemic Management: blood glucose monitored     Problem: Bleeding (Spinal Surgery)  Goal: Absence of Bleeding  Outcome: Ongoing, Progressing     Problem: Bowel Motility Impaired (Spinal Surgery)  Goal: Effective Bowel Elimination  Outcome: Ongoing, Progressing     Problem: Fluid and Electrolyte Imbalance (Spinal Surgery)  Goal: Fluid and Electrolyte Balance  Outcome: Ongoing, Progressing     Problem: Functional Ability Impaired (Spinal Surgery)  Goal: Optimal Functional Ability  Outcome: Ongoing, Progressing  Intervention: Optimize Functional Status  Recent Flowsheet Documentation  Taken 6/1/2023 1849 by Cecilia Saunders, RN  Activity Management:   ambulated to bathroom   ambulated outside room  Positioning/Transfer Devices:   pillows   in use  Taken 6/1/2023 1600 by Cecilia Saunders, RN  Positioning/Transfer Devices:   pillows   in use     Problem: Infection (Spinal Surgery)  Goal: Absence of Infection Signs and Symptoms  Outcome: Ongoing, Progressing  Intervention: Prevent or  Manage Infection  Recent Flowsheet Documentation  Taken 6/1/2023 1849 by Cecilia Saunders RN  Infection Prevention: environmental surveillance performed  Taken 6/1/2023 1600 by Cecilia Saunders RN  Infection Prevention: environmental surveillance performed     Problem: Neurologic Impairment (Spinal Surgery)  Goal: Optimal Neurologic Function  Outcome: Ongoing, Progressing  Intervention: Optimize Neurologic Function  Recent Flowsheet Documentation  Taken 6/1/2023 1849 by Cecilia Saunders RN  Body Position: supine  Taken 6/1/2023 1600 by Cecilia Saunders RN  Body Position: sitting up in bed     Problem: Ongoing Anesthesia Effects (Spinal Surgery)  Goal: Anesthesia/Sedation Recovery  Outcome: Ongoing, Progressing  Intervention: Optimize Anesthesia Recovery  Recent Flowsheet Documentation  Taken 6/1/2023 1849 by Cecilia Saunders RN  Safety Promotion/Fall Prevention:   activity supervised   assistive device/personal items within reach   clutter free environment maintained   fall prevention program maintained   gait belt   toileting scheduled   safety round/check completed   nonskid shoes/slippers when out of bed   room organization consistent  Taken 6/1/2023 1600 by Cecilia Saunders RN  Safety Promotion/Fall Prevention:   activity supervised   assistive device/personal items within reach   clutter free environment maintained   fall prevention program maintained   gait belt   toileting scheduled   safety round/check completed   room organization consistent   nonskid shoes/slippers when out of bed     Problem: Pain (Spinal Surgery)  Goal: Acceptable Pain Control  Outcome: Ongoing, Progressing  Intervention: Prevent or Manage Pain  Recent Flowsheet Documentation  Taken 6/1/2023 1849 by Cecilia Saunders RN  Diversional Activities: television  Taken 6/1/2023 1600 by Cecilia Saunders RN  Diversional Activities: television     Problem: Postoperative Nausea and Vomiting (Spinal Surgery)  Goal: Nausea and Vomiting  Relief  Outcome: Ongoing, Progressing     Problem: Postoperative Urinary Retention (Spinal Surgery)  Goal: Effective Urinary Elimination  Outcome: Ongoing, Progressing     Problem: Respiratory Compromise (Spinal Surgery)  Goal: Effective Oxygenation and Ventilation  Outcome: Ongoing, Progressing  Intervention: Optimize Oxygenation and Ventilation  Recent Flowsheet Documentation  Taken 6/1/2023 1849 by Cecilia Saunders, RN  Head of Bed (HOB) Positioning: HOB at 45 degrees  Taken 6/1/2023 1600 by Cecilia Saunders, RN  Head of Bed (HOB) Positioning: HOB at 60 degrees   Goal Outcome Evaluation:  Plan of Care Reviewed With: patient        Progress: improving  Outcome Evaluation: Patient arrived from PACU. VSS, RA, A&Ox4. Ambulated 100ft w/ nursing staff. Voiding well. No c/o pain. Incision to anterior neck HELDER w/ skin glue CDI. Up w/ 1 assist. Continue to monitor.

## 2023-06-01 NOTE — ANESTHESIA POSTPROCEDURE EVALUATION
Patient: Panda Henry    Procedure Summary     Date: 06/01/23 [facility]    Anesthesia Start:  Anesthesia Stop:     Procedure: CERVICAL DISCECTOMY ANTERIOR WITH FUSION C4-5, C5-6 (Spine Cervical) Diagnosis:       Cervical spondylosis with myelopathy      Cervical radiculopathy      (Cervical spondylosis with myelopathy [M47.12])      (Cervical radiculopathy [M54.12])    Surgeons: Zana Olmos MD Provider:     Anesthesia Type: general ASA Status: 3          Anesthesia Type: general    Vitals  BP: 145/65  HR: 76  RR: 16  O2: 98%  Temp: 97.7 F      Post Anesthesia Care and Evaluation    Patient location during evaluation: PACU  Patient participation: complete - patient participated  Level of consciousness: awake  Pain score: 0  Pain management: adequate    Airway patency: patent  Anesthetic complications: No anesthetic complications  PONV Status: none  Cardiovascular status: hemodynamically stable and acceptable  Respiratory status: nonlabored ventilation, acceptable and nasal cannula  Hydration status: acceptable

## 2023-06-01 NOTE — ANESTHESIA PREPROCEDURE EVALUATION
Anesthesia Evaluation                  Airway   Mallampati: I  TM distance: >3 FB  Neck ROM: full  No difficulty expected  Dental      Pulmonary    (+) COPD, asthma,  Cardiovascular     ECG reviewed    (+) hypertension, CAD, cardiac stents hyperlipidemia,       Neuro/Psych  (+) headaches, numbness, psychiatric history,    GI/Hepatic/Renal/Endo    (+)  GERD,      Musculoskeletal     (+) neck pain,   Abdominal    Substance History      OB/GYN          Other   arthritis,    history of cancer                    Anesthesia Plan    ASA 3     general     intravenous induction     Anesthetic plan, risks, benefits, and alternatives have been provided, discussed and informed consent has been obtained with: patient.    Plan discussed with CRNA.        CODE STATUS:

## 2023-06-01 NOTE — OP NOTE
NEUROSURGICAL OPERATIVE NOTE        PREOPERATIVE DIAGNOSIS:    Cervical spondylosis with radiculomyelopathy      POSTOPERATIVE DIAGNOSIS:  Same      PROCEDURE:  1.  Arthrodesis C4-5 and C5-6  2.  Anterior cervical discectomy with microdissection at C4-5 and C5-6  3.  Zevo anterior plating C4-6  4.  Composite allografting C4-5 and C5-6      SURGEON:  Zana Olmos M.D.      ASSISTANT: Janeth Jones PA-C    PAC assisted with:   Suctioning   Retraction   Tying   Suturing   Closing   Application of dressing   Skilled neurosurgery PA assistance was necessary to perform this procedure.        ANESTHESIA:  General      ESTIMATED BLOOD LOSS: Minimal      SPECIMEN: None      DRAINS: None      COMPLICATIONS: Dural rent      Spinal Surgery Levels Completed:2 Levels        CLINICAL NOTE:  The patient is a 73-year-old gentleman with neck and bilateral upper extremity pain as well as numbness in the hands and some gait dysfunction.  Studies demonstrate cervical spondylosis and he is felt to have an early myelopathy in addition to some radicular symptoms.  As such, he presents at this time for ACDF C4-6.  The nature of the procedure as well as the potential risks, complications, limitations, and alternatives to the procedure were discussed at length with the patient and the patient has agreed to proceed with surgery.      TECHNICAL NOTE:  The patient was brought to the operating room and placed on the operating room table in the supine position. General endotracheal anesthesia was achieved.  His neck was maintained in the neutral position.  Given the rigidity of his neck, a roll was not placed under his shoulders.  His head was supported.  His anterior neck was prepared and draped in the usual fashion.  A mildly curved incision from the midline rightward was fashioned at about the level of the cricothyroid membrane.  Underlying subcutaneous tissues were undermined. The platysma was divided longitudinally.  A plane medial  to the belly of the sternocleidomastoid muscle was pursued to provide exposure to the anterior spine.  Substantial osteophytes were noted, particularly overlying the C4-5 disk space. The longus coli muscle was mobilized in the anterior spine with cautery.  Self-retaining retractor provided side to side exposure.  Multiple x-rays were obtained to localize the C4-5 and C5-6 disk spaces.  The drill was necessary to remove the large osteophyte overlying C4-5 and better define that disk space.  Diskectomy was performed at C4-5 using an array of pituitary rongeurs, curettes and punches.  The drill was used substantially to take down some of the calcified disk and osteophyte at this level.  After subtotal diskectomy the operating microscope was brought into use.  Significant overgrown ligament and osteophyte were resected as above.  The neural foramina were decompressed with Kerrison punches.  Ultimately, there was good decompression of the thecal sac and nerve roots.  The endplates were decorticated and a small wedge of bone was left posteriorly on each endplate.  A 6 mm lordotic composite allograft was then impacted into place.  Attention was turned to the level below.  The distraction screws that had previously been placed at C4 were moved to the C6-7 level. Once again, this disk space was collapsed and spondylotic.  The drill was necessary to define the disk space.  Large osteophytes posteriorly were fractured off and undercut with Kerrison punches.  However, there remained some osteophyte, calcified ligament attached to the dura.  I made efforts to remove this and ended up with some degree of dural rent with some CSF egressed.  With further attempts, I elected not to remove the calcified ligament/osteophyte that was free-floating and adherent to the dura.  There was concern about worsening the CSF leak or fashioning neurologic injury.  However, the dural sac at that level despite the calcified ligament was  well-decompressed as were the nerve roots.  Once again, endplates were decorticated.  A small ledge of bone was left posteriorly on each endplate.  A 5 mm lordotic composite allograft was impacted into place.  Anterior osteophytes were further removed.  Ultimately, a 37 mm Zevo plate was affixed to the anterior spine from C4 to C6 using 13 mm variable angle screws bilaterally at C4, C5 and C6.  This was done with fluoroscopic guidance.  Locking cams were engaged at each level.  I will note, working at the C5-6 level prior to placing the 5 mm lordotic composite allograft, a small portion of nonsuturable DuraGen was placed over the area of the small dural rent.  The graft was then impacted into place.  There was no CSF leakage around the graft at all, and it appeared quite dry.  After affixing the plate, modest bleeding points were controlled with bipolar cautery. The wound was washed out with a saline solution.  The platysma was reapproximated in interrupted fashion with 3-0 Vicryl suture.  The skin was closed in a running subcuticular fashion with 3-0 Vicryl suture.  A dermal sealant and sterile dressing were applied.  Completion fluoroscopy confirmed good positioning of the construct at C4-6 as desired.  The patient was subsequently extubated and taken to the recovery room in satisfactory condition.  There were no overt intraoperative complications other than the small dural rent referenced above.              Zana Olmos M.D.

## 2023-06-01 NOTE — INTERVAL H&P NOTE
[facility] Pre-op    Full history and physical note from office is attached.    +cardiac clearance     BP (!) 220/104 (BP Location: Right arm, Patient Position: Lying)   Pulse 88   Temp 97.2 °F (36.2 °C) (Temporal)   Resp 18   SpO2 97%     Immunizations:  Influenza:  2022  Pneumococcal:  UTD  Tetanus:  UTD  Covid : x2    LAB Results:  Lab Results   Component Value Date    WBC 5.63 05/24/2023    HGB 15.2 05/24/2023    HCT 45.2 05/24/2023    MCV 88.1 05/24/2023     05/24/2023    NEUTROABS 3.74 05/24/2023    GLUCOSE 178 (H) 05/24/2023    BUN 9 05/24/2023    CREATININE 0.83 05/24/2023    EGFRIFNONA 83 08/03/2020     05/24/2023    K 4.0 05/24/2023     05/24/2023    CO2 28.0 05/24/2023    MG 2.1 08/02/2020    CALCIUM 8.8 05/24/2023    ALBUMIN 4.4 05/24/2023    AST 16 05/24/2023    ALT 19 05/24/2023    BILITOT 0.5 05/24/2023    INR 0.95 05/24/2023       Cancer Staging (if applicable)  Cancer Patient: __ yes __no __unknown__N/A; If yes, clinical stage T:__ N:__M:__, stage group or __N/A      Impression: Cervical spondylosis with radiculomyelopathy      Plan: CERVICAL DISCECTOMY ANTERIOR WITH FUSION C4-5, C5-6      ZACKARY Buenrostro   6/1/2023   07:56 EDT

## 2023-06-01 NOTE — ANESTHESIA PROCEDURE NOTES
Airway  Urgency: elective    Date/Time: 6/1/2023 10:00 AM  Airway not difficult    General Information and Staff    Patient location during procedure: OR  CRNA/CAA: Chloe Mcleod CRNA    Indications and Patient Condition  Indications for airway management: airway protection    Preoxygenated: yes  MILS maintained throughout  Mask difficulty assessment: 2 - vent by mask + OA or adjuvant +/- NMBA    Final Airway Details  Final airway type: endotracheal airway      Successful airway: ETT     Successful intubation technique: video laryngoscopy  Facilitating devices/methods: intubating stylet and OPA  Endotracheal tube insertion site: oral  Blade: Bal  Blade size: 4  ETT size (mm): 7.5  Cormack-Lehane Classification: grade I - full view of glottis  Placement verified by: chest auscultation and capnometry   Measured from: lips  ETT/EBT  to lips (cm): 22  Number of attempts at approach: 1  Assessment: lips, teeth, and gum same as pre-op and atraumatic intubation    Additional Comments  Negative epigastric sounds, symmetrical chest rise and fall, elective Bal used d/t limited ROM, dentition and lips unchanged, +BBS, +ETCO2

## 2023-06-02 ENCOUNTER — TELEPHONE (OUTPATIENT)
Dept: NEUROSURGERY | Facility: CLINIC | Age: 73
End: 2023-06-02

## 2023-06-02 VITALS
OXYGEN SATURATION: 95 % | HEART RATE: 78 BPM | SYSTOLIC BLOOD PRESSURE: 162 MMHG | DIASTOLIC BLOOD PRESSURE: 74 MMHG | RESPIRATION RATE: 16 BRPM | TEMPERATURE: 98.3 F

## 2023-06-02 DIAGNOSIS — M47.12 CERVICAL SPONDYLOSIS WITH MYELOPATHY: ICD-10-CM

## 2023-06-02 DIAGNOSIS — Z98.890 STATUS POST CERVICAL DISCECTOMY: Primary | ICD-10-CM

## 2023-06-02 LAB
GLUCOSE BLDC GLUCOMTR-MCNC: 186 MG/DL (ref 70–130)
GLUCOSE BLDC GLUCOMTR-MCNC: 236 MG/DL (ref 70–130)

## 2023-06-02 PROCEDURE — 25010000002 HYDRALAZINE PER 20 MG: Performed by: PHYSICIAN ASSISTANT

## 2023-06-02 PROCEDURE — 99024 POSTOP FOLLOW-UP VISIT: CPT | Performed by: NEUROLOGICAL SURGERY

## 2023-06-02 PROCEDURE — 63710000001 INSULIN LISPRO (HUMAN) PER 5 UNITS: Performed by: NEUROLOGICAL SURGERY

## 2023-06-02 PROCEDURE — 94640 AIRWAY INHALATION TREATMENT: CPT

## 2023-06-02 PROCEDURE — 25010000002 CEFAZOLIN IN DEXTROSE 2-4 GM/100ML-% SOLUTION: Performed by: NEUROLOGICAL SURGERY

## 2023-06-02 PROCEDURE — 94761 N-INVAS EAR/PLS OXIMETRY MLT: CPT

## 2023-06-02 PROCEDURE — 94799 UNLISTED PULMONARY SVC/PX: CPT

## 2023-06-02 PROCEDURE — 82948 REAGENT STRIP/BLOOD GLUCOSE: CPT

## 2023-06-02 RX ORDER — HYDRALAZINE HYDROCHLORIDE 20 MG/ML
20 INJECTION INTRAMUSCULAR; INTRAVENOUS ONCE
Status: COMPLETED | OUTPATIENT
Start: 2023-06-02 | End: 2023-06-02

## 2023-06-02 RX ORDER — OXYCODONE HYDROCHLORIDE AND ACETAMINOPHEN 5; 325 MG/1; MG/1
1 TABLET ORAL 3 TIMES DAILY PRN
Qty: 20 TABLET | Refills: 0 | Status: SHIPPED | OUTPATIENT
Start: 2023-06-02

## 2023-06-02 RX ORDER — CLOPIDOGREL BISULFATE 75 MG/1
75 TABLET ORAL DAILY
Qty: 90 TABLET | Refills: 3
Start: 2023-06-07

## 2023-06-02 RX ADMIN — BUDESONIDE AND FORMOTEROL FUMARATE DIHYDRATE 2 PUFF: 160; 4.5 AEROSOL RESPIRATORY (INHALATION) at 08:22

## 2023-06-02 RX ADMIN — LORAZEPAM 2 MG: 1 TABLET ORAL at 08:47

## 2023-06-02 RX ADMIN — CEFAZOLIN SODIUM 2 G: 2 INJECTION, SOLUTION INTRAVENOUS at 01:57

## 2023-06-02 RX ADMIN — SACUBITRIL AND VALSARTAN 1 TABLET: 24; 26 TABLET, FILM COATED ORAL at 08:47

## 2023-06-02 RX ADMIN — PANTOPRAZOLE SODIUM 40 MG: 40 TABLET, DELAYED RELEASE ORAL at 04:32

## 2023-06-02 RX ADMIN — HYDRALAZINE HYDROCHLORIDE 20 MG: 20 INJECTION INTRAMUSCULAR; INTRAVENOUS at 04:54

## 2023-06-02 RX ADMIN — INSULIN LISPRO 5 UNITS: 100 INJECTION, SOLUTION INTRAVENOUS; SUBCUTANEOUS at 11:54

## 2023-06-02 RX ADMIN — CETIRIZINE HYDROCHLORIDE 5 MG: 10 TABLET, FILM COATED ORAL at 08:47

## 2023-06-02 RX ADMIN — GABAPENTIN 400 MG: 400 CAPSULE ORAL at 08:47

## 2023-06-02 RX ADMIN — Medication 3 ML: at 08:49

## 2023-06-02 RX ADMIN — OXYCODONE HYDROCHLORIDE AND ACETAMINOPHEN 1 TABLET: 7.5; 325 TABLET ORAL at 01:57

## 2023-06-02 RX ADMIN — IBUPROFEN 600 MG: 600 TABLET ORAL at 04:32

## 2023-06-02 RX ADMIN — INSULIN LISPRO 3 UNITS: 100 INJECTION, SOLUTION INTRAVENOUS; SUBCUTANEOUS at 08:46

## 2023-06-02 RX ADMIN — FINASTERIDE 5 MG: 5 TABLET, FILM COATED ORAL at 08:47

## 2023-06-02 NOTE — PROGRESS NOTES
NEUROSURGERY PROGRESS NOTE     LOS: 1 day   Patient Care Team:  Alberto Green MD as PCP - General (Family Medicine)  Moisés Bansal MD as Consulting Physician (Cardiology)    Chief Complaint: Neck and arm pain with sensory alteration involving the hands with gait disturbance.    POD#: 1 Day Post-Op  Procedures:  ACDF C4-6.    Interval History:   Patient Complaints: Modest neck pain.  His preoperative shoulder pain is improved.  Patient Denies: Headache, weakness, numbness.    Vital Signs: Blood pressure 163/70, pulse 62, temperature 98.3 °F (36.8 °C), temperature source Oral, resp. rate 16, SpO2 95 %.  Intake/Output:     Intake/Output Summary (Last 24 hours) at 6/2/2023 0844  Last data filed at 6/1/2023 1919  Gross per 24 hour   Intake 1520 ml   Output --   Net 1520 ml       Physical Exam:  The patient is alert and sitting up at bedside eating breakfast.  Mild ecchymosis is noted along his incision line but there is no swelling.  His voice is normal.     Data Review:    Accu-Cheks: 137-247    Assessment/Plan:  1.  Cervical spondylosis with myelopathy status post ACDF C4-6.  2.  Interoperative dural rent.  3.  COPD.  4.  Coronary artery disease.  5.  Hypertension.  6.  Diabetes mellitus.  7.  Disposition: Home today.  He is to keep his head of bed at 40 degrees or higher at all times until follow-up in 2 weeks with [facility] in my office.      Zana Olmos MD  06/02/23  08:44 EDT

## 2023-06-02 NOTE — CASE MANAGEMENT/SOCIAL WORK
Case Management Discharge Note      Final Note: Home, no d/c needs identified         Selected Continued Care - Discharged on 6/2/2023 Admission date: 6/1/2023 - Discharge disposition: Home or Self Care    Destination    No services have been selected for the patient.              Durable Medical Equipment    No services have been selected for the patient.              Dialysis/Infusion    No services have been selected for the patient.              Home Medical Care    No services have been selected for the patient.              Therapy    No services have been selected for the patient.              Community Resources    No services have been selected for the patient.              Community & DME    No services have been selected for the patient.                       Final Discharge Disposition Code: 01 - home or self-care

## 2023-06-02 NOTE — PLAN OF CARE
Patient ambulated in hallway approximately 370 ft with one assist/walker. A&Ox4, mood/affect appropriate. Liquid adhesive/transparent film to anterior neck CDI, free of drainage. Patient denies pain/discomfort at this time. SBP elevated at 174 at the beginning of the shift. Will cont to mx. Call light in reach.

## 2023-06-02 NOTE — NURSING NOTE
Order of IV Hydralazine obtained/administered r/t significantly elevated  BP at 0400. (208/90). Patient denies pain/discomfort. Current /65.

## 2023-06-02 NOTE — PLAN OF CARE
Goal Outcome Evaluation:  Plan of Care Reviewed With: patient        D/C home      Problem: Adult Inpatient Plan of Care  Goal: Plan of Care Review  Outcome: Met  Goal: Patient-Specific Goal (Individualized)  Outcome: Met  Goal: Absence of Hospital-Acquired Illness or Injury  Outcome: Met  Intervention: Identify and Manage Fall Risk  Recent Flowsheet Documentation  Taken 6/2/2023 1200 by Cecilia Saunders RN  Safety Promotion/Fall Prevention:   activity supervised   assistive device/personal items within reach   clutter free environment maintained   fall prevention program maintained   gait belt   toileting scheduled   safety round/check completed   room organization consistent   nonskid shoes/slippers when out of bed  Taken 6/2/2023 1055 by Cecilia Saunders RN  Safety Promotion/Fall Prevention:   activity supervised   assistive device/personal items within reach   clutter free environment maintained   gait belt   fall prevention program maintained   toileting scheduled   safety round/check completed   room organization consistent  Taken 6/2/2023 0845 by Cecilia Saunders RN  Safety Promotion/Fall Prevention:   activity supervised   assistive device/personal items within reach   clutter free environment maintained   fall prevention program maintained   gait belt   toileting scheduled   safety round/check completed   room organization consistent   nonskid shoes/slippers when out of bed  Intervention: Prevent Skin Injury  Recent Flowsheet Documentation  Taken 6/2/2023 1200 by Cecilia Saunders RN  Body Position: (up in chair) other (see comments)  Taken 6/2/2023 1055 by Cecilia Saunders RN  Body Position: (up in chair) other (see comments)  Skin Protection: adhesive use limited  Taken 6/2/2023 0845 by Cecilia Saunders RN  Body Position: dangle, side of bed  Intervention: Prevent and Manage VTE (Venous Thromboembolism) Risk  Recent Flowsheet Documentation  Taken 6/2/2023 1200 by Cecilia Saunders RN  VTE  Prevention/Management:   bilateral   sequential compression devices off  Taken 6/2/2023 1055 by Cecilia Saunders RN  VTE Prevention/Management:   bilateral   sequential compression devices off  Taken 6/2/2023 0845 by Cecilia Saunders RN  VTE Prevention/Management:   bilateral   sequential compression devices on  Intervention: Prevent Infection  Recent Flowsheet Documentation  Taken 6/2/2023 1200 by Cecilia Saunders RN  Infection Prevention: environmental surveillance performed  Taken 6/2/2023 1055 by Cecilia Saunders RN  Infection Prevention: environmental surveillance performed  Taken 6/2/2023 0845 by Cecilia Saunders RN  Infection Prevention: environmental surveillance performed  Goal: Optimal Comfort and Wellbeing  Outcome: Met  Intervention: Provide Person-Centered Care  Recent Flowsheet Documentation  Taken 6/2/2023 1200 by Cecilia Saunders RN  Trust Relationship/Rapport:   care explained   choices provided  Taken 6/2/2023 0845 by Cecilia Saunders RN  Trust Relationship/Rapport:   care explained   choices provided   questions encouraged   questions answered   reassurance provided   thoughts/feelings acknowledged  Goal: Readiness for Transition of Care  Outcome: Met     Problem: Skin Injury Risk Increased  Goal: Skin Health and Integrity  Outcome: Met  Intervention: Optimize Skin Protection  Recent Flowsheet Documentation  Taken 6/2/2023 1200 by Cecilia Saunders RN  Head of Bed (HOB) Positioning: HOB at 60-90 degrees  Taken 6/2/2023 1055 by Cecilia Saunders RN  Pressure Reduction Techniques: frequent weight shift encouraged  Head of Bed (HOB) Positioning: HOB at 60 degrees  Pressure Reduction Devices: positioning supports utilized  Skin Protection: adhesive use limited  Taken 6/2/2023 0845 by Cecilia Saunders RN  Head of Bed (HOB) Positioning: HOB at 60 degrees     Problem: Fall Injury Risk  Goal: Absence of Fall and Fall-Related Injury  Outcome: Met  Intervention: Identify and Manage  Contributors  Recent Flowsheet Documentation  Taken 6/2/2023 0845 by Cecilia Saunders, RN  Medication Review/Management: medications reviewed  Intervention: Promote Injury-Free Environment  Recent Flowsheet Documentation  Taken 6/2/2023 1200 by Cecilia Saunders, RN  Safety Promotion/Fall Prevention:   activity supervised   assistive device/personal items within reach   clutter free environment maintained   fall prevention program maintained   gait belt   toileting scheduled   safety round/check completed   room organization consistent   nonskid shoes/slippers when out of bed  Taken 6/2/2023 1055 by Cecilia Saunders, RN  Safety Promotion/Fall Prevention:   activity supervised   assistive device/personal items within reach   clutter free environment maintained   gait belt   fall prevention program maintained   toileting scheduled   safety round/check completed   room organization consistent  Taken 6/2/2023 0845 by Cecilia Saunders, RN  Safety Promotion/Fall Prevention:   activity supervised   assistive device/personal items within reach   clutter free environment maintained   fall prevention program maintained   gait belt   toileting scheduled   safety round/check completed   room organization consistent   nonskid shoes/slippers when out of bed     Problem: Diabetes Comorbidity  Goal: Blood Glucose Level Within Targeted Range  Outcome: Met     Problem: Bleeding (Spinal Surgery)  Goal: Absence of Bleeding  Outcome: Met     Problem: Bowel Motility Impaired (Spinal Surgery)  Goal: Effective Bowel Elimination  Outcome: Met     Problem: Fluid and Electrolyte Imbalance (Spinal Surgery)  Goal: Fluid and Electrolyte Balance  Outcome: Met     Problem: Functional Ability Impaired (Spinal Surgery)  Goal: Optimal Functional Ability  Outcome: Met  Intervention: Optimize Functional Status  Recent Flowsheet Documentation  Taken 6/2/2023 1200 by Cecilia Saunders, RN  Positioning/Transfer Devices:   pillows   in use  Taken  6/2/2023 1055 by Cecilia Saunders RN  Positioning/Transfer Devices:   pillows   in use  Taken 6/2/2023 0845 by Cecilia Saunders RN  Positioning/Transfer Devices:   pillows   in use     Problem: Infection (Spinal Surgery)  Goal: Absence of Infection Signs and Symptoms  Outcome: Met  Intervention: Prevent or Manage Infection  Recent Flowsheet Documentation  Taken 6/2/2023 1200 by Cecilia Saunders RN  Infection Prevention: environmental surveillance performed  Taken 6/2/2023 1055 by Cecilia Saunders RN  Infection Prevention: environmental surveillance performed  Taken 6/2/2023 0845 by Cecilia Saunders RN  Infection Prevention: environmental surveillance performed     Problem: Neurologic Impairment (Spinal Surgery)  Goal: Optimal Neurologic Function  Outcome: Met  Intervention: Optimize Neurologic Function  Recent Flowsheet Documentation  Taken 6/2/2023 1200 by Cecilia Saunders RN  Body Position: (up in chair) other (see comments)  Taken 6/2/2023 1055 by Cecilia Saunders RN  Body Position: (up in chair) other (see comments)  Pressure Reduction Devices: positioning supports utilized  Taken 6/2/2023 0845 by Cecilia Saunders RN  Body Position: dangle, side of bed     Problem: Ongoing Anesthesia Effects (Spinal Surgery)  Goal: Anesthesia/Sedation Recovery  Outcome: Met  Intervention: Optimize Anesthesia Recovery  Recent Flowsheet Documentation  Taken 6/2/2023 1200 by Cecilia Saunders RN  Safety Promotion/Fall Prevention:   activity supervised   assistive device/personal items within reach   clutter free environment maintained   fall prevention program maintained   gait belt   toileting scheduled   safety round/check completed   room organization consistent   nonskid shoes/slippers when out of bed  Taken 6/2/2023 1055 by Cecilia Saunders RN  Safety Promotion/Fall Prevention:   activity supervised   assistive device/personal items within reach   clutter free environment maintained   gait belt   fall  prevention program maintained   toileting scheduled   safety round/check completed   room organization consistent  Taken 6/2/2023 0845 by Cecilia Saunders, RN  Safety Promotion/Fall Prevention:   activity supervised   assistive device/personal items within reach   clutter free environment maintained   fall prevention program maintained   gait belt   toileting scheduled   safety round/check completed   room organization consistent   nonskid shoes/slippers when out of bed     Problem: Pain (Spinal Surgery)  Goal: Acceptable Pain Control  Outcome: Met  Intervention: Prevent or Manage Pain  Recent Flowsheet Documentation  Taken 6/2/2023 1200 by Cecilia Saunders RN  Diversional Activities: television  Taken 6/2/2023 1055 by Cecilia Saunders RN  Diversional Activities: television  Taken 6/2/2023 0845 by Cecilia Saunders RN  Diversional Activities: television     Problem: Postoperative Nausea and Vomiting (Spinal Surgery)  Goal: Nausea and Vomiting Relief  Outcome: Met     Problem: Postoperative Urinary Retention (Spinal Surgery)  Goal: Effective Urinary Elimination  Outcome: Met  Intervention: Monitor and Manage Urinary Retention  Recent Flowsheet Documentation  Taken 6/2/2023 1200 by Cecilia Saunders RN  Urinary Elimination Promotion: toileting device within reach  Taken 6/2/2023 1055 by Cecilia Saunders RN  Urinary Elimination Promotion: toileting device within reach  Taken 6/2/2023 0845 by Cecilia Saunders RN  Urinary Elimination Promotion: toileting device within reach     Problem: Respiratory Compromise (Spinal Surgery)  Goal: Effective Oxygenation and Ventilation  Outcome: Met  Intervention: Optimize Oxygenation and Ventilation  Recent Flowsheet Documentation  Taken 6/2/2023 1200 by Cecilia Saunders RN  Head of Bed (HOB) Positioning: HOB at 60-90 degrees  Taken 6/2/2023 1055 by Cecilia Saunders RN  Head of Bed (HOB) Positioning: HOB at 60 degrees  Taken 6/2/2023 0845 by Cecilia Saunders, MARYLU  Head  of Bed (HOB) Positioning: HOB at 60 degrees

## 2023-06-02 NOTE — TELEPHONE ENCOUNTER
Post Op appt has been sched for 06/16/23 w/ Rosetta Jones. Pt is advised to get Xrays pror to appt. Mailed appt reminder.

## 2023-06-02 NOTE — DISCHARGE SUMMARY
[facility]    Date of Admission: 6/1/2023  Date of Discharge:  6/2/2023    Discharge Diagnosis: Cervical spondylosis with myelopathy status post ACDF C4-6     Procedures Performed  Procedure(s):  CERVICAL DISCECTOMY ANTERIOR WITH FUSION C4-5, C5-6       History of Present Illness:  Mr. Henry is a 73-year old retired  that presented with neck and bilateral upper extremity pain as well as numbness in the hands and some gait dysfunction. Studies demonstrated cervical spondylosis and he is felt to have an early myelopathy with addition to radicular symptoms. As such, he underwent ACDF C4-6 by Dr. Olmos on 5/1/23.     Hospital Course:   He was discharged 1 day postoperatively. Unfortunately, during his procedure there was an intraoperative dural rent where he had to be admitted for observance. He remained pleasant and cooperative. No voice changes. Preoperative symptoms were improved. He denied headache, weakness, and numbness. Incision showed mild ecchymosis but no swelling was observed. He was discharged home today and was to keep the head of his bed at 40 degrees or higher at all times until follow-up in 2 weeks with [facility].     Condition on Discharge:  Stable  Discharge to: Home       Discharge Medications     Discharge Medications      New Medications      Instructions Start Date   oxyCODONE-acetaminophen 5-325 MG per tablet  Commonly known as: PERCOCET   Take 1 tablet by mouth 3 (Three) Times a Day As Needed for Pain.         Changes to Medications      Instructions Start Date   clopidogrel 75 MG tablet  Commonly known as: PLAVIX  What changed: These instructions start on June 7, 2023. If you are unsure what to do until then, ask your doctor or other care provider.   75 mg, Oral, Daily   Start Date: June 7, 2023        Continue These Medications      Instructions Start Date   albuterol sulfate  (90 Base) MCG/ACT  inhaler  Commonly known as: PROVENTIL HFA;VENTOLIN HFA;PROAIR HFA   2 puffs, Inhalation, Every 4 Hours PRN      aspirin 81 MG EC tablet   81 mg, Oral, Daily      budesonide-formoterol 160-4.5 MCG/ACT inhaler  Commonly known as: SYMBICORT   INHALE 2 PUFFS TWICE A DAY FOR 30 DAYS      carboxymethylcellulose 0.5 % solution  Commonly known as: REFRESH PLUS   3 Times Daily PRN      carvedilol 25 MG tablet  Commonly known as: COREG   25 mg, Oral, 2 Times Daily With Meals      diclofenac 75 MG EC tablet  Commonly known as: VOLTAREN   TAKE 1 TABLET BY MOUTH 2 TIMES A DAY FOR 30 DAYS.      doxepin 100 MG capsule  Commonly known as: SINEquan   100 mg, Oral, Every Night at Bedtime      Entresto 24-26 MG tablet  Generic drug: sacubitril-valsartan   TAKE 1 TABLET BY MOUTH TWICE A DAY      escitalopram 20 MG tablet  Commonly known as: LEXAPRO   TAKE 2 & 1/2 TABLETS BY MOUTH DAILY 90 DAYS      ezetimibe 10 MG tablet  Commonly known as: ZETIA   10 mg, Oral, Daily      finasteride 5 MG tablet  Commonly known as: PROSCAR   5 mg, Oral, Daily      gabapentin 400 MG capsule  Commonly known as: NEURONTIN   1 capsule, Oral, 3 Times Daily      glucose 4-6 GM-MG per chewable tablet   2 tablets, Oral, As Needed      insulin aspart 100 UNIT/ML solution pen-injector sc pen  Commonly known as: novoLOG FLEXPEN   14 Units, Subcutaneous, 2 Times Daily With Meals, 16 units in the am, 14 units in the Pm      insulin glargine 100 UNIT/ML injection  Commonly known as: LANTUS, SEMGLEE   54 Units, Subcutaneous, Daily      loratadine 10 MG tablet  Commonly known as: CLARITIN   10 mg, Oral, Daily      LORazepam 2 MG tablet  Commonly known as: ATIVAN   2 mg, Oral, 2 Times Daily      lovastatin 40 MG tablet  Commonly known as: MEVACOR   40 mg, Oral, Nightly      montelukast 10 MG tablet  Commonly known as: SINGULAIR   10 mg, Oral, Nightly      nitroglycerin 0.4 MG SL tablet  Commonly known as: NITROSTAT   0.4 mg, Sublingual, Every 5 Minutes PRN, Take no  more than 3 doses in 15 minutes.       omeprazole 40 MG capsule  Commonly known as: priLOSEC   TAKE 1 CAPSULE BY MOUTH EVERY DAY 30 MINUTES BEFORE MORNING MEAL      salsalate 750 MG tablet  Commonly known as: DISALCID   750 mg, Oral, 2 Times Daily      tamsulosin 0.4 MG capsule 24 hr capsule  Commonly known as: FLOMAX   1 capsule, Oral, Daily             Follow-up Appointments  Future Appointments   Date Time Provider Department Center   6/16/2023 12:30 PM Rosetta Jones PA-C [facility]   10/2/2023 11:00 AM Alberto Green MD [facility]   4/30/2024  9:45 AM Moisés Bansal MD [facility]     Additional Instructions for the Follow-ups that You Need to Schedule     Discharge Follow-up with Specified Provider: Nickolas; 2 Weeks   As directed      To: Nickolas    Follow Up: 2 Weeks    Follow Up Details: Follow-up with physician's assistant in approximately 2 weeks with AP and lateral cervical spine x-rays.         Discharge Follow-up with Specified Provider: Nickolas; 2 Weeks   As directed      To: Nickolas    Follow Up: 2 Weeks    Follow Up Details: Follow-up with physician's assistant in approximately 2 weeks with AP and lateral cervical spine x-rays. Keep head of bed 40 degrees or higher at all times until follow-up.               Referring Provider  Zana Olmos MD    PCP   Alberto Green MD Chaz Clusky, PA-C  06/02/23  16:43 EDT

## 2023-06-02 NOTE — TELEPHONE ENCOUNTER
Can someone pend in an order for  AP and lateral cervical spine x-rays needed for 2 wks f/u Post Cervical Discectomy w/ Fusion. Thank you.

## 2023-06-03 ENCOUNTER — READMISSION MANAGEMENT (OUTPATIENT)
Dept: CALL CENTER | Facility: HOSPITAL | Age: 73
End: 2023-06-03

## 2023-06-03 NOTE — OUTREACH NOTE
Prep Survey    Flowsheet Row Responses   [facility] facility patient discharged from? [place]   Is LACE score < 7 ? No   Eligibility Readm Mgmt   Discharge diagnosis CERVICAL DISCECTOMY ANTERIOR WITH FUSION C4-5, C5-6   Does the patient have one of the following disease processes/diagnoses(primary or secondary)? General Surgery   Does the patient have Home health ordered? No   Is there a DME ordered? No   Prep survey completed? Yes          GAYLA PATEL - Registered Nurse         96

## 2023-06-05 ENCOUNTER — NURSE TRIAGE (OUTPATIENT)
Dept: CALL CENTER | Facility: HOSPITAL | Age: 73
End: 2023-06-05
Payer: MEDICARE

## 2023-06-05 ENCOUNTER — TELEPHONE (OUTPATIENT)
Dept: FAMILY MEDICINE CLINIC | Facility: CLINIC | Age: 73
End: 2023-06-05

## 2023-06-05 NOTE — TELEPHONE ENCOUNTER
CALLER ON VERBAL     Caller: ZELDA EVERETT    Relationship: Emergency Contact    Best call back number: [phone]     What orders are you requesting (i.e. lab or imaging): HOME HEALTH     In what timeframe would the patient need to come in: ASAP    Additional notes: PATIENTS WIFE STATES HE WAS BROUGHT TO THE HOSPITAL 6.4.23 DUE TO WEAKNESS AFTER FALLING. SHE IS REQUESTING SOME ORDERS FOR HOME HEALTH TO HELP HIM RECOVER AND GET A HOSPITAL BED.     PLEASE CALL BACK TO GIVE ADVICE, IF NO ANSWER LEAVE A DETAILED MESSAGE.

## 2023-06-05 NOTE — TELEPHONE ENCOUNTER
Caller states he has his father in the emergency room now and the ER physician plans to release his father home, stating there is nothing else to be done for his father.  Advised the caller to voice his concerns to the ER charge nurse.  The caller states he cannot handle his father at home, states his father cannot feel his legs.    Reason for Disposition   Nursing judgment    Additional Information   Negative: Nursing judgment   Negative: Information only call; adult is not ill or injured   Negative: Nursing judgment   Negative: Nursing judgment   Negative: Nursing judgment   Negative: Nursing judgment   Negative: Nursing judgment   Negative: Nursing judgment   Negative: Nursing judgment   Negative: Nursing judgment   Negative: Nursing judgment   Negative: Nursing judgment   Negative: Nursing judgment   Negative: Nursing judgment    Protocols used: No Guideline or Reference Available-ADULT-

## 2023-06-05 NOTE — TELEPHONE ENCOUNTER
"Reason for Disposition   Injury (or injuries) that need emergency care    Additional Information   Negative: Major injury from dangerous force (e.g., fall > 10 feet or 3 meters)   Negative: [1] Major bleeding (e.g., actively dripping or spurting) AND [2] can't be stopped   Negative: Shock suspected (e.g., cold/pale/clammy skin, too weak to stand)   Negative: Difficult to awaken or acting confused (e.g., disoriented, slurred speech)   Negative: [1] SEVERE weakness (i.e., unable to walk or barely able to walk, requires support) AND [2] new-onset or worsening   Negative: [1] Can't stand (bear weight) or walk AND [2] new-onset after fall   Negative: Sounds like a life-threatening emergency to the triager   Negative: Fainted (passed out)   Negative: New-onset or worsening weakness of the face, arm or leg on one side of the body   Negative: [1] New-onset or worsening dizziness AND [2] described as spinning or off balance (i.e., vertigo)   Negative: [1] New-onset or worsening dizziness AND [2] NO spinning sensation or trouble with balance   Negative: New-onset or worsening pale skin (pallor)   Negative: Pregnant and fall   Negative: Patient has a concerning injury to a specific part of the body (e.g., chest, leg, head)   Negative: Patient has a wound (abrasion, cut, puncture, other skin injury or tear)   Negative: Sounds like a serious injury to the triager   Negative: [1] Muscle pain AND [2] dark (cola colored) or red-colored urine    Answer Assessment - Initial Assessment Questions  1. MECHANISM: \"How did the fall happen?\"      Slid out of his chir karsten  2. DOMESTIC VIOLENCE AND ELDER ABUSE SCREENING: \"Did you fall because someone pushed you or tried to hurt you?\" If Yes, ask: \"Are you safe now?\"      no9  3. ONSET: \"When did the fall happen?\" (e.g., minutes, hours, or days ago)      today  4. LOCATION: \"What part of the body hit the ground?\" (e.g., back, buttocks, head, hips, knees, hands, head, stomach)       " "neck  5.neck swollen around here his incision is.JURY: \"Did you hurt (injure) yourself when you fell?\" If Yes, ask: \"What did you injure? Tell me more about this?\" (e.g., body area; type of injury; pain severity)\"      no  6. PAIN: \"Is there any pain?\" If Yes, ask: \"How bad is the pain?\" (e.g., Scale 1-10; or mild,   moderate, severe)    - NONE (0): No pain    - MILD (1-3): Doesn't interfere with normal activities     - MODERATE (4-7): Interferes with normal activities or awakens from sleep     - SEVERE (8-10): Excruciating pain, unable to do any normal activities       moderate  7. SIZE: For cuts, bruises, or swelling, ask: \"How large is it?\" (e.g., inches or centimeters)         8. PREGNANCY: \"Is there any chance you are pregnant?\" \"When was your last menstrual period?\"      no  9. OTHER SYMPTOMS: \"Do you have any other symptoms?\" (e.g., dizziness, fever, weakness; new onset or worsening).       Swelling around incision  10. CAUSE: \"What do you think caused the fall (or falling)?\" (e.g., tripped, dizzy spell)         Just had neck fusion sx    Protocols used: Falls and Falling-ADULT-AH    "

## 2023-06-07 ENCOUNTER — READMISSION MANAGEMENT (OUTPATIENT)
Dept: CALL CENTER | Facility: HOSPITAL | Age: 73
End: 2023-06-07
Payer: MEDICARE

## 2023-06-07 NOTE — OUTREACH NOTE
General Surgery Week 1 Survey      Flowsheet Row Responses   [facility] patient discharged from? [place]   Does the patient have one of the following disease processes/diagnoses(primary or secondary)? General Surgery   Week 1 attempt successful? Yes   Call start time 1920   Call end time 1926   Discharge diagnosis CERVICAL DISCECTOMY ANTERIOR WITH FUSION C4-5, C5-6   Meds reviewed with patient/caregiver? Yes   Is the patient having any side effects they believe may be caused by any medication additions or changes? No   Does the patient have all medications related to this admission filled (includes all antibiotics, pain medications, etc.) Yes   Is the patient taking all medications as directed (includes completed medication regime)? Yes   Does the patient have a follow up appointment scheduled with their surgeon? Yes   Has the patient kept scheduled appointments due by today? N/A   Psychosocial issues? No   Did the patient receive a copy of their discharge instructions? Yes   Nursing interventions Reviewed instructions with patient   What is the patient's perception of their health status since discharge? Improving   Is the patient /caregiver able to teach back basic post-op care? Practice 'cough and deep breath', Drive as instructed by MD in discharge instructions, Keep incision areas clean,dry and protected, Lifting as instructed by MD in discharge instructions, Take showers only when approved by MD-sponge bathe until then   Is the patient/caregiver able to teach back signs and symptoms of incisional infection? Fever, Pus or odor from incision, Incisional warmth, Increased drainage or bleeding, Increased redness, swelling or pain at the incisonal site   Is the patient/caregiver able to teach back steps to recovery at home? Set small, achievable goals for return to baseline health, Rest and rebuild strength, gradually increase activity, Eat a well-balance diet, Make a list of questions for surgeon's  appointment   Is the patient/caregiver able to teach back the hierarchy of who to call/visit for symptoms/problems? PCP, Specialist, Home health nurse, Urgent Care, ED, 911 Yes   Additional teach back comments States he had to cut the pain medications in half.  States he slid out of his chair after taking a whole one.  He is also using ice to help with the pain.   Week 1 call completed? Yes   Graduated/Revoked comments Denies questions or needs at this time.            Kayla CHAN - Licensed Nurse

## 2023-06-16 ENCOUNTER — OFFICE VISIT (OUTPATIENT)
Dept: NEUROSURGERY | Facility: CLINIC | Age: 73
End: 2023-06-16
Payer: MEDICARE

## 2023-06-16 VITALS
TEMPERATURE: 97 F | WEIGHT: 223.2 LBS | SYSTOLIC BLOOD PRESSURE: 175 MMHG | BODY MASS INDEX: 28.64 KG/M2 | HEIGHT: 74 IN | DIASTOLIC BLOOD PRESSURE: 90 MMHG

## 2023-06-16 DIAGNOSIS — G95.9 CERVICAL MYELOPATHY: ICD-10-CM

## 2023-06-16 DIAGNOSIS — M47.12 CERVICAL SPONDYLOSIS WITH MYELOPATHY: Primary | ICD-10-CM

## 2023-06-16 DIAGNOSIS — R53.1 GENERALIZED WEAKNESS: ICD-10-CM

## 2023-06-16 PROCEDURE — 3080F DIAST BP >= 90 MM HG: CPT | Performed by: PHYSICIAN ASSISTANT

## 2023-06-16 PROCEDURE — 99024 POSTOP FOLLOW-UP VISIT: CPT | Performed by: PHYSICIAN ASSISTANT

## 2023-06-16 PROCEDURE — 3077F SYST BP >= 140 MM HG: CPT | Performed by: PHYSICIAN ASSISTANT

## 2023-06-16 RX ORDER — ACETAMINOPHEN AND CODEINE PHOSPHATE 300; 30 MG/1; MG/1
1 TABLET ORAL 2 TIMES DAILY PRN
Qty: 60 TABLET | Refills: 1 | Status: SHIPPED | OUTPATIENT
Start: 2023-06-16

## 2023-06-16 NOTE — PROGRESS NOTES
Panda Henry  1950  06/16/2023  [id number]    CC: 6/19/23    HPI:  S/P post op acdf C4-6, 6/1/23. His prior symptoms of numbness in the hands and feet have resolved. He is still cautious with his gait and balance.     Past Medical History:   Diagnosis Date    Arthritis     Asthma     Cancer     basal cell carcinoma    Cervical disc disorder 1988    COPD (chronic obstructive pulmonary disease)     Coronary artery disease     Depression     Diabetes mellitus     GERD (gastroesophageal reflux disease)     Headache     Hyperlipidemia     Hypertension     Injury of back     Low back pain     Lumbosacral disc disease     Neuropathy     Peripheral neuropathy     Pulmonary arterial hypertension     Spinal headache 1988    AFTER MYELOGRAM-PER PATIENT, HEADACHE FOR 9 NINE DAYS    Spinal stenosis     Thoracic disc disorder        Allergies   Allergen Reactions    Contrast Dye (Echo Or Unknown Ct/Mr) Headache     Had a headache for 9 days     Dust Mite Extract Headache    Grass Headache    Molds & Smuts Headache    Pollen Extract Headache         Current Outpatient Medications:     albuterol sulfate  (90 Base) MCG/ACT inhaler, Inhale 2 puffs Every 4 (Four) Hours As Needed for Wheezing., Disp: , Rfl:     aspirin 81 MG EC tablet, Take 1 tablet by mouth Daily., Disp: , Rfl:     budesonide-formoterol (SYMBICORT) 160-4.5 MCG/ACT inhaler, INHALE 2 PUFFS TWICE A DAY FOR 30 DAYS, Disp: 10.2 each, Rfl: 2    carboxymethylcellulose (REFRESH PLUS) 0.5 % solution, 3 (Three) Times a Day As Needed for Dry Eyes., Disp: , Rfl:     carvedilol (COREG) 25 MG tablet, Take 1 tablet by mouth 2 (Two) Times a Day With Meals., Disp: 180 tablet, Rfl: 3    clopidogrel (PLAVIX) 75 MG tablet, Take 1 tablet by mouth Daily., Disp: 90 tablet, Rfl: 3    diclofenac (VOLTAREN) 75 MG EC tablet, TAKE 1 TABLET BY MOUTH 2 TIMES A DAY FOR 30 DAYS., Disp: 60 tablet, Rfl: 0    doxepin (SINEquan) 100 MG capsule, Take 1 capsule by mouth every night at  bedtime., Disp: , Rfl:     Entresto 24-26 MG tablet, TAKE 1 TABLET BY MOUTH TWICE A DAY, Disp: 60 tablet, Rfl: 5    escitalopram (LEXAPRO) 20 MG tablet, TAKE 2 & 1/2 TABLETS BY MOUTH DAILY 90 DAYS, Disp: 225 tablet, Rfl: 0    ezetimibe (ZETIA) 10 MG tablet, Take 1 tablet by mouth Daily., Disp: , Rfl:     finasteride (PROSCAR) 5 MG tablet, Take 1 tablet by mouth Daily., Disp: , Rfl:     gabapentin (NEURONTIN) 400 MG capsule, Take 1 capsule by mouth 3 (Three) Times a Day., Disp: , Rfl:     glucose 4-6 GM-MG per chewable tablet, Chew 2 tablets As Needed for Low Blood Sugar., Disp: , Rfl:     insulin aspart (novoLOG FLEXPEN) 100 UNIT/ML solution pen-injector sc pen, Inject 14 Units under the skin into the appropriate area as directed 2 (Two) Times a Day With Meals. 16 units in the am, 14 units in the Pm, Disp: , Rfl:     insulin glargine (LANTUS, SEMGLEE) 100 UNIT/ML injection, Inject 54 Units under the skin into the appropriate area as directed Daily., Disp: , Rfl:     loratadine (CLARITIN) 10 MG tablet, Take 1 tablet by mouth Daily., Disp: , Rfl:     LORazepam (ATIVAN) 2 MG tablet, Take 1 tablet by mouth 2 (Two) Times a Day., Disp: , Rfl:     lovastatin (MEVACOR) 40 MG tablet, Take 1 tablet by mouth Every Night for 30 days., Disp: 30 tablet, Rfl: 2    montelukast (SINGULAIR) 10 MG tablet, Take 1 tablet by mouth Every Night., Disp: , Rfl:     nitroglycerin (NITROSTAT) 0.4 MG SL tablet, Place 1 tablet under the tongue Every 5 (Five) Minutes As Needed for Chest Pain. Take no more than 3 doses in 15 minutes., Disp: , Rfl:     omeprazole (priLOSEC) 40 MG capsule, TAKE 1 CAPSULE BY MOUTH EVERY DAY 30 MINUTES BEFORE MORNING MEAL, Disp: , Rfl:     salsalate (DISALCID) 750 MG tablet, Take 1 tablet by mouth 2 (Two) Times a Day., Disp: , Rfl:     tamsulosin (FLOMAX) 0.4 MG capsule 24 hr capsule, Take 1 capsule by mouth Daily., Disp: , Rfl:     Review of Systems   Constitutional:  Negative for activity change, appetite change,  chills, diaphoresis, fatigue, fever and unexpected weight change.   HENT:  Negative for congestion, dental problem, drooling, ear discharge, ear pain, facial swelling, hearing loss, mouth sores, nosebleeds, postnasal drip, rhinorrhea, sinus pressure, sneezing, sore throat, tinnitus, trouble swallowing and voice change.    Eyes:  Negative for photophobia, pain, discharge, redness, itching and visual disturbance.   Respiratory:  Negative for apnea, cough, choking, chest tightness, shortness of breath, wheezing and stridor.    Cardiovascular:  Negative for chest pain, palpitations and leg swelling.   Gastrointestinal:  Negative for abdominal distention, abdominal pain, anal bleeding, blood in stool, constipation, diarrhea, nausea, rectal pain and vomiting.   Endocrine: Negative for cold intolerance, heat intolerance, polydipsia, polyphagia and polyuria.   Genitourinary:  Negative for decreased urine volume, difficulty urinating, dysuria, enuresis, flank pain, frequency, genital sores, hematuria and urgency.   Musculoskeletal:  Negative for arthralgias, back pain, gait problem, joint swelling, myalgias, neck pain and neck stiffness.   Skin:  Negative for color change, pallor, rash and wound.   Allergic/Immunologic: Negative for environmental allergies, food allergies and immunocompromised state.   Neurological:  Negative for dizziness, tremors, seizures, syncope, facial asymmetry, speech difficulty, weakness, light-headedness, numbness and headaches.   Hematological:  Negative for adenopathy. Does not bruise/bleed easily.   Psychiatric/Behavioral:  Negative for agitation, behavioral problems, confusion, decreased concentration, dysphoric mood, hallucinations, self-injury, sleep disturbance and suicidal ideas. The patient is not nervous/anxious and is not hyperactive.    All other systems reviewed and are negative.      PE:  /90 (BP Location: Right arm, Patient Position: Sitting, Cuff Size: Adult)   Temp 97 °F  "(36.1 °C) (Infrared)   Ht 188 cm (74\")   Wt 101 kg (223 lb 3.2 oz)   BMI 28.66 kg/m²   Heart- RRR  Lungs- no wheezing, normal expansion    Wound-post    Neurologic Exam   Gait is steady and small stride.   Strength intact    MDM     Activities and restrictions were discussed.  Wound care was discussed with the patient.  Diagnoses and all orders for this visit:    1. Cervical myelopathy (Primary)  -     Ambulatory Referral to Physical Therapy Evaluate and treat; Stretching (Gait & Balance), ROM, Strengthening    2. Cervical spondylosis with myelopathy    3. Generalized weakness       Doing well after 2 level discectomy for stenosis and early myelopathy. He will attend PT. We will have a f/u in 8 weeks.    Any copied data from previous notes included in the (1) HPI, (2) PE, (3) MDM and/or Assessment and Plan has been reviewed and is accurate as of 6/19/23.    Ernestina Jones, PAC    "

## 2023-08-04 ENCOUNTER — OFFICE VISIT (OUTPATIENT)
Dept: FAMILY MEDICINE CLINIC | Facility: CLINIC | Age: 73
End: 2023-08-04
Payer: MEDICARE

## 2023-08-04 VITALS
BODY MASS INDEX: 28.75 KG/M2 | OXYGEN SATURATION: 97 % | DIASTOLIC BLOOD PRESSURE: 80 MMHG | SYSTOLIC BLOOD PRESSURE: 182 MMHG | HEIGHT: 74 IN | HEART RATE: 93 BPM | TEMPERATURE: 97.5 F | RESPIRATION RATE: 20 BRPM | WEIGHT: 224 LBS

## 2023-08-04 DIAGNOSIS — M54.50 ACUTE MIDLINE LOW BACK PAIN WITHOUT SCIATICA: ICD-10-CM

## 2023-08-04 DIAGNOSIS — N52.9 ERECTILE DYSFUNCTION, UNSPECIFIED ERECTILE DYSFUNCTION TYPE: ICD-10-CM

## 2023-08-04 DIAGNOSIS — N49.2 SCROTAL NODULE: Primary | ICD-10-CM

## 2023-08-04 RX ORDER — TADALAFIL 10 MG/1
10 TABLET ORAL DAILY PRN
Qty: 10 TABLET | Refills: 2 | Status: SHIPPED | OUTPATIENT
Start: 2023-08-04 | End: 2023-08-14

## 2023-08-04 RX ORDER — DICLOFENAC SODIUM 75 MG/1
75 TABLET, DELAYED RELEASE ORAL 2 TIMES DAILY
Qty: 60 TABLET | Refills: 0 | Status: SHIPPED | OUTPATIENT
Start: 2023-08-04 | End: 2023-09-03

## 2023-08-04 NOTE — PROGRESS NOTES
"Chief Complaint  lumps (In testicles - wants further screening/Prostate problems) and Back Pain (Lower back x 2 weeks)    Subjective        Panda Henry presents to [facility]  History of Present Illness  The patient is a 74 yo male who is here because of bilateral scrotal lumps that has been there for years, he had some blood in the [facility] but they did not do any ultrasound or other test. The patient has back pain for 2 weeks but he denies any recent injuries or direct trauma to the back. He also denies any radiation of the pain. He is also wanting a refill on the cialis that he has taken before.    Objective   Vital Signs:  BP (!) 182/80 (BP Location: Right arm, Patient Position: Sitting, Cuff Size: Adult)   Pulse 93   Temp 97.5 øF (36.4 øC) (Temporal)   Resp 20   Ht 188 cm (74\")   Wt 102 kg (224 lb)   SpO2 97%   BMI 28.76 kg/mý   Estimated body mass index is 28.76 kg/mý as calculated from the following:    Height as of this encounter: 188 cm (74\").    Weight as of this encounter: 102 kg (224 lb).               Physical Exam  Vitals and nursing note reviewed.   Constitutional:       General: He is not in acute distress.     Appearance: Normal appearance. He is well-developed and well-groomed.   HENT:      Head: Normocephalic and atraumatic.      Jaw: No tenderness or pain on movement.      Salivary Glands: Right salivary gland is not diffusely enlarged. Left salivary gland is not diffusely enlarged.      Right Ear: Tympanic membrane and ear canal normal.      Left Ear: Tympanic membrane and ear canal normal.      Nose: No congestion or rhinorrhea.      Mouth/Throat:      Mouth: Mucous membranes are moist.      Pharynx: No oropharyngeal exudate or posterior oropharyngeal erythema.   Eyes:      General: Lids are normal. No allergic shiner or scleral icterus.     Conjunctiva/sclera: Conjunctivae normal.      Pupils: Pupils are equal, round, and " reactive to light.   Neck:      Thyroid: No thyroid mass, thyromegaly or thyroid tenderness.      Trachea: Trachea normal.   Cardiovascular:      Rate and Rhythm: Normal rate and regular rhythm. No extrasystoles are present.     Pulses: Normal pulses.      Heart sounds: Normal heart sounds. No murmur heard.  Pulmonary:      Effort: Pulmonary effort is normal. No respiratory distress.      Breath sounds: Normal breath sounds. No decreased breath sounds, wheezing, rhonchi or rales.   Chest:   Breasts:     Right: Normal.      Left: Normal.   Abdominal:      General: Bowel sounds are normal.      Palpations: Abdomen is soft.      Tenderness: There is no abdominal tenderness. There is no right CVA tenderness, left CVA tenderness, guarding or rebound.   Genitourinary:     Testes:         Right: Mass (nodular) and tenderness (mild) present. Swelling not present.         Left: Mass (nodular) and tenderness (mild) present. Swelling not present.   Musculoskeletal:      Cervical back: Normal and neck supple.      Thoracic back: Normal.      Lumbar back: Tenderness (lumbar vertebral area) present. Negative right straight leg raise test and negative left straight leg raise test.      Right lower leg: No edema.      Left lower leg: No edema.   Lymphadenopathy:      Cervical: No cervical adenopathy.      Upper Body:      Right upper body: No supraclavicular or axillary adenopathy.      Left upper body: No supraclavicular or axillary adenopathy.   Skin:     General: Skin is warm.      Nails: There is no clubbing.   Neurological:      General: No focal deficit present.      Mental Status: He is alert and oriented to person, place, and time.      Sensory: Sensation is intact.      Motor: Motor function is intact.      Coordination: Coordination is intact.   Psychiatric:         Attention and Perception: Attention and perception normal.         Mood and Affect: Mood normal.         Speech: Speech normal.         Behavior: Behavior  normal. Behavior is cooperative.         Thought Content: Thought content normal.      Result Review :                   Assessment and Plan   Diagnoses and all orders for this visit:    1. Scrotal nodule (Primary)  Comments:  bilateral  Orders:  -     US Scrotum & Testicles; Future    2. Acute midline low back pain without sciatica  -     diclofenac (VOLTAREN) 75 MG EC tablet; Take 1 tablet by mouth 2 (Two) Times a Day for 60 doses.  Dispense: 60 tablet; Refill: 0    3. Erectile dysfunction, unspecified erectile dysfunction type  -     tadalafil (Cialis) 10 MG tablet; Take 1 tablet by mouth Daily As Needed for Erectile Dysfunction for up to 10 days.  Dispense: 10 tablet; Refill: 2           I spent 20 minutes caring for Panda on this date of service. This time includes time spent by me in the following activities:preparing for the visit, performing a medically appropriate examination and/or evaluation , counseling and educating the patient/family/caregiver, ordering medications, tests, or procedures, and documenting information in the medical record    Follow Up   Return if symptoms worsen or fail to improve.  Patient was given instructions and counseling regarding his condition or for health maintenance advice. Please see specific information pulled into the AVS if appropriate.     The patient is advised to continue all of his regular medications as prescribed. She was counseled regarding the importance of diet, exercise and medication compliance.    RTC or go to the ER if symptoms worsen or fail to improve.      This document has been electronically signed by Alberto Green MD  August 8, 2023 10:03 EDT

## 2023-08-11 RX ORDER — SACUBITRIL AND VALSARTAN 24; 26 MG/1; MG/1
TABLET, FILM COATED ORAL
Qty: 60 TABLET | Refills: 5 | Status: SHIPPED | OUTPATIENT
Start: 2023-08-11

## 2023-08-16 ENCOUNTER — OFFICE VISIT (OUTPATIENT)
Dept: NEUROSURGERY | Facility: CLINIC | Age: 73
End: 2023-08-16
Payer: MEDICARE

## 2023-08-16 VITALS — BODY MASS INDEX: 29.39 KG/M2 | HEIGHT: 74 IN | WEIGHT: 229 LBS | RESPIRATION RATE: 17 BRPM

## 2023-08-16 DIAGNOSIS — Z98.890 STATUS POST CERVICAL DISCECTOMY: Primary | ICD-10-CM

## 2023-08-16 PROCEDURE — 1159F MED LIST DOCD IN RCRD: CPT | Performed by: NEUROLOGICAL SURGERY

## 2023-08-16 PROCEDURE — 1160F RVW MEDS BY RX/DR IN RCRD: CPT | Performed by: NEUROLOGICAL SURGERY

## 2023-08-16 PROCEDURE — 99024 POSTOP FOLLOW-UP VISIT: CPT | Performed by: NEUROLOGICAL SURGERY

## 2023-08-16 NOTE — PROGRESS NOTES
Patient: Panda Henry  : 1950    Primary Care Provider: Alberto Green MD    Requesting Provider: As above        History    Chief Complaint: Neck and arm pain with sensory alteration and gait disturbance.    History of Present Illness: Mr. Henry is a 73 old gentleman who presented with the above-noted complaints and was noted to have a mild myelopathy.  As such on 2023 underwent ACDF C4-6.  His hand symptoms are better.  He has some mild neck discomfort.  His gait remains a bit unsteady.  He has done some physical therapy.  Overall he is pleased with his progress.    Review of Systems   Constitutional:  Negative for activity change, appetite change, chills, diaphoresis, fatigue, fever and unexpected weight change.   HENT:  Negative for congestion, dental problem, drooling, ear discharge, ear pain, facial swelling, hearing loss, mouth sores, nosebleeds, postnasal drip, rhinorrhea, sinus pressure, sneezing, sore throat, tinnitus, trouble swallowing and voice change.    Eyes:  Negative for photophobia, pain, discharge, redness, itching and visual disturbance.   Respiratory:  Negative for apnea, cough, choking, chest tightness, shortness of breath, wheezing and stridor.    Cardiovascular:  Negative for chest pain, palpitations and leg swelling.   Gastrointestinal:  Negative for abdominal distention, abdominal pain, anal bleeding, blood in stool, constipation, diarrhea, nausea, rectal pain and vomiting.   Endocrine: Negative for cold intolerance, heat intolerance, polydipsia, polyphagia and polyuria.   Genitourinary:  Negative for decreased urine volume, difficulty urinating, dysuria, enuresis, flank pain, frequency, genital sores, hematuria and urgency.   Musculoskeletal:  Positive for neck stiffness. Negative for arthralgias, back pain, gait problem, joint swelling, myalgias and neck pain.   Skin:  Negative for color change, pallor, rash and wound.   Allergic/Immunologic: Negative for  "environmental allergies, food allergies and immunocompromised state.   Neurological:  Negative for dizziness, tremors, seizures, syncope, facial asymmetry, speech difficulty, weakness, light-headedness, numbness and headaches.   Hematological:  Negative for adenopathy. Does not bruise/bleed easily.   Psychiatric/Behavioral:  Negative for agitation, behavioral problems, confusion, decreased concentration, dysphoric mood, hallucinations, self-injury, sleep disturbance and suicidal ideas. The patient is not nervous/anxious and is not hyperactive.    All other systems reviewed and are negative.    The patient's past medical history, past surgical history, family history, and social history have been reviewed at length in the electronic medical record.      Physical Exam:   Resp 17   Ht 188 cm (74\")   Wt 104 kg (229 lb)   BMI 29.40 kg/mý   Right anterior cervical incision looks good.  Plain films of the cervical    Medical Decision Making    Data Review:   (All imaging studies were personally reviewed unless stated otherwise)  Spine demonstrate good position of his construct from C4-6.    Diagnosis:   Cervical spondylosis with myelopathy status post ACDF.    Treatment Options:   The patient is doing well.  He will follow-up in our clinic in 6 months with new plain films of his cervical spine to assess his fusion construct.       Diagnosis Plan   1. Status post cervical discectomy            Scribed for Zana Olmos MD by Luisa Caballero CMA on 8/16/2023 14:58 EDT       I, Dr. Olmos, personally performed the services described in the documentation, as scribed in my presence, and it is both accurate and complete.  "

## 2023-08-18 ENCOUNTER — TELEPHONE (OUTPATIENT)
Dept: FAMILY MEDICINE CLINIC | Facility: CLINIC | Age: 73
End: 2023-08-18
Payer: MEDICARE

## 2023-09-07 DIAGNOSIS — J44.9 CHRONIC OBSTRUCTIVE PULMONARY DISEASE, UNSPECIFIED COPD TYPE: ICD-10-CM

## 2023-09-07 RX ORDER — BUDESONIDE AND FORMOTEROL FUMARATE DIHYDRATE 160; 4.5 UG/1; UG/1
AEROSOL RESPIRATORY (INHALATION)
Qty: 10.2 EACH | Refills: 2 | Status: SHIPPED | OUTPATIENT
Start: 2023-09-07

## 2023-09-12 ENCOUNTER — OFFICE VISIT (OUTPATIENT)
Dept: FAMILY MEDICINE CLINIC | Facility: CLINIC | Age: 73
End: 2023-09-12
Payer: MEDICARE

## 2023-09-12 VITALS
SYSTOLIC BLOOD PRESSURE: 148 MMHG | WEIGHT: 225 LBS | OXYGEN SATURATION: 98 % | BODY MASS INDEX: 28.88 KG/M2 | RESPIRATION RATE: 18 BRPM | HEART RATE: 71 BPM | DIASTOLIC BLOOD PRESSURE: 76 MMHG | HEIGHT: 74 IN

## 2023-09-12 DIAGNOSIS — J01.10 ACUTE NON-RECURRENT FRONTAL SINUSITIS: ICD-10-CM

## 2023-09-12 DIAGNOSIS — R09.81 NASAL CONGESTION: Primary | ICD-10-CM

## 2023-09-12 DIAGNOSIS — J01.00 ACUTE NON-RECURRENT MAXILLARY SINUSITIS: ICD-10-CM

## 2023-09-12 LAB
EXPIRATION DATE: NORMAL
FLUAV AG UPPER RESP QL IA.RAPID: NOT DETECTED
FLUBV AG UPPER RESP QL IA.RAPID: NOT DETECTED
INTERNAL CONTROL: NORMAL
Lab: NORMAL
SARS-COV-2 AG UPPER RESP QL IA.RAPID: NOT DETECTED

## 2023-09-12 PROCEDURE — 3077F SYST BP >= 140 MM HG: CPT | Performed by: FAMILY MEDICINE

## 2023-09-12 PROCEDURE — 99213 OFFICE O/P EST LOW 20 MIN: CPT | Performed by: FAMILY MEDICINE

## 2023-09-12 PROCEDURE — 3078F DIAST BP <80 MM HG: CPT | Performed by: FAMILY MEDICINE

## 2023-09-12 PROCEDURE — 87428 SARSCOV & INF VIR A&B AG IA: CPT | Performed by: FAMILY MEDICINE

## 2023-09-12 PROCEDURE — 3044F HG A1C LEVEL LT 7.0%: CPT | Performed by: FAMILY MEDICINE

## 2023-09-12 RX ORDER — AMOXICILLIN 875 MG/1
875 TABLET, COATED ORAL 2 TIMES DAILY
Qty: 20 TABLET | Refills: 0 | Status: SHIPPED | OUTPATIENT
Start: 2023-09-12 | End: 2023-09-22

## 2023-09-12 NOTE — PROGRESS NOTES
"Chief Complaint  Sinus Problem (States sinus infection causing blood when blowing nose./& Dizziness since yesterday - )    Subjective        Panda Henry presents to [facility]  History of Present Illness  The patient is a 74 yo male who is here because of congestion for 4 days. He has been taking claritin and flonase but it has not helped lately. Now he has blood with the nasal drainage. He denies any fever, chills, shortness of breath,headaches or dizziness.  Sinus Problem  This is a new problem. The current episode started in the past 7 days. The problem has been gradually worsening since onset. There has been no fever. Associated symptoms include congestion, coughing, headaches and sinus pressure. Pertinent negatives include no chills, shortness of breath or sore throat. Past treatments include oral decongestants and nasal decongestants. The treatment provided no relief.     Objective   Vital Signs:  /76   Pulse 71   Resp 18   Ht 188 cm (74\")   Wt 102 kg (225 lb)   SpO2 98%   BMI 28.89 kg/m²   Estimated body mass index is 28.89 kg/m² as calculated from the following:    Height as of this encounter: 188 cm (74\").    Weight as of this encounter: 102 kg (225 lb).               Physical Exam  Vitals and nursing note reviewed.   Constitutional:       General: He is not in acute distress.     Appearance: Normal appearance. He is well-developed and well-groomed.   HENT:      Head: Normocephalic and atraumatic.      Jaw: No tenderness or pain on movement.      Salivary Glands: Right salivary gland is not diffusely enlarged. Left salivary gland is not diffusely enlarged.      Right Ear: Tympanic membrane and ear canal normal.      Left Ear: Tympanic membrane and ear canal normal.      Nose: Congestion and rhinorrhea present.      Right Sinus: Maxillary sinus tenderness and frontal sinus tenderness present.      Left Sinus: Maxillary sinus tenderness and frontal sinus " tenderness present.      Mouth/Throat:      Mouth: Mucous membranes are moist.      Pharynx: No oropharyngeal exudate or posterior oropharyngeal erythema.   Eyes:      General: Lids are normal. No allergic shiner or scleral icterus.     Conjunctiva/sclera: Conjunctivae normal.      Pupils: Pupils are equal, round, and reactive to light.   Neck:      Thyroid: No thyroid mass, thyromegaly or thyroid tenderness.      Trachea: Trachea normal.   Cardiovascular:      Rate and Rhythm: Normal rate and regular rhythm. No extrasystoles are present.     Pulses: Normal pulses.      Heart sounds: Normal heart sounds. No murmur heard.  Pulmonary:      Effort: Pulmonary effort is normal. No respiratory distress.      Breath sounds: Normal breath sounds. No decreased breath sounds, wheezing, rhonchi or rales.   Chest:   Breasts:     Right: Normal.      Left: Normal.   Abdominal:      General: Bowel sounds are normal.      Palpations: Abdomen is soft.      Tenderness: There is no abdominal tenderness. There is no right CVA tenderness, left CVA tenderness, guarding or rebound.   Musculoskeletal:      Cervical back: Neck supple.      Right lower leg: No edema.      Left lower leg: No edema.   Lymphadenopathy:      Cervical: No cervical adenopathy.      Upper Body:      Right upper body: No supraclavicular or axillary adenopathy.      Left upper body: No supraclavicular or axillary adenopathy.   Skin:     General: Skin is warm.      Nails: There is no clubbing.   Neurological:      General: No focal deficit present.      Mental Status: He is alert and oriented to person, place, and time.      Sensory: Sensation is intact.      Motor: Motor function is intact.      Coordination: Coordination is intact.   Psychiatric:         Attention and Perception: Attention and perception normal.         Mood and Affect: Mood normal.         Speech: Speech normal.         Behavior: Behavior normal. Behavior is cooperative.         Thought Content:  Thought content normal.      Result Review :                   Assessment and Plan   Diagnoses and all orders for this visit:    1. Nasal congestion (Primary)  -     POCT SARS-CoV-2 Antigen PAVITHRA + Flu    2. Acute non-recurrent frontal sinusitis  -     amoxicillin (AMOXIL) 875 MG tablet; Take 1 tablet by mouth 2 (Two) Times a Day for 10 days.  Dispense: 20 tablet; Refill: 0    3. Acute non-recurrent maxillary sinusitis  -     amoxicillin (AMOXIL) 875 MG tablet; Take 1 tablet by mouth 2 (Two) Times a Day for 10 days.  Dispense: 20 tablet; Refill: 0           I spent 15 minutes caring for Panda on this date of service. This time includes time spent by me in the following activities:preparing for the visit, performing a medically appropriate examination and/or evaluation , counseling and educating the patient/family/caregiver, ordering medications, tests, or procedures, and documenting information in the medical record    Follow Up   No follow-ups on file.  Patient was given instructions and counseling regarding his condition or for health maintenance advice. Please see specific information pulled into the AVS if appropriate.     The patient is advised to continue all of his regular medications as prescribed. He was counseled regarding the importance of diet, exercise and medication compliance.    The patient was advised rest, increase oral fluids, may take tylenol or ibuprofen as needed. RTC as needed if symptoms worsen or fail to improve.    This document has been electronically signed by Alberto Green MD  September 12, 2023 13:28 EDT

## 2023-10-01 DIAGNOSIS — I25.10 CORONARY ARTERY DISEASE INVOLVING NATIVE CORONARY ARTERY OF NATIVE HEART WITHOUT ANGINA PECTORIS: ICD-10-CM

## 2023-10-02 RX ORDER — CLOPIDOGREL BISULFATE 75 MG/1
TABLET ORAL
Qty: 90 TABLET | Refills: 3 | Status: SHIPPED | OUTPATIENT
Start: 2023-10-02

## 2024-01-29 ENCOUNTER — TELEPHONE (OUTPATIENT)
Dept: CARDIOLOGY | Facility: CLINIC | Age: 74
End: 2024-01-29
Payer: MEDICARE

## 2024-01-29 NOTE — TELEPHONE ENCOUNTER
Caller: ZELDA EVERETT    Relationship to patient: Emergency Contact    Best call back number: [phone]    Chief complaint: PT'S BP IS JUMPING AROUND WITH READINGS /94, 181/83, /59    Type of visit: FOLLOW UP    Requested date: ASAP     Additional notes: PT SEE'S DR. MISHRA IN [facility]

## 2024-01-30 ENCOUNTER — OFFICE VISIT (OUTPATIENT)
Dept: CARDIOLOGY | Facility: CLINIC | Age: 74
End: 2024-01-30
Payer: MEDICARE

## 2024-01-30 VITALS
OXYGEN SATURATION: 98 % | SYSTOLIC BLOOD PRESSURE: 152 MMHG | HEART RATE: 72 BPM | BODY MASS INDEX: 29.77 KG/M2 | DIASTOLIC BLOOD PRESSURE: 80 MMHG | WEIGHT: 232 LBS | HEIGHT: 74 IN

## 2024-01-30 DIAGNOSIS — I10 PRIMARY HYPERTENSION: ICD-10-CM

## 2024-01-30 DIAGNOSIS — I25.10 CORONARY ARTERY DISEASE INVOLVING NATIVE CORONARY ARTERY OF NATIVE HEART WITHOUT ANGINA PECTORIS: Primary | ICD-10-CM

## 2024-01-30 DIAGNOSIS — E78.2 MIXED HYPERLIPIDEMIA: ICD-10-CM

## 2024-01-30 RX ORDER — AMLODIPINE BESYLATE 5 MG/1
5 TABLET ORAL DAILY
Qty: 30 TABLET | Refills: 11 | Status: SHIPPED | OUTPATIENT
Start: 2024-01-30

## 2024-01-30 RX ORDER — HYDROXYZINE HYDROCHLORIDE 25 MG/1
25 TABLET, FILM COATED ORAL ONCE AS NEEDED
COMMUNITY

## 2024-01-30 RX ORDER — HYDRALAZINE HYDROCHLORIDE 25 MG/1
25 TABLET, FILM COATED ORAL 3 TIMES DAILY
COMMUNITY
End: 2024-01-30 | Stop reason: SDUPTHER

## 2024-01-30 RX ORDER — HYDRALAZINE HYDROCHLORIDE 25 MG/1
25 TABLET, FILM COATED ORAL 3 TIMES DAILY
Qty: 270 TABLET | Refills: 3 | Status: SHIPPED | OUTPATIENT
Start: 2024-01-30

## 2024-01-30 RX ORDER — AMLODIPINE BESYLATE 5 MG/1
5 TABLET ORAL DAILY
Qty: 30 TABLET | Refills: 11 | Status: SHIPPED | OUTPATIENT
Start: 2024-01-30 | End: 2024-01-30 | Stop reason: SDUPTHER

## 2024-01-30 NOTE — PROGRESS NOTES
[facility] Cardiology    Encounter Date: 2024    Patient ID: Panda Henry is a 73 y.o. male.  : 1950     PCP: Alberto Green MD       Chief Complaint: Coronary Artery Disease      PROBLEM LIST:  Coronary artery disease  Abnormal stress test followed by left heart catheterization with stent placement to the LAD 2019-data deficit ([facility])  CCS class I-II atypical chest discomfort/NYHA class I-II dyspnea on exertion symptoms  Cardiac PET 8/3/2020: Acceptable negative IV Lexiscan hybrid PET cardiac CT stress scan studies suggestive of low probability for significant focal obstructive CAD with preserved systolic LV function (LVEF 0.54)  Echo, 2022; EF 56-60%. Mild concentric LVH. Grade I diastolic dysfunction with high LAP. RA cavity is mildly dilated.  Lexiscan, 10/25/2022; EF 50%. Scintigraphy demonstrates a fixed inferior wall defect compatible with infarct or diaphragmatic attenuation. Additionally there is a moderately sized, moderately dense but incompletely reversible inferolateral wall defect compatible with ischemia. Elevated transient ischemic dilation ratio 1.26 possibly representing multivessel coronary disease. No evidence of increased lung uptake of radiopharmaceutical.  LHC, 2022; EF 65%. 75 to 80% stenosis of the ostial/proximal LAD with abnormal IFR.  This vessel is now status post successful stenting with 3.5 x 28 mm JOSE E reducing the stenosis to no significant residual disease and improvement of IFR. Patent stent of mid LAD with nonobstructive 20 to 30% plaque. 90% stenosis of the ostial/proximal ramus intermedius.  This vessel is now status post JOSE E with 2.25 x 28 mm JOSE E reducing the stenosis to 0%. No significant disease of the codominant left circumflex coronary artery and the codominant right coronary artery.   30 day MCOT, 2022; SR. Rare APCs and PVCs. No significant abnormalities.   Lower extremity  weakness  Hypertension  Hyperlipidemia  Type 2 diabetes mellitus; hemoglobin A1c 7.9% August 2020  GERD  Obstructive sleep apnea, compliant with CPAP  Orthostatic dizziness  Mild obesity: BMI 30.37  Depression  COPD  Chronic back pain  Smokeless tobacco use; 1 stick every 3 days  History of basal cell carcinoma  History of rheumatic fever  Surgical history:  Blepharoplasty bilateral  LHC  Right rotator cuff repair  Sinus plasty  Basal cell carcinoma skin excision    History of Present Illness  Patient presents today for a follow-up with a history of CAD and cardiac risk factors. Since last visit, patient has been having issues with his BP. He has had readings as high as 200 at home . He has an associated headache with this. He did have an ER visit with high blood pressure but they did discharge him home without medication adjustment or any IV medications.    He also had an episode of syncope approximately 3 months ago. He states that he did check his BP and his BP had dropped. He has had no recurrent dizziness or syncope since then. He denies any chest pain or pressure, progressive shortness of breath, palpitations, orthopnea, and edema. He is having dizziness every morning when he wakes up and stands up. If he can standup for a few minutes, then the dizziness resolved. Sometimes he is dizzy all day. He states the dizziness gets worse when he turns his head. He has had a sinus headache for some time. He has not seen his PCP or an ENT for this. He denies chest pain, shortness of breath, palpitations, orthopnea, and edema.    Allergies   Allergen Reactions    Contrast Dye (Echo Or Unknown Ct/Mr) Headache     Had a headache for 9 days     Dust Mite Extract Headache    Grass Headache    Molds & Smuts Headache    Pollen Extract Headache         Current Outpatient Medications:     acetaminophen-codeine (TYLENOL/CODEINE #3) 300-30 MG per tablet, Take 1 tablet by mouth 2 (Two) Times a Day As Needed for Moderate Pain., Disp:  60 tablet, Rfl: 1    albuterol sulfate  (90 Base) MCG/ACT inhaler, Inhale 2 puffs Every 4 (Four) Hours As Needed for Wheezing., Disp: , Rfl:     aspirin 81 MG EC tablet, Take 1 tablet by mouth Daily., Disp: , Rfl:     budesonide-formoterol (SYMBICORT) 160-4.5 MCG/ACT inhaler, INHALE 2 PUFFS TWICE A DAY FOR 30 DAYS, Disp: 10.2 each, Rfl: 2    carboxymethylcellulose (REFRESH PLUS) 0.5 % solution, 3 (Three) Times a Day As Needed for Dry Eyes., Disp: , Rfl:     carvedilol (COREG) 25 MG tablet, Take 1 tablet by mouth 2 (Two) Times a Day With Meals., Disp: 180 tablet, Rfl: 3    clopidogrel (PLAVIX) 75 MG tablet, TAKE 1 TABLET BY MOUTH EVERY DAY, Disp: 90 tablet, Rfl: 3    doxepin (SINEquan) 100 MG capsule, Take 1 capsule by mouth every night at bedtime., Disp: , Rfl:     escitalopram (LEXAPRO) 20 MG tablet, TAKE 2 & 1/2 TABLETS BY MOUTH DAILY 90 DAYS, Disp: 225 tablet, Rfl: 0    ezetimibe (ZETIA) 10 MG tablet, Take 1 tablet by mouth Daily., Disp: , Rfl:     gabapentin (NEURONTIN) 400 MG capsule, Take 1 capsule by mouth 3 (Three) Times a Day., Disp: , Rfl:     glucose 4-6 GM-MG per chewable tablet, Chew 2 tablets As Needed for Low Blood Sugar., Disp: , Rfl:     hydrOXYzine (ATARAX) 25 MG tablet, Take 1 tablet by mouth Daily., Disp: , Rfl:     insulin aspart (novoLOG FLEXPEN) 100 UNIT/ML solution pen-injector sc pen, Inject 14 Units under the skin into the appropriate area as directed 2 (Two) Times a Day With Meals. 16 units in the am, 14 units in the Pm, Disp: , Rfl:     insulin glargine (LANTUS, SEMGLEE) 100 UNIT/ML injection, Inject 34 Units under the skin into the appropriate area as directed Daily. Recently changed to 34u in AM, Disp: , Rfl:     loratadine (CLARITIN) 10 MG tablet, Take 1 tablet by mouth Daily., Disp: , Rfl:     LORazepam (ATIVAN) 2 MG tablet, Take 1 tablet by mouth 2 (Two) Times a Day., Disp: , Rfl:     lovastatin (MEVACOR) 40 MG tablet, Take 1 tablet by mouth Every Night for 30 days., Disp: 30  "tablet, Rfl: 2    montelukast (SINGULAIR) 10 MG tablet, Take 1 tablet by mouth Every Night., Disp: , Rfl:     nitroglycerin (NITROSTAT) 0.4 MG SL tablet, Place 1 tablet under the tongue Every 5 (Five) Minutes As Needed for Chest Pain. Take no more than 3 doses in 15 minutes., Disp: , Rfl:     omeprazole (priLOSEC) 40 MG capsule, TAKE 1 CAPSULE BY MOUTH EVERY DAY 30 MINUTES BEFORE MORNING MEAL, Disp: , Rfl:     sacubitril-valsartan (Entresto) 24-26 MG tablet, TAKE 1 TABLET TWICE DAILY, Disp: 60 tablet, Rfl: 5    salsalate (DISALCID) 750 MG tablet, Take 1 tablet by mouth 2 (Two) Times a Day., Disp: , Rfl:     tamsulosin (FLOMAX) 0.4 MG capsule 24 hr capsule, Take 1 capsule by mouth Daily., Disp: , Rfl:     tadalafil (Cialis) 10 MG tablet, Take 1 tablet by mouth Daily As Needed for Erectile Dysfunction for up to 10 days., Disp: 10 tablet, Rfl: 2    The following portions of the patient's history were reviewed and updated as appropriate: allergies, current medications, past family history, past medical history, past social history, past surgical history and problem list.    ROS  Review of Systems   14 point ROS negative except for that listed in the HPI.         Objective:     /80 (BP Location: Left arm, Patient Position: Sitting)   Pulse 72   Ht 188 cm (74\")   Wt 105 kg (232 lb)   SpO2 98%   BMI 29.79 kg/m²      Physical Exam  Constitutional: Patient appears well-developed and well-nourished.   HENT: HEENT exam unremarkable.   Neck: Neck supple. No JVD present.   Cardiovascular: Normal rate, regular rhythm and normal heart sounds. No murmur heard.   2+ symmetric pulses.   Pulmonary/Chest: Breath sounds normal. Does not exhibit tenderness.   Abdominal: Abdomen benign.   Musculoskeletal: Does not exhibit edema.   Neurological: Neurological exam unremarkable.   Vitals reviewed.    Data Review:   Lab Results   Component Value Date    GLUCOSE 178 (H) 05/24/2023    BUN 9 05/24/2023    CREATININE 0.83 05/24/2023    " EGFR 92.4 05/24/2023    BCR 10.8 05/24/2023     05/24/2023    K 4.0 05/24/2023    CO2 28.0 05/24/2023    CALCIUM 8.8 05/24/2023    ALBUMIN 4.4 05/24/2023    AST 16 05/24/2023    ALT 19 05/24/2023     Lab Results   Component Value Date    WBC 5.63 05/24/2023    RBC 5.13 05/24/2023    HGB 15.2 05/24/2023    HCT 45.2 05/24/2023    MCV 88.1 05/24/2023     05/24/2023     Lab Results   Component Value Date    HGBA1C 6.9 02/17/2023        Procedures              Assessment:      Diagnosis   1. Coronary artery disease involving native coronary artery of native heart without angina pectoris       2. Primary hypertension       3. Mixed hyperlipidemia         Plan:   Stable cardiac status. No angina or CHF symptoms. Continue aspirin, plavix and statin.   Add amlodipine 5mg nightly for better blood pressure control.   For orthostatic symptoms, increase hydration, wear compression stockings, and change positions slowly. Patient verbalizes understanding.   Call our office in 1 week with updated BP log. Further recommendations to follow.   Recommend ENT for evaluation of sinusitis with symptoms consistent with vertigo.  Continue current medications.   FU in 3 MO, sooner as needed.  Thank you for allowing us to participate in the care of your patient.       Symone Jacobo PA-C      Please note that portions of this note may have been completed with a voice recognition program. Efforts were made to edit the dictations, but occasionally words are mistranscribed.

## 2024-02-01 PROBLEM — I95.1 AUTONOMIC ORTHOSTATIC HYPOTENSION: Status: ACTIVE | Noted: 2024-02-01

## 2024-02-20 ENCOUNTER — TELEPHONE (OUTPATIENT)
Dept: CARDIOLOGY | Facility: CLINIC | Age: 74
End: 2024-02-20
Payer: MEDICARE

## 2024-02-22 NOTE — TELEPHONE ENCOUNTER
Caller: Panda Henry     Relationship: SELF    Best call back number: [phone]    What is your medical concern? PT BLOOD PRESSURE HAS BEEN RUNNING HIGH. WITH READINGS RANGING FROM  119/63 FOR THE LOWEST  -  192/93 AS THE HIGHEST. IT IS STAYING MORE TOWARDS THE HIGHER NUMBER. THIS HAS EVEN CAUSED HIM TO PASS OUT. HIS [facility] IN [place] THINKS HE NEEDS SOME MORE TESTING DONE. PLEASE REACH OUT TO PT TO ADDRESS THESE CONCERNS.    How long has this issue been going on? SEVERAL MONTHS NOW    Is your provider already aware of this issue? YES    Have you been treated for this issue? YES      
Called patient. LVM  
It was just seen in the office last month.  However the symptoms sound progressive compared to the symptoms he was having at his visit.  Please schedule him for another office visit.  
Patient report having high BP and spells where he gets severely dizzy/lightheaded upon standing. He states he has passed out/lost consciousness on several occasions. This has happened less since his stent in 2022. Patient states his BP has been 160-190s over the past couple weeks. He read his most recent readings from past couple days and this is correct. He states he takes his BP in the evenings after he takes his medications.     He states he is taking hydralazine 25 bid and then 12.5 for his last dose. He states when he takes the 25 as his last dose he feels much more dizzy. He confirms he is taking all other medications as prescribed.     He states he is slightly SOB. Denies swelling and chest pain.     Recommended he record his BP in the AM 1-2 hours after taking his morning meds for one week and report back. I reiterated interventions for orthostatic symptoms.     Please advise! Thanks,   Renae VALERO  
Will schedule next available  
Alyse Gregory(Attending)

## 2024-02-26 PROBLEM — E78.5 DYSLIPIDEMIA: Status: ACTIVE | Noted: 2024-02-26

## 2024-02-26 NOTE — PROGRESS NOTES
[facility] Cardiology    Encounter Date: 2024    Patient ID: Panda Henry is a 73 y.o. male.  : 1950     PCP: Alberto Green MD       Chief Complaint: Coronary Artery Disease, Shortness of Breath, Dizziness, and Hypertension      PROBLEM LIST:  Coronary artery disease  Abnormal stress test followed by left heart catheterization with stent placement to the LAD 2019-data deficit ([facility])  CCS class I-II atypical chest discomfort/NYHA class I-II dyspnea on exertion symptoms  Cardiac PET 8/3/2020: Acceptable negative IV Lexiscan hybrid PET cardiac CT stress scan studies suggestive of low probability for significant focal obstructive CAD with preserved systolic LV function (LVEF 0.54)  Echo, 2022; EF 56-60%. Mild concentric LVH. Grade I diastolic dysfunction with high LAP. RA cavity is mildly dilated.  Lexiscan, 10/25/2022; EF 50%. Scintigraphy demonstrates a fixed inferior wall defect compatible with infarct or diaphragmatic attenuation. Additionally there is a moderately sized, moderately dense but incompletely reversible inferolateral wall defect compatible with ischemia. Elevated transient ischemic dilation ratio 1.26 possibly representing multivessel coronary disease. No evidence of increased lung uptake of radiopharmaceutical.  [facility], 2022; EF 65%. 75 to 80% stenosis of the ostial/proximal LAD with abnormal IFR.  This vessel is now status post successful stenting with 3.5 x 28 mm JOSE E reducing the stenosis to no significant residual disease and improvement of IFR. Patent stent of mid LAD with nonobstructive 20 to 30% plaque. 90% stenosis of the ostial/proximal ramus intermedius.  This vessel is now status post JOSE E with 2.25 x 28 mm JOSE E reducing the stenosis to 0%. No significant disease of the codominant left circumflex coronary artery and the codominant right coronary artery.   30 day MCOT, 2022; SR. Rare APCs and PVCs. No  significant abnormalities.   Lower extremity weakness  Hypertension  Hyperlipidemia  Type 2 diabetes mellitus; hemoglobin A1c 7.9% August 2020  GERD  Obstructive sleep apnea, compliant with CPAP  Orthostatic dizziness  Mild obesity: BMI 30.37  Depression  COPD  Chronic back pain  Smokeless tobacco use; 1 stick every 3 days  History of basal cell carcinoma  History of rheumatic fever  Surgical history:  Blepharoplasty bilateral  LHC  Right rotator cuff repair  Sinus plasty  Basal cell carcinoma skin excision    History of Present Illness  Patient presents today for a follow-up with a history of CAD and cardiac risk factors. Since last visit, patient has been doing well overall from a cardiovascular standpoint. However, he has noticed that his blood pressure has been fluctuating. Patient monitors hs blood pressure regularly and presents a sitting and standing log that shows pressures regularly between 160-190 mmHg systolic. He notes that his blood pressure is 20 to 30 points lower while sitting. Patient states that he is frequently short of breath and dizzy. He denies chest pain, orthopnea, palpitations, edema, and syncope.     Allergies   Allergen Reactions    Contrast Dye (Echo Or Unknown Ct/Mr) Headache     Had a headache for 9 days     Dust Mite Extract Headache    Grass Headache    Molds & Smuts Headache    Pollen Extract Headache         Current Outpatient Medications:     albuterol sulfate  (90 Base) MCG/ACT inhaler, Inhale 2 puffs Every 4 (Four) Hours As Needed for Wheezing., Disp: , Rfl:     amLODIPine (NORVASC) 5 MG tablet, Take 1 tablet by mouth Daily., Disp: 30 tablet, Rfl: 11    aspirin 81 MG EC tablet, Take 1 tablet by mouth Daily., Disp: , Rfl:     budesonide-formoterol (SYMBICORT) 160-4.5 MCG/ACT inhaler, INHALE 2 PUFFS TWICE A DAY FOR 30 DAYS, Disp: 10.2 each, Rfl: 2    carboxymethylcellulose (REFRESH PLUS) 0.5 % solution, 3 (Three) Times a Day As Needed for Dry Eyes., Disp: , Rfl:      carvedilol (COREG) 25 MG tablet, Take 1 tablet by mouth 2 (Two) Times a Day With Meals., Disp: 180 tablet, Rfl: 3    clopidogrel (PLAVIX) 75 MG tablet, TAKE 1 TABLET BY MOUTH EVERY DAY, Disp: 90 tablet, Rfl: 3    doxepin (SINEquan) 100 MG capsule, Take 1 capsule by mouth every night at bedtime., Disp: , Rfl:     escitalopram (LEXAPRO) 20 MG tablet, TAKE 2 & 1/2 TABLETS BY MOUTH DAILY 90 DAYS, Disp: 225 tablet, Rfl: 0    ezetimibe (ZETIA) 10 MG tablet, Take 1 tablet by mouth Daily., Disp: , Rfl:     gabapentin (NEURONTIN) 400 MG capsule, Take 1 capsule by mouth 3 (Three) Times a Day., Disp: , Rfl:     glucose 4-6 GM-MG per chewable tablet, Chew 2 tablets As Needed for Low Blood Sugar., Disp: , Rfl:     hydrALAZINE (APRESOLINE) 25 MG tablet, Take 1 tablet by mouth 3 (Three) Times a Day., Disp: 270 tablet, Rfl: 3    hydrOXYzine (ATARAX) 25 MG tablet, Take 1 tablet by mouth 1 (One) Time As Needed for Anxiety., Disp: , Rfl:     insulin aspart (novoLOG FLEXPEN) 100 UNIT/ML solution pen-injector sc pen, Inject 14 Units under the skin into the appropriate area as directed 2 (Two) Times a Day With Meals. 16 units in the am, 14 units in the Pm, Disp: , Rfl:     insulin glargine (LANTUS, SEMGLEE) 100 UNIT/ML injection, Inject 34 Units under the skin into the appropriate area as directed Daily. Recently changed to 34u in AM, Disp: , Rfl:     loratadine (CLARITIN) 10 MG tablet, Take 1 tablet by mouth Daily., Disp: , Rfl:     LORazepam (ATIVAN) 2 MG tablet, Take 1 tablet by mouth 2 (Two) Times a Day., Disp: , Rfl:     lovastatin (MEVACOR) 40 MG tablet, Take 1 tablet by mouth Every Night for 30 days., Disp: 30 tablet, Rfl: 2    montelukast (SINGULAIR) 10 MG tablet, Take 1 tablet by mouth Every Night., Disp: , Rfl:     nitroglycerin (NITROSTAT) 0.4 MG SL tablet, Place 1 tablet under the tongue Every 5 (Five) Minutes As Needed for Chest Pain. Take no more than 3 doses in 15 minutes., Disp: , Rfl:     omeprazole (priLOSEC) 40 MG  "capsule, TAKE 1 CAPSULE BY MOUTH EVERY DAY 30 MINUTES BEFORE MORNING MEAL, Disp: , Rfl:     sacubitril-valsartan (Entresto) 24-26 MG tablet, TAKE 1 TABLET TWICE DAILY, Disp: 60 tablet, Rfl: 5    salsalate (DISALCID) 750 MG tablet, Take 1 tablet by mouth 2 (Two) Times a Day., Disp: , Rfl:     tamsulosin (FLOMAX) 0.4 MG capsule 24 hr capsule, Take 1 capsule by mouth Daily., Disp: , Rfl:     tadalafil (Cialis) 10 MG tablet, Take 1 tablet by mouth Daily As Needed for Erectile Dysfunction for up to 10 days., Disp: 10 tablet, Rfl: 2    The following portions of the patient's history were reviewed and updated as appropriate: allergies, current medications, past family history, past medical history, past social history, past surgical history and problem list.    ROS  Review of Systems   14 point ROS negative except for that listed in the HPI.         Objective:     /86 (BP Location: Right arm, Patient Position: Sitting)   Pulse 67   Ht 189.2 cm (74.5\")   Wt 106 kg (233 lb 3.2 oz)   SpO2 96%   BMI 29.54 kg/m²    176/80 mmHg during visit.    Physical Exam  Constitutional: Patient appears well-developed and well-nourished.   HENT: HEENT exam unremarkable.   Neck: Neck supple. No JVD present. No carotid bruits.   Cardiovascular: Normal rate, regular rhythm and normal heart sounds. No murmur heard.   2+ symmetric pulses.   Pulmonary/Chest: Breath sounds normal. Does not exhibit tenderness.   Abdominal: Abdomen benign.   Musculoskeletal: Does not exhibit edema.   Neurological: Neurological exam unremarkable.   Vitals reviewed.    Data Review:   Lab Results   Component Value Date    GLUCOSE 178 (H) 05/24/2023    BUN 9 05/24/2023    CREATININE 0.83 05/24/2023    EGFR 92.4 05/24/2023    BCR 10.8 05/24/2023     05/24/2023    K 4.0 05/24/2023    CO2 28.0 05/24/2023    CALCIUM 8.8 05/24/2023    ALBUMIN 4.4 05/24/2023    AST 16 05/24/2023    ALT 19 05/24/2023     Lab Results   Component Value Date    WBC 5.63 05/24/2023 "    RBC 5.13 05/24/2023    HGB 15.2 05/24/2023    HCT 45.2 05/24/2023    MCV 88.1 05/24/2023     05/24/2023     Lab Results   Component Value Date    HGBA1C 6.9 02/17/2023        Procedures       Advance Care Planning   ACP discussion was held with the patient during this visit. Patient has an advance directive in EMR which is still valid.            Assessment:      Diagnosis Plan   1. Coronary artery disease involving native coronary artery of native heart without angina pectoris  Stable without angina on current activity. Continue on aspirin 81 mg for antiplatelet therapy. Continue on Plavix 75 mg daily for antiplatelet therapy. Continue beta-blockers and statin for GDMT and on nitroglycerin 0.4 mg PRN for chest pain.      2. Essential hypertension  Uncontrolled.  Due to edema discontinue amlodipine 5 mg daily. Increase Entresto to 49-51 mg BID for better hypertensive control. Compression stockings ordered for management of ankle edema.  Continue on carvedilol 25 mg BID for hypertension.      3. Dyslipidemia  No labs for review. Continue on Zetia 10 mg daily for hyperlipidemia. Continue on lovastatin 40 mg daily for hyperlipidemia.         Plan:   Stable cardiac status.  No current angina or CHF symptoms  Echocardiogram ordered to assess LV function and valvular function.  Discontinue amlodipine and use compression stockings for management of lower extremity edema.  Increase Entresto to 49-51 mg BID for better hypertensive control.  Continue rest of the current medications.   FU in 3 MO, sooner as needed.  Thank you for allowing us to participate in the care of your patient.     Scribed for Moisés Bansal MD by Carly Vaughan. 2/27/2024 09:03 EST    I, Moisés Bansal MD, personally performed the services described in this documentation as scribed by the above named individual in my presence, and it is both accurate and complete.  3/8/2024  06:25 EST      Please note that portions of this note may have been  completed with a voice recognition program. Efforts were made to edit the dictations, but occasionally words are mistranscribed.

## 2024-02-27 ENCOUNTER — TELEPHONE (OUTPATIENT)
Dept: FAMILY MEDICINE CLINIC | Facility: CLINIC | Age: 74
End: 2024-02-27

## 2024-02-27 ENCOUNTER — OFFICE VISIT (OUTPATIENT)
Dept: CARDIOLOGY | Facility: CLINIC | Age: 74
End: 2024-02-27
Payer: MEDICARE

## 2024-02-27 ENCOUNTER — OFFICE VISIT (OUTPATIENT)
Dept: FAMILY MEDICINE CLINIC | Facility: CLINIC | Age: 74
End: 2024-02-27
Payer: MEDICARE

## 2024-02-27 VITALS
OXYGEN SATURATION: 96 % | BODY MASS INDEX: 29 KG/M2 | HEART RATE: 67 BPM | SYSTOLIC BLOOD PRESSURE: 142 MMHG | DIASTOLIC BLOOD PRESSURE: 86 MMHG | HEIGHT: 75 IN | WEIGHT: 233.2 LBS

## 2024-02-27 VITALS
TEMPERATURE: 98.2 F | OXYGEN SATURATION: 98 % | SYSTOLIC BLOOD PRESSURE: 178 MMHG | BODY MASS INDEX: 28.97 KG/M2 | DIASTOLIC BLOOD PRESSURE: 62 MMHG | HEART RATE: 81 BPM | WEIGHT: 233 LBS | HEIGHT: 75 IN | RESPIRATION RATE: 18 BRPM

## 2024-02-27 DIAGNOSIS — J01.10 ACUTE NON-RECURRENT FRONTAL SINUSITIS: ICD-10-CM

## 2024-02-27 DIAGNOSIS — R05.9 COUGH, UNSPECIFIED TYPE: Primary | ICD-10-CM

## 2024-02-27 DIAGNOSIS — E78.5 DYSLIPIDEMIA: ICD-10-CM

## 2024-02-27 DIAGNOSIS — J01.00 ACUTE NON-RECURRENT MAXILLARY SINUSITIS: ICD-10-CM

## 2024-02-27 DIAGNOSIS — I95.1 AUTONOMIC ORTHOSTATIC HYPOTENSION: ICD-10-CM

## 2024-02-27 DIAGNOSIS — R55 NEAR SYNCOPE: ICD-10-CM

## 2024-02-27 DIAGNOSIS — I10 ESSENTIAL HYPERTENSION: ICD-10-CM

## 2024-02-27 DIAGNOSIS — R53.1 GENERALIZED WEAKNESS: ICD-10-CM

## 2024-02-27 DIAGNOSIS — I25.10 CORONARY ARTERY DISEASE INVOLVING NATIVE CORONARY ARTERY OF NATIVE HEART WITHOUT ANGINA PECTORIS: Primary | ICD-10-CM

## 2024-02-27 PROCEDURE — 99213 OFFICE O/P EST LOW 20 MIN: CPT | Performed by: FAMILY MEDICINE

## 2024-02-27 PROCEDURE — 3078F DIAST BP <80 MM HG: CPT | Performed by: FAMILY MEDICINE

## 2024-02-27 PROCEDURE — 3077F SYST BP >= 140 MM HG: CPT | Performed by: FAMILY MEDICINE

## 2024-02-27 PROCEDURE — 87428 SARSCOV & INF VIR A&B AG IA: CPT | Performed by: FAMILY MEDICINE

## 2024-02-27 RX ORDER — AMOXICILLIN 875 MG/1
875 TABLET, COATED ORAL 2 TIMES DAILY
Qty: 20 TABLET | Refills: 0 | Status: SHIPPED | OUTPATIENT
Start: 2024-02-27 | End: 2024-03-08

## 2024-02-27 RX ORDER — AMOXICILLIN 875 MG/1
875 TABLET, COATED ORAL 2 TIMES DAILY
Qty: 20 TABLET | Refills: 0 | Status: SHIPPED | OUTPATIENT
Start: 2024-02-27 | End: 2024-02-27 | Stop reason: SDUPTHER

## 2024-02-27 RX ORDER — GUAIFENESIN/DEXTROMETHORPHAN 100-10MG/5
10 SYRUP ORAL 3 TIMES DAILY PRN
Qty: 300 ML | Refills: 0 | Status: SHIPPED | OUTPATIENT
Start: 2024-02-27 | End: 2024-03-08

## 2024-02-27 RX ORDER — SACUBITRIL AND VALSARTAN 49; 51 MG/1; MG/1
1 TABLET, FILM COATED ORAL 2 TIMES DAILY
Qty: 180 TABLET | Refills: 3 | Status: SHIPPED | OUTPATIENT
Start: 2024-02-27

## 2024-02-27 RX ORDER — GUAIFENESIN/DEXTROMETHORPHAN 100-10MG/5
10 SYRUP ORAL 3 TIMES DAILY PRN
Qty: 300 ML | Refills: 0 | Status: SHIPPED | OUTPATIENT
Start: 2024-02-27 | End: 2024-02-27 | Stop reason: SDUPTHER

## 2024-02-27 NOTE — PROGRESS NOTES
"Chief Complaint  Earache (Right ear ache  started 3 days ago./)    Subjective        Panda Henry presents to [facility] PRIMARY [facility]ARE  History of Present Illness  The patient is a 73 y.o. male who is here today because of earache, congestion, cough, body aches and shortness of breath x 3 days. No fever, chills, headaches or dizziness. He denies any exposure to Covid or Flu.      Objective   Vital Signs:  /62   Pulse 81   Temp 98.2 °F (36.8 °C) (Temporal)   Resp 18   Ht 189.2 cm (74.5\")   Wt 106 kg (233 lb)   SpO2 98%   BMI 29.52 kg/m²   Estimated body mass index is 29.52 kg/m² as calculated from the following:    Height as of this encounter: 189.2 cm (74.5\").    Weight as of this encounter: 106 kg (233 lb).               Physical Exam  Vitals and nursing note reviewed.   Constitutional:       General: He is not in acute distress.     Appearance: Normal appearance. He is well-developed and well-groomed.   HENT:      Head: Normocephalic and atraumatic.      Jaw: No tenderness or pain on movement.      Salivary Glands: Right salivary gland is not diffusely enlarged. Left salivary gland is not diffusely enlarged.      Right Ear: Tympanic membrane and ear canal normal.      Left Ear: Tympanic membrane and ear canal normal.      Nose: Congestion and rhinorrhea present.      Right Sinus: Maxillary sinus tenderness and frontal sinus tenderness present.      Left Sinus: Maxillary sinus tenderness and frontal sinus tenderness present.      Mouth/Throat:      Mouth: Mucous membranes are moist.      Pharynx: No oropharyngeal exudate or posterior oropharyngeal erythema.   Eyes:      General: Lids are normal. No allergic shiner or scleral icterus.     Conjunctiva/sclera: Conjunctivae normal.      Pupils: Pupils are equal, round, and reactive to light.   Neck:      Thyroid: No thyroid mass, thyromegaly or thyroid tenderness.      Trachea: Trachea normal.   Cardiovascular:      Rate and Rhythm: " Normal rate and regular rhythm. No extrasystoles are present.     Pulses: Normal pulses.      Heart sounds: Normal heart sounds. No murmur heard.  Pulmonary:      Effort: Pulmonary effort is normal. No respiratory distress.      Breath sounds: Normal breath sounds. No decreased breath sounds, wheezing, rhonchi or rales.   Chest:   Breasts:     Right: Normal.      Left: Normal.   Abdominal:      General: Bowel sounds are normal.      Palpations: Abdomen is soft.      Tenderness: There is no abdominal tenderness. There is no right CVA tenderness, left CVA tenderness, guarding or rebound.   Musculoskeletal:      Cervical back: Neck supple.      Right lower leg: No edema.      Left lower leg: No edema.   Lymphadenopathy:      Cervical: No cervical adenopathy.      Upper Body:      Right upper body: No supraclavicular or axillary adenopathy.      Left upper body: No supraclavicular or axillary adenopathy.   Skin:     General: Skin is warm.      Nails: There is no clubbing.   Neurological:      General: No focal deficit present.      Mental Status: He is alert and oriented to person, place, and time.      Sensory: Sensation is intact.      Motor: Motor function is intact.      Coordination: Coordination is intact.   Psychiatric:         Attention and Perception: Attention and perception normal.         Mood and Affect: Mood normal.         Speech: Speech normal.         Behavior: Behavior normal. Behavior is cooperative.         Thought Content: Thought content normal.        Result Review :                     Assessment and Plan     Diagnoses and all orders for this visit:    1. Cough, unspecified type (Primary)  -     POCT SARS-CoV-2 Antigen PAVITHRA + Flu    2. Acute non-recurrent maxillary sinusitis  -     amoxicillin (AMOXIL) 875 MG tablet; Take 1 tablet by mouth 2 (Two) Times a Day for 10 days.  Dispense: 20 tablet; Refill: 0  -     guaiFENesin-dextromethorphan (ROBITUSSIN DM) 100-10 MG/5ML syrup; Take 10 mL by mouth 3  (Three) Times a Day As Needed for Cough or Congestion for up to 10 days.  Dispense: 300 mL; Refill: 0    3. Acute non-recurrent frontal sinusitis  -     amoxicillin (AMOXIL) 875 MG tablet; Take 1 tablet by mouth 2 (Two) Times a Day for 10 days.  Dispense: 20 tablet; Refill: 0  -     guaiFENesin-dextromethorphan (ROBITUSSIN DM) 100-10 MG/5ML syrup; Take 10 mL by mouth 3 (Three) Times a Day As Needed for Cough or Congestion for up to 10 days.  Dispense: 300 mL; Refill: 0    Other orders  -     Discontinue: amoxicillin (AMOXIL) 875 MG tablet; Take 1 tablet by mouth 2 (Two) Times a Day for 10 days.  Dispense: 20 tablet; Refill: 0  -     Discontinue: guaiFENesin-dextromethorphan (ROBITUSSIN DM) 100-10 MG/5ML syrup; Take 10 mL by mouth 3 (Three) Times a Day As Needed for Cough or Congestion for up to 10 days.  Dispense: 300 mL; Refill: 0           I spent 15 minutes caring for Panda on this date of service. This time includes time spent by me in the following activities:preparing for the visit, performing a medically appropriate examination and/or evaluation , counseling and educating the patient/family/caregiver, ordering medications, tests, or procedures, and documenting information in the medical record  Follow Up     Return in about 2 weeks (around 3/12/2024) for Annual physical, Medicare Wellness, Blood work.  Patient was given instructions and counseling regarding his condition or for health maintenance advice. Please see specific information pulled into the AVS if appropriate.     The patient is advised to continue all of his regular medications as prescribed. He was counseled regarding the importance of diet, exercise and medication compliance.      This document has been electronically signed by Alberto Green MD  February 27, 2024 14:28 EST

## 2024-02-27 NOTE — TELEPHONE ENCOUNTER
Hub staff attempted to follow warm transfer process and was unsuccessful     Caller: Panda Henry    Relationship to patient: Self    Best call back number: [phone]    PATIENT HAS ARRIVED FOR HIS APPOINTMENT AND IS AT THE BACK DOOR

## 2024-03-04 ENCOUNTER — OFFICE VISIT (OUTPATIENT)
Dept: NEUROSURGERY | Facility: CLINIC | Age: 74
End: 2024-03-04
Payer: MEDICARE

## 2024-03-04 VITALS
SYSTOLIC BLOOD PRESSURE: 140 MMHG | DIASTOLIC BLOOD PRESSURE: 62 MMHG | WEIGHT: 229.8 LBS | HEIGHT: 75 IN | BODY MASS INDEX: 28.57 KG/M2 | TEMPERATURE: 97.5 F

## 2024-03-04 DIAGNOSIS — G95.9 CERVICAL MYELOPATHY: Primary | ICD-10-CM

## 2024-03-04 PROCEDURE — 3078F DIAST BP <80 MM HG: CPT | Performed by: PHYSICIAN ASSISTANT

## 2024-03-04 PROCEDURE — 1159F MED LIST DOCD IN RCRD: CPT | Performed by: PHYSICIAN ASSISTANT

## 2024-03-04 PROCEDURE — 1160F RVW MEDS BY RX/DR IN RCRD: CPT | Performed by: PHYSICIAN ASSISTANT

## 2024-03-04 PROCEDURE — 99213 OFFICE O/P EST LOW 20 MIN: CPT | Performed by: PHYSICIAN ASSISTANT

## 2024-03-04 PROCEDURE — 3077F SYST BP >= 140 MM HG: CPT | Performed by: PHYSICIAN ASSISTANT

## 2024-03-04 NOTE — PROGRESS NOTES
Patient: Panda Henry  : 1950  Chart #: [id number]    Date of Service: 3/4/2024    CHIEF COMPLAINT: Neck and arm pain with sensory alteration     History of Present Illness Mr. Henry is a 73-year-old retired  with a past medical history significant for CAD (s/p stenting- most recent was with Dr Bansal in ) on plavix, HTN, HLD, and chronic back pain.  He presented to our clinic with neck and bilateral arm pain with sensory alteration and gait disturbance.  Preoperative studies demonstrated mild myelopathy.  Ultimately on 2023 underwent ACDF C4-6.  Postoperatively, his hand symptoms and arm symptoms improved. He still has a little numbness in his hands at night that improved with repositioning.  His gait continues to show improvements. He complains of dorsal neck discomfort that will extend into the shoulders. Symptoms improve with Aspercream    Past Medical History:   Diagnosis Date    Arthritis     Asthma     Cancer     basal cell carcinoma    Cervical disc disorder     COPD (chronic obstructive pulmonary disease)     Coronary artery disease     Depression     Diabetes mellitus     GERD (gastroesophageal reflux disease)     Headache     Hyperlipidemia     Hypertension     Injury of back     Low back pain     Lumbosacral disc disease     Neuropathy     Peripheral neuropathy     Pulmonary arterial hypertension     Spinal headache     AFTER MYELOGRAM-PER PATIENT, HEADACHE FOR 9 NINE DAYS    Spinal stenosis     Thoracic disc disorder          Current Outpatient Medications:     albuterol sulfate  (90 Base) MCG/ACT inhaler, Inhale 2 puffs Every 4 (Four) Hours As Needed for Wheezing., Disp: , Rfl:     amoxicillin (AMOXIL) 875 MG tablet, Take 1 tablet by mouth 2 (Two) Times a Day for 10 days., Disp: 20 tablet, Rfl: 0    aspirin 81 MG EC tablet, Take 1 tablet by mouth Daily., Disp: , Rfl:     budesonide-formoterol (SYMBICORT) 160-4.5 MCG/ACT inhaler, INHALE 2  PUFFS TWICE A DAY FOR 30 DAYS, Disp: 10.2 each, Rfl: 2    carboxymethylcellulose (REFRESH PLUS) 0.5 % solution, 3 (Three) Times a Day As Needed for Dry Eyes., Disp: , Rfl:     carvedilol (COREG) 25 MG tablet, Take 1 tablet by mouth 2 (Two) Times a Day With Meals., Disp: 180 tablet, Rfl: 3    clopidogrel (PLAVIX) 75 MG tablet, TAKE 1 TABLET BY MOUTH EVERY DAY, Disp: 90 tablet, Rfl: 3    doxepin (SINEquan) 100 MG capsule, Take 1 capsule by mouth every night at bedtime., Disp: , Rfl:     escitalopram (LEXAPRO) 20 MG tablet, TAKE 2 & 1/2 TABLETS BY MOUTH DAILY 90 DAYS, Disp: 225 tablet, Rfl: 0    ezetimibe (ZETIA) 10 MG tablet, Take 1 tablet by mouth Daily., Disp: , Rfl:     gabapentin (NEURONTIN) 400 MG capsule, Take 1 capsule by mouth 3 (Three) Times a Day., Disp: , Rfl:     glucose 4-6 GM-MG per chewable tablet, Chew 2 tablets As Needed for Low Blood Sugar., Disp: , Rfl:     guaiFENesin-dextromethorphan (ROBITUSSIN DM) 100-10 MG/5ML syrup, Take 10 mL by mouth 3 (Three) Times a Day As Needed for Cough or Congestion for up to 10 days., Disp: 300 mL, Rfl: 0    hydrALAZINE (APRESOLINE) 25 MG tablet, Take 1 tablet by mouth 3 (Three) Times a Day., Disp: 270 tablet, Rfl: 3    hydrOXYzine (ATARAX) 25 MG tablet, Take 1 tablet by mouth 1 (One) Time As Needed for Anxiety., Disp: , Rfl:     insulin aspart (novoLOG FLEXPEN) 100 UNIT/ML solution pen-injector sc pen, Inject 14 Units under the skin into the appropriate area as directed 2 (Two) Times a Day With Meals. 16 units in the am, 14 units in the Pm, Disp: , Rfl:     insulin glargine (LANTUS, SEMGLEE) 100 UNIT/ML injection, Inject 34 Units under the skin into the appropriate area as directed Daily. Recently changed to 34u in AM, Disp: , Rfl:     loratadine (CLARITIN) 10 MG tablet, Take 1 tablet by mouth Daily., Disp: , Rfl:     LORazepam (ATIVAN) 2 MG tablet, Take 1 tablet by mouth 2 (Two) Times a Day., Disp: , Rfl:     lovastatin (MEVACOR) 40 MG tablet, Take 1 tablet by mouth  Every Night for 30 days., Disp: 30 tablet, Rfl: 2    montelukast (SINGULAIR) 10 MG tablet, Take 1 tablet by mouth Every Night., Disp: , Rfl:     nitroglycerin (NITROSTAT) 0.4 MG SL tablet, Place 1 tablet under the tongue Every 5 (Five) Minutes As Needed for Chest Pain. Take no more than 3 doses in 15 minutes., Disp: , Rfl:     omeprazole (priLOSEC) 40 MG capsule, TAKE 1 CAPSULE BY MOUTH EVERY DAY 30 MINUTES BEFORE MORNING MEAL, Disp: , Rfl:     sacubitril-valsartan (Entresto) 49-51 MG tablet, Take 1 tablet by mouth 2 (Two) Times a Day., Disp: 180 tablet, Rfl: 3    salsalate (DISALCID) 750 MG tablet, Take 1 tablet by mouth 2 (Two) Times a Day., Disp: , Rfl:     tamsulosin (FLOMAX) 0.4 MG capsule 24 hr capsule, Take 1 capsule by mouth Daily., Disp: , Rfl:     Gel Base gel, mannitol 20%, capsaicin 0.001%, lidocaine 10%, prilocaine 2% cream, apply cream to affected areas every Q4-6 hoursPRN, Disp: 240 g, Rfl: 5    tadalafil (Cialis) 10 MG tablet, Take 1 tablet by mouth Daily As Needed for Erectile Dysfunction for up to 10 days., Disp: 10 tablet, Rfl: 2    Past Surgical History:   Procedure Laterality Date    ANTERIOR CERVICAL DISCECTOMY W/ FUSION N/A 6/1/2023    Procedure: CERVICAL DISCECTOMY ANTERIOR WITH FUSION C4-5, C5-6;  Surgeon: Zana Olmos MD;  Location:  [facility];  Service: Neurosurgery;  Laterality: N/A;    BLEPHAROPLASTY Bilateral     CARDIAC CATHETERIZATION      CARDIAC CATHETERIZATION      11/30/2022 per Dr. Bansal    CARDIAC CATHETERIZATION Left 11/30/2022    Procedure: Left Heart Cath;  Surgeon: Moisés Bansal MD;  Location:  [facility];  Service: Cardiovascular;  Laterality: Left;    CORONARY STENT PLACEMENT      SHOULDER ROTATOR CUFF REPAIR Right     SINUPLASTY      SKIN BIOPSY      ON NOSE AND RIGHT CHEEK    TRIGGER POINT INJECTION         Social History     Socioeconomic History    Marital status:    Tobacco Use    Smoking status: Never    Smokeless tobacco: Current      Types: Chew   Vaping Use    Vaping status: Never Used   Substance and Sexual Activity    Alcohol use: Never     Comment: Drank some years ago    Drug use: Never    Sexual activity: Not Currently     Partners: Female         Review of Systems   Constitutional:  Positive for fatigue. Negative for activity change, appetite change, chills, diaphoresis, fever and unexpected weight change.   HENT:  Positive for hearing loss and postnasal drip. Negative for congestion, dental problem, drooling, ear discharge, ear pain, facial swelling, mouth sores, nosebleeds, rhinorrhea, sinus pressure, sneezing, sore throat, tinnitus, trouble swallowing and voice change.    Eyes:  Positive for itching. Negative for photophobia, pain, discharge, redness and visual disturbance.   Respiratory:  Positive for shortness of breath. Negative for apnea, cough, choking, chest tightness, wheezing and stridor.    Cardiovascular: Negative.  Negative for chest pain, palpitations and leg swelling.   Gastrointestinal: Negative.  Negative for abdominal distention, abdominal pain, anal bleeding, blood in stool, constipation, diarrhea, nausea, rectal pain and vomiting.   Endocrine: Negative.  Negative for cold intolerance, heat intolerance, polydipsia, polyphagia and polyuria.   Genitourinary:  Positive for genital sores. Negative for decreased urine volume, difficulty urinating, dysuria, enuresis, flank pain, frequency, hematuria and urgency.   Musculoskeletal:  Positive for back pain, gait problem, neck pain and neck stiffness. Negative for arthralgias, joint swelling and myalgias.   Skin: Negative.  Negative for color change, pallor, rash and wound.   Allergic/Immunologic: Positive for environmental allergies. Negative for food allergies and immunocompromised state.   Neurological:  Positive for dizziness, tremors, weakness, light-headedness, numbness and headaches. Negative for seizures, syncope, facial asymmetry and speech difficulty.   Hematological:  "Negative.  Negative for adenopathy. Does not bruise/bleed easily.   Psychiatric/Behavioral:  Positive for agitation, confusion and decreased concentration. Negative for behavioral problems, dysphoric mood, hallucinations, self-injury, sleep disturbance and suicidal ideas. The patient is nervous/anxious. The patient is not hyperactive.    All other systems reviewed and are negative.      Objective   Vital Signs: Blood pressure 140/62, temperature 97.5 °F (36.4 °C), temperature source Infrared, height 189.2 cm (74.5\"), weight 104 kg (229 lb 12.8 oz).  Physical Exam  Vitals and nursing note reviewed.   Constitutional:       General: He is not in acute distress.     Appearance: He is well-developed.   HENT:      Head: Normocephalic and atraumatic.   Pulmonary:      Breath sounds: Normal breath sounds.   Psychiatric:         Behavior: Behavior normal.         Thought Content: Thought content normal.     Musculoskeletal:     Strength is intact in upper and lower extremities to direct testing.     Station and gait are normal.  Neurologic:     Muscle tone is normal throughout.     Coordination is intact.     Deep tendon reflexes: 3+ bicep, 2+ tricep.  Difficult to elicit in the lower extremities     Sensation is intact to light touch throughout.     Patient is oriented to person, place, and time.     Yvonne sign negative.  No ankle clonus       Independent review of radiographic imaging: Plain films of the cervical spine dated 3/1/2024 demonstrate good placement and alignment of surgical construct.    Assessment & Plan   Diagnosis:   1.  Cervical spondylosis with radiculomyelopathy  2.  Chronic mechanical back pain  3.  Bilateral carpal tunnel syndrome    Medical Decision Making: Mr. Henry is doing well. I prescribed a compounding cream to use on his neck and back. Follow up in 4 months with plain films of the cervical spine.     Diagnoses and all orders for this visit:    1. Cervical spondylosis with myelopathy " (Primary)      2. Cervical radiculopathy                 Juanita Rajan PA-C  Patient Care Team:  Alberto Green MD as PCP - General (Family Medicine)  Moisés Bansal MD as Consulting Physician (Cardiology)  Alberto Green MD as Referring Physician (Family Medicine)  Zana Olmos MD as Surgeon (Neurosurgery)

## 2024-03-08 ENCOUNTER — HOSPITAL ENCOUNTER (OUTPATIENT)
Dept: CARDIOLOGY | Facility: HOSPITAL | Age: 74
Discharge: HOME | End: 2024-03-08
Payer: MEDICARE

## 2024-03-08 DIAGNOSIS — I10 ESSENTIAL HYPERTENSION: ICD-10-CM

## 2024-03-08 DIAGNOSIS — R53.1 GENERALIZED WEAKNESS: ICD-10-CM

## 2024-03-08 DIAGNOSIS — I25.10 CORONARY ARTERY DISEASE INVOLVING NATIVE CORONARY ARTERY OF NATIVE HEART WITHOUT ANGINA PECTORIS: ICD-10-CM

## 2024-03-08 DIAGNOSIS — I95.1 AUTONOMIC ORTHOSTATIC HYPOTENSION: ICD-10-CM

## 2024-03-08 DIAGNOSIS — R55 NEAR SYNCOPE: ICD-10-CM

## 2024-03-08 PROCEDURE — 93306 TTE W/DOPPLER COMPLETE: CPT

## 2024-03-09 LAB
BH CV ECHO MEAS - ACS: 1.93 CM
BH CV ECHO MEAS - AO MAX PG: 7.4 MMHG
BH CV ECHO MEAS - AO MEAN PG: 4.6 MMHG
BH CV ECHO MEAS - AO ROOT DIAM: 2.8 CM
BH CV ECHO MEAS - AO V2 MAX: 135.7 CM/SEC
BH CV ECHO MEAS - AO V2 VTI: 33.4 CM
BH CV ECHO MEAS - EDV(CUBED): 86.4 ML
BH CV ECHO MEAS - EDV(MOD-SP4): 137 ML
BH CV ECHO MEAS - EF(MOD-SP4): 51.9 %
BH CV ECHO MEAS - EF_3D-VOL: 62 %
BH CV ECHO MEAS - ESV(CUBED): 14 ML
BH CV ECHO MEAS - ESV(MOD-SP4): 65.9 ML
BH CV ECHO MEAS - FS: 45.5 %
BH CV ECHO MEAS - IVS/LVPW: 1.14 CM
BH CV ECHO MEAS - IVSD: 1.71 CM
BH CV ECHO MEAS - LA DIMENSION: 4.2 CM
BH CV ECHO MEAS - LAT PEAK E' VEL: 5.1 CM/SEC
BH CV ECHO MEAS - LV DIASTOLIC VOL/BSA (35-75): 59.5 CM2
BH CV ECHO MEAS - LV MASS(C)D: 298.3 GRAMS
BH CV ECHO MEAS - LV SYSTOLIC VOL/BSA (12-30): 28.6 CM2
BH CV ECHO MEAS - LVIDD: 4.4 CM
BH CV ECHO MEAS - LVIDS: 2.41 CM
BH CV ECHO MEAS - LVPWD: 1.5 CM
BH CV ECHO MEAS - MED PEAK E' VEL: 5.3 CM/SEC
BH CV ECHO MEAS - MV A MAX VEL: 86.1 CM/SEC
BH CV ECHO MEAS - MV DEC SLOPE: 359.4 CM/SEC2
BH CV ECHO MEAS - MV E MAX VEL: 87.8 CM/SEC
BH CV ECHO MEAS - MV E/A: 1.02
BH CV ECHO MEAS - RVDD: 3.2 CM
BH CV ECHO MEAS - SI(MOD-SP4): 30.9 ML/M2
BH CV ECHO MEAS - SV(MOD-SP4): 71.1 ML
BH CV ECHO MEASUREMENTS AVERAGE E/E' RATIO: 16.88
BH CV XLRA - RV BASE: 4 CM
BH CV XLRA - RV LENGTH: 8.8 CM
BH CV XLRA - RV MID: 2.7 CM
LEFT ATRIUM VOLUME INDEX: 22 ML/M2

## 2024-03-15 ENCOUNTER — OFFICE VISIT (OUTPATIENT)
Dept: FAMILY MEDICINE CLINIC | Facility: CLINIC | Age: 74
End: 2024-03-15
Payer: MEDICARE

## 2024-03-15 VITALS
BODY MASS INDEX: 28.37 KG/M2 | WEIGHT: 233 LBS | OXYGEN SATURATION: 97 % | SYSTOLIC BLOOD PRESSURE: 167 MMHG | TEMPERATURE: 97.1 F | DIASTOLIC BLOOD PRESSURE: 78 MMHG | HEART RATE: 72 BPM | HEIGHT: 76 IN | RESPIRATION RATE: 18 BRPM

## 2024-03-15 DIAGNOSIS — K21.9 GASTROESOPHAGEAL REFLUX DISEASE WITHOUT ESOPHAGITIS: ICD-10-CM

## 2024-03-15 DIAGNOSIS — I10 ESSENTIAL HYPERTENSION: ICD-10-CM

## 2024-03-15 DIAGNOSIS — Z00.00 MEDICARE ANNUAL WELLNESS VISIT, SUBSEQUENT: Primary | ICD-10-CM

## 2024-03-15 DIAGNOSIS — E78.49 OTHER HYPERLIPIDEMIA: ICD-10-CM

## 2024-03-15 DIAGNOSIS — E11.40 TYPE 2 DIABETES MELLITUS WITH DIABETIC NEUROPATHY, WITH LONG-TERM CURRENT USE OF INSULIN: ICD-10-CM

## 2024-03-15 DIAGNOSIS — Z12.5 SCREENING FOR PROSTATE CANCER: ICD-10-CM

## 2024-03-15 DIAGNOSIS — Z79.4 TYPE 2 DIABETES MELLITUS WITH DIABETIC NEUROPATHY, WITH LONG-TERM CURRENT USE OF INSULIN: ICD-10-CM

## 2024-03-15 DIAGNOSIS — J44.9 CHRONIC OBSTRUCTIVE PULMONARY DISEASE, UNSPECIFIED COPD TYPE: ICD-10-CM

## 2024-03-15 LAB
ALBUMIN SERPL-MCNC: 4.4 G/DL (ref 3.5–5.2)
ALBUMIN/GLOB SERPL: 1.8 G/DL
ALP SERPL-CCNC: 72 U/L (ref 39–117)
ALT SERPL W P-5'-P-CCNC: 15 U/L (ref 1–41)
ANION GAP SERPL CALCULATED.3IONS-SCNC: 13.7 MMOL/L (ref 5–15)
AST SERPL-CCNC: 11 U/L (ref 1–40)
BASOPHILS # BLD AUTO: 0.06 10*3/MM3 (ref 0–0.2)
BASOPHILS NFR BLD AUTO: 0.9 % (ref 0–1.5)
BILIRUB BLD-MCNC: NEGATIVE MG/DL
BILIRUB SERPL-MCNC: 0.4 MG/DL (ref 0–1.2)
BUN SERPL-MCNC: 9 MG/DL (ref 8–23)
BUN/CREAT SERPL: 11.7 (ref 7–25)
CALCIUM SPEC-SCNC: 8.7 MG/DL (ref 8.6–10.5)
CHLORIDE SERPL-SCNC: 100 MMOL/L (ref 98–107)
CHOLEST SERPL-MCNC: 152 MG/DL (ref 0–200)
CLARITY, POC: CLEAR
CO2 SERPL-SCNC: 26.3 MMOL/L (ref 22–29)
COLOR UR: YELLOW
CREAT SERPL-MCNC: 0.77 MG/DL (ref 0.76–1.27)
DEPRECATED RDW RBC AUTO: 42 FL (ref 37–54)
EGFRCR SERPLBLD CKD-EPI 2021: 94.5 ML/MIN/1.73
EOSINOPHIL # BLD AUTO: 0.23 10*3/MM3 (ref 0–0.4)
EOSINOPHIL NFR BLD AUTO: 3.3 % (ref 0.3–6.2)
ERYTHROCYTE [DISTWIDTH] IN BLOOD BY AUTOMATED COUNT: 13.3 % (ref 12.3–15.4)
EXPIRATION DATE: ABNORMAL
GLOBULIN UR ELPH-MCNC: 2.4 GM/DL
GLUCOSE SERPL-MCNC: 161 MG/DL (ref 65–99)
GLUCOSE UR STRIP-MCNC: ABNORMAL MG/DL
HBA1C MFR BLD: 7.7 % (ref 4.5–5.7)
HCT VFR BLD AUTO: 47.7 % (ref 37.5–51)
HCV AB SER DONR QL: NORMAL
HDLC SERPL-MCNC: 47 MG/DL (ref 40–60)
HGB BLD-MCNC: 16.1 G/DL (ref 13–17.7)
IMM GRANULOCYTES # BLD AUTO: 0.02 10*3/MM3 (ref 0–0.05)
IMM GRANULOCYTES NFR BLD AUTO: 0.3 % (ref 0–0.5)
KETONES UR QL: NEGATIVE
LDLC SERPL CALC-MCNC: 87 MG/DL (ref 0–100)
LDLC/HDLC SERPL: 1.83 {RATIO}
LEUKOCYTE EST, POC: NEGATIVE
LYMPHOCYTES # BLD AUTO: 1.14 10*3/MM3 (ref 0.7–3.1)
LYMPHOCYTES NFR BLD AUTO: 16.2 % (ref 19.6–45.3)
Lab: ABNORMAL
MCH RBC QN AUTO: 29.5 PG (ref 26.6–33)
MCHC RBC AUTO-ENTMCNC: 33.8 G/DL (ref 31.5–35.7)
MCV RBC AUTO: 87.4 FL (ref 79–97)
MONOCYTES # BLD AUTO: 0.57 10*3/MM3 (ref 0.1–0.9)
MONOCYTES NFR BLD AUTO: 8.1 % (ref 5–12)
NEUTROPHILS NFR BLD AUTO: 5.03 10*3/MM3 (ref 1.7–7)
NEUTROPHILS NFR BLD AUTO: 71.2 % (ref 42.7–76)
NITRITE UR-MCNC: NEGATIVE MG/ML
NRBC BLD AUTO-RTO: 0 /100 WBC (ref 0–0.2)
PH UR: 6 [PH] (ref 5–8)
PLATELET # BLD AUTO: 288 10*3/MM3 (ref 140–450)
PMV BLD AUTO: 10.5 FL (ref 6–12)
POTASSIUM SERPL-SCNC: 3.1 MMOL/L (ref 3.5–5.2)
PROT SERPL-MCNC: 6.8 G/DL (ref 6–8.5)
PROT UR STRIP-MCNC: NEGATIVE MG/DL
RBC # BLD AUTO: 5.46 10*6/MM3 (ref 4.14–5.8)
RBC # UR STRIP: NEGATIVE /UL
SODIUM SERPL-SCNC: 140 MMOL/L (ref 136–145)
SP GR UR: 1.01 (ref 1–1.03)
TRIGL SERPL-MCNC: 96 MG/DL (ref 0–150)
TSH SERPL DL<=0.05 MIU/L-ACNC: 4.01 UIU/ML (ref 0.27–4.2)
UROBILINOGEN UR QL: NORMAL
VLDLC SERPL-MCNC: 18 MG/DL (ref 5–40)
WBC NRBC COR # BLD AUTO: 7.05 10*3/MM3 (ref 3.4–10.8)

## 2024-03-15 PROCEDURE — G0103 PSA SCREENING: HCPCS | Performed by: FAMILY MEDICINE

## 2024-03-15 PROCEDURE — 86803 HEPATITIS C AB TEST: CPT | Performed by: FAMILY MEDICINE

## 2024-03-15 PROCEDURE — 85025 COMPLETE CBC W/AUTO DIFF WBC: CPT | Performed by: FAMILY MEDICINE

## 2024-03-15 PROCEDURE — 84443 ASSAY THYROID STIM HORMONE: CPT | Performed by: FAMILY MEDICINE

## 2024-03-15 PROCEDURE — 80053 COMPREHEN METABOLIC PANEL: CPT | Performed by: FAMILY MEDICINE

## 2024-03-15 PROCEDURE — 80061 LIPID PANEL: CPT | Performed by: FAMILY MEDICINE

## 2024-03-15 PROCEDURE — 82043 UR ALBUMIN QUANTITATIVE: CPT | Performed by: FAMILY MEDICINE

## 2024-03-15 NOTE — PROGRESS NOTES
The ABCs of the Annual Wellness Visit  Subsequent Medicare Wellness Visit    Subjective    Panda Henry is a 73 y.o. male who presents for a Subsequent Medicare Wellness Visit.    The following portions of the patient's history were reviewed and   updated as appropriate: allergies, current medications, past family history, past medical history, past social history, past surgical history, and problem list.    Compared to one year ago, the patient feels his physical   health is better.    Compared to one year ago, the patient feels his mental   health is better.    Recent Hospitalizations:  This patient has had a [facility] admission record on file within the last 365 days.    Current Medical Providers:  Patient Care Team:  Alberto Green MD as PCP - General (Family Medicine)  Moisés Bansal MD as Consulting Physician (Cardiology)  Alberto Green MD as Referring Physician (Family Medicine)  Zana Olmos MD as Surgeon (Neurosurgery)    Outpatient Medications Prior to Visit   Medication Sig Dispense Refill    albuterol sulfate  (90 Base) MCG/ACT inhaler Inhale 2 puffs Every 4 (Four) Hours As Needed for Wheezing.      aspirin 81 MG EC tablet Take 1 tablet by mouth Daily.      budesonide-formoterol (SYMBICORT) 160-4.5 MCG/ACT inhaler INHALE 2 PUFFS TWICE A DAY FOR 30 DAYS 10.2 each 2    carboxymethylcellulose (REFRESH PLUS) 0.5 % solution 3 (Three) Times a Day As Needed for Dry Eyes.      carvedilol (COREG) 25 MG tablet Take 1 tablet by mouth 2 (Two) Times a Day With Meals. 180 tablet 3    clopidogrel (PLAVIX) 75 MG tablet TAKE 1 TABLET BY MOUTH EVERY DAY 90 tablet 3    doxepin (SINEquan) 100 MG capsule Take 1 capsule by mouth every night at bedtime.      escitalopram (LEXAPRO) 20 MG tablet TAKE 2 & 1/2 TABLETS BY MOUTH DAILY 90 DAYS 225 tablet 0    ezetimibe (ZETIA) 10 MG tablet Take 1 tablet by mouth Daily.      gabapentin (NEURONTIN) 400 MG capsule Take 1 capsule by mouth 3 (Three) Times a  Day.      Gel Base gel mannitol 20%, capsaicin 0.001%, lidocaine 10%, prilocaine 2% cream, apply cream to affected areas every Q4-6 hoursPRN 240 g 5    glucose 4-6 GM-MG per chewable tablet Chew 2 tablets As Needed for Low Blood Sugar.      hydrALAZINE (APRESOLINE) 25 MG tablet Take 1 tablet by mouth 3 (Three) Times a Day. 270 tablet 3    hydrOXYzine (ATARAX) 25 MG tablet Take 1 tablet by mouth 1 (One) Time As Needed for Anxiety.      insulin aspart (novoLOG FLEXPEN) 100 UNIT/ML solution pen-injector sc pen Inject 14 Units under the skin into the appropriate area as directed 2 (Two) Times a Day With Meals. 16 units in the am, 14 units in the Pm      insulin glargine (LANTUS, SEMGLEE) 100 UNIT/ML injection Inject 34 Units under the skin into the appropriate area as directed Daily. Recently changed to 34u in AM      loratadine (CLARITIN) 10 MG tablet Take 1 tablet by mouth Daily.      LORazepam (ATIVAN) 2 MG tablet Take 1 tablet by mouth 2 (Two) Times a Day.      lovastatin (MEVACOR) 40 MG tablet Take 1 tablet by mouth Every Night for 30 days. 30 tablet 2    montelukast (SINGULAIR) 10 MG tablet Take 1 tablet by mouth Every Night.      nitroglycerin (NITROSTAT) 0.4 MG SL tablet Place 1 tablet under the tongue Every 5 (Five) Minutes As Needed for Chest Pain. Take no more than 3 doses in 15 minutes.      omeprazole (priLOSEC) 40 MG capsule TAKE 1 CAPSULE BY MOUTH EVERY DAY 30 MINUTES BEFORE MORNING MEAL      sacubitril-valsartan (Entresto) 49-51 MG tablet Take 1 tablet by mouth 2 (Two) Times a Day. 180 tablet 3    salsalate (DISALCID) 750 MG tablet Take 1 tablet by mouth 2 (Two) Times a Day.      tamsulosin (FLOMAX) 0.4 MG capsule 24 hr capsule Take 1 capsule by mouth Daily.      tadalafil (Cialis) 10 MG tablet Take 1 tablet by mouth Daily As Needed for Erectile Dysfunction for up to 10 days. 10 tablet 2     No facility-administered medications prior to visit.       No opioid medication identified on active medication  "list. I have reviewed chart for other potential  high risk medication/s and harmful drug interactions in the elderly.        Aspirin is on active medication list. Aspirin use is indicated based on review of current medical condition/s. Pros and cons of this therapy have been discussed today. Benefits of this medication outweigh potential harm.  Patient has been encouraged to continue taking this medication.  .      Patient Active Problem List   Diagnosis    Generalized weakness    Chest pain    Near syncope    Coronary artery disease    Diabetes mellitus    Chronic back pain    Hypokalemia    BPH (benign prostatic hyperplasia)    Anxiety    Mild episode of recurrent major depressive disorder    Chronic obstructive pulmonary disease, unspecified    Chronic neck pain    Gastroesophageal reflux disease without esophagitis    Eczema    Other hyperlipidemia    Nodule of right lung    Chronic pain of both knees    Positive cardiac stress test    Bilateral lower extremity edema    Cervical spinal stenosis    Essential hypertension    Cervical spondylosis with myelopathy    Cervical radiculopathy    ED (erectile dysfunction)    Scrotal nodule    Autonomic orthostatic hypotension     Advance Care Planning   Advance Care Planning     Advance Directive is on file.  ACP discussion was held with the patient during this visit. Patient has an advance directive in EMR which is still valid.      Objective    Vitals:    03/15/24 1119   BP: 167/78   BP Location: Right arm   Patient Position: Sitting   Cuff Size: Adult   Pulse: 72   Resp: 18   Temp: 97.1 °F (36.2 °C)   TempSrc: Temporal   SpO2: 97%   Weight: 106 kg (233 lb)   Height: 191.8 cm (75.5\")   PainSc: 0-No pain     Estimated body mass index is 28.74 kg/m² as calculated from the following:    Height as of this encounter: 191.8 cm (75.5\").    Weight as of this encounter: 106 kg (233 lb).           Does the patient have evidence of cognitive impairment? No    Lab Results "   Component Value Date    TRIG 96 03/15/2024    HDL 47 03/15/2024    LDL 87 03/15/2024    VLDL 18 03/15/2024    HGBA1C 7.7 (A) 03/15/2024        HEALTH RISK ASSESSMENT    Smoking Status:  Social History     Tobacco Use   Smoking Status Never   Smokeless Tobacco Current    Types: Chew     Alcohol Consumption:  Social History     Substance and Sexual Activity   Alcohol Use Never    Comment: Drank some years ago     Fall Risk Screen:    PATEL Fall Risk Assessment was completed, and patient is at LOW risk for falls.Assessment completed on:3/15/2024    Depression Screening:      3/15/2024    11:15 AM   PHQ-2/PHQ-9 Depression Screening   Little Interest or Pleasure in Doing Things 0-->not at all   Feeling Down, Depressed or Hopeless 0-->not at all   PHQ-9: Brief Depression Severity Measure Score 0       Health Habits and Functional and Cognitive Screening:      3/14/2024     1:29 PM   Functional & Cognitive Status   Do you have difficulty preparing food and eating? No   Do you have difficulty bathing yourself, getting dressed or grooming yourself? No   Do you have difficulty using the toilet? No   Do you have difficulty moving around from place to place? No   Do you have trouble with steps or getting out of a bed or a chair? No   Current Diet Diabetic Diet   Eye Exam Up to date   Exercise (times per week) 1 times per week   Do you need help using the phone?  No   Are you deaf or do you have serious difficulty hearing?  No   Do you need help to go to places out of walking distance? No   Do you need help shopping? No   Do you need help preparing meals?  No   Do you need help with housework?  No   Do you need help with laundry? No   Do you need help taking your medications? Yes   Do you need help managing money? Yes   Do you ever drive or ride in a car without wearing a seat belt? No   Have you felt unusual stress, anger or loneliness in the last month? No   Who do you live with? Spouse   If you need help, do you have  trouble finding someone available to you? No   Have you been bothered in the last four weeks by sexual problems? No   Do you have difficulty concentrating, remembering or making decisions? No       Age-appropriate Screening Schedule:  Refer to the list below for future screening recommendations based on patient's age, sex and/or medical conditions. Orders for these recommended tests are listed in the plan section. The patient has been provided with a written plan.    Health Maintenance   Topic Date Due    COVID-19 Vaccine (3 - 2023-24 season) 03/03/2025 (Originally 9/1/2023)    RSV Vaccine - Adults (1 - 1-dose 60+ series) 03/03/2025 (Originally 5/15/2010)    ZOSTER VACCINE (1 of 2) 03/15/2025 (Originally 5/15/2000)    HEMOGLOBIN A1C  06/15/2024    BMI FOLLOWUP  06/16/2024    DIABETIC EYE EXAM  01/08/2025    ANNUAL WELLNESS VISIT  03/15/2025    DIABETIC FOOT EXAM  03/15/2025    LIPID PANEL  03/15/2025    URINE MICROALBUMIN  03/15/2025    COLORECTAL CANCER SCREENING  09/28/2027    TDAP/TD VACCINES (2 - Td or Tdap) 05/11/2031    HEPATITIS C SCREENING  Completed    INFLUENZA VACCINE  Completed    Pneumococcal Vaccine 65+  Completed                  CMS Preventative Services Quick Reference  Risk Factors Identified During Encounter  Immunizations Discussed/Encouraged: Shingrix, COVID19, and RSV (Respiratory Syncytial Virus)  The above risks/problems have been discussed with the patient.  Pertinent information has been shared with the patient in the After Visit Summary.  An After Visit Summary and PPPS were made available to the patient.    Follow Up:   Next Medicare Wellness visit to be scheduled in 1 year.       Additional E&M Note during same encounter follows:  Patient has multiple medical problems which are significant and separately identifiable that require additional work above and beyond the Medicare Wellness Visit.      Chief Complaint  Medicare Wellness-subsequent    Subjective        HPI  Panda Henry is  "also being seen today for his diabetes, hypertension, hyperlipidemia, COPD and GERD.         Objective   Vital Signs:  /78 (BP Location: Right arm, Patient Position: Sitting, Cuff Size: Adult)   Pulse 72   Temp 97.1 °F (36.2 °C) (Temporal)   Resp 18   Ht 191.8 cm (75.5\")   Wt 106 kg (233 lb)   SpO2 97%   BMI 28.74 kg/m²     Physical Exam  Constitutional:       Appearance: Normal appearance.   HENT:      Head: Normocephalic and atraumatic.      Right Ear: Tympanic membrane and ear canal normal.      Left Ear: Tympanic membrane and ear canal normal.      Nose: Nose normal.      Mouth/Throat:      Mouth: Mucous membranes are moist.      Pharynx: Oropharynx is clear.   Eyes:      Extraocular Movements: Extraocular movements intact.      Conjunctiva/sclera: Conjunctivae normal.      Pupils: Pupils are equal, round, and reactive to light.   Cardiovascular:      Rate and Rhythm: Normal rate and regular rhythm.      Pulses: Normal pulses.           Dorsalis pedis pulses are 2+ on the right side and 2+ on the left side.        Posterior tibial pulses are 2+ on the right side and 2+ on the left side.      Heart sounds: Normal heart sounds. No murmur heard.  Pulmonary:      Effort: Pulmonary effort is normal.      Breath sounds: Normal breath sounds. No decreased breath sounds, wheezing, rhonchi or rales.   Abdominal:      General: Bowel sounds are normal.      Tenderness: There is no abdominal tenderness. There is no right CVA tenderness, left CVA tenderness, guarding or rebound.      Hernia: No hernia is present.   Musculoskeletal:      Cervical back: Normal range of motion and neck supple.      Right lower leg: No edema.      Left lower leg: No edema.      Right foot: Normal range of motion. No deformity or foot drop.      Left foot: Normal range of motion. No deformity or foot drop.   Feet:      Right foot:      Protective Sensation: 10 sites tested.  4 sites sensed.      Skin integrity: Skin integrity normal. " No ulcer, blister, skin breakdown or erythema.      Toenail Condition: Right toenails are normal.      Left foot:      Protective Sensation: 10 sites tested.  3 sites sensed.      Skin integrity: No ulcer, blister, skin breakdown or erythema.      Toenail Condition: Left toenails are normal.   Lymphadenopathy:      Head:      Right side of head: No submental, submandibular, preauricular, posterior auricular or occipital adenopathy.      Left side of head: No submental, submandibular, preauricular, posterior auricular or occipital adenopathy.      Cervical: No cervical adenopathy.      Upper Body:      Right upper body: No supraclavicular or axillary adenopathy.      Left upper body: No supraclavicular or axillary adenopathy.   Skin:     General: Skin is warm.      Capillary Refill: Capillary refill takes less than 2 seconds.      Findings: No rash.      Nails: There is no clubbing.   Neurological:      General: No focal deficit present.      Mental Status: He is alert and oriented to person, place, and time.      Sensory: Sensation is intact.      Motor: Motor function is intact.      Coordination: Coordination is intact.      Gait: Gait is intact.      Deep Tendon Reflexes: Reflexes are normal and symmetric.   Psychiatric:         Attention and Perception: Attention and perception normal.         Mood and Affect: Mood and affect normal.         Speech: Speech normal.         Behavior: Behavior normal. Behavior is cooperative.         Thought Content: Thought content normal.         Cognition and Memory: Cognition normal.         Judgment: Judgment normal.                         Assessment and Plan   Diagnoses and all orders for this visit:    1. Medicare annual wellness visit, subsequent (Primary)  -     CBC & Differential  -     Comprehensive Metabolic Panel  -     Lipid Panel  -     TSH  -     POC Urinalysis Dipstick  -     Hepatitis C Antibody    2. Type 2 diabetes mellitus with diabetic neuropathy, with  long-term current use of insulin  Assessment & Plan:  Diabetes is stable.   Continue current treatment regimen.  Diabetes will be reassessed in 6 months    Orders:  -     POC Glycosylated Hemoglobin (Hb A1C)  -     MicroAlbumin, Urine, Random - Urine, Clean Catch    3. Essential hypertension  Assessment & Plan:  Hypertension is stable, he states that BP at home has not been running high  Continue current treatment regimen.  Dietary sodium restriction.  Weight loss.  Blood pressure will be reassessed in 6 months.      4. Other hyperlipidemia  Assessment & Plan:   Lipid abnormalities are stable    Plan:  Continue same medication/s without change.      Discussed medication dosage, use, side effects, and goals of treatment in detail.    Counseled patient on lifestyle modifications to help control hyperlipidemia.     Patient Treatment Goals:   LDL goal is less than 70    Followup in 6 months.      5. Chronic obstructive pulmonary disease, unspecified COPD type  Assessment & Plan:  COPD is stable.    Plan:  Continue same medication/s without change.    Discussed medication dosage, use, side effects, and goals of treatment in detail.    Discussed monitoring symptoms and use of quick-relief medications and maintenance medication..    Patient Treatment Goals:   symptom prevention, minimizing limitation in activity, prevention of exacerbations and use of ER/inpatient care, maintenance of optimal pulmonary function, and minimization of adverse effects of treatment    Followup in 6 months.        6. Gastroesophageal reflux disease without esophagitis  Assessment & Plan:  Stable on Omeprazole.      7. Screening for prostate cancer  -     PSA Screen           I spent 30 minutes caring for Panda on this date of service. This time includes time spent by me in the following activities:preparing for the visit, performing a medically appropriate examination and/or evaluation , counseling and educating the patient/family/caregiver,  ordering medications, tests, or procedures, and documenting information in the medical record  Follow Up   Return in about 6 months (around 9/15/2024).  Patient was given instructions and counseling regarding his condition or for health maintenance advice. Please see specific information pulled into the AVS if appropriate.     The patient is advised to continue all of his regular medications as prescribed. He was counseled regarding the importance of diet, exercise and medication compliance.    The preventive exam has been reviewed in detail.  The patient has been fully counseled on preventative guidelines for vaccines, cancer screenings, and other health maintenance needs.   The patient has been counseled on guidelines for maintaining a lifestyle to promote good health and to minimize chronic diseases.  The patient has been assisted with scheduling these healthcare procedures for the coming year and given a written document of health maintenance and anticipatory guidance for age with the AVS.      This document has been electronically signed by Alberto Green MD  March 18, 2024 20:56 EDT

## 2024-03-15 NOTE — PATIENT INSTRUCTIONS
Health Maintenance, Male  Adopting a healthy lifestyle and getting preventive care are important in promoting health and wellness. Ask your health care provider about:  The right schedule for you to have regular tests and exams.  Things you can do on your own to prevent diseases and keep yourself healthy.  What should I know about diet, weight, and exercise?  Eat a healthy diet    Eat a diet that includes plenty of vegetables, fruits, low-fat dairy products, and lean protein.  Do not eat a lot of foods that are high in solid fats, added sugars, or sodium.  Maintain a healthy weight  Body mass index (BMI) is a measurement that can be used to identify possible weight problems. It estimates body fat based on height and weight. Your health care provider can help determine your BMI and help you achieve or maintain a healthy weight.  Get regular exercise  Get regular exercise. This is one of the most important things you can do for your health. Most adults should:  Exercise for at least 150 minutes each week. The exercise should increase your heart rate and make you sweat (moderate-intensity exercise).  Do strengthening exercises at least twice a week. This is in addition to the moderate-intensity exercise.  Spend less time sitting. Even light physical activity can be beneficial.  Watch cholesterol and blood lipids  Have your blood tested for lipids and cholesterol at 20 years of age, then have this test every 5 years.  You may need to have your cholesterol levels checked more often if:  Your lipid or cholesterol levels are high.  You are older than 40 years of age.  You are at high risk for heart disease.  What should I know about cancer screening?  Many types of cancers can be detected early and may often be prevented. Depending on your health history and family history, you may need to have cancer screening at various ages. This may include screening for:  Colorectal cancer.  Prostate cancer.  Skin cancer.  Lung  cancer.  What should I know about heart disease, diabetes, and high blood pressure?  Blood pressure and heart disease  High blood pressure causes heart disease and increases the risk of stroke. This is more likely to develop in people who have high blood pressure readings or are overweight.  Talk with your health care provider about your target blood pressure readings.  Have your blood pressure checked:  Every 3-5 years if you are 18-39 years of age.  Every year if you are 40 years old or older.  If you are between the ages of 65 and 75 and are a current or former smoker, ask your health care provider if you should have a one-time screening for abdominal aortic aneurysm (AAA).  Diabetes  Have regular diabetes screenings. This checks your fasting blood sugar level. Have the screening done:  Once every three years after age 45 if you are at a normal weight and have a low risk for diabetes.  More often and at a younger age if you are overweight or have a high risk for diabetes.  What should I know about preventing infection?  Hepatitis B  If you have a higher risk for hepatitis B, you should be screened for this virus. Talk with your health care provider to find out if you are at risk for hepatitis B infection.  Hepatitis C  Blood testing is recommended for:  Everyone born from 1945 through 1965.  Anyone with known risk factors for hepatitis C.  Sexually transmitted infections (STIs)  You should be screened each year for STIs, including gonorrhea and chlamydia, if:  You are sexually active and are younger than 24 years of age.  You are older than 24 years of age and your health care provider tells you that you are at risk for this type of infection.  Your sexual activity has changed since you were last screened, and you are at increased risk for chlamydia or gonorrhea. Ask your health care provider if you are at risk.  Ask your health care provider about whether you are at high risk for HIV. Your health care provider  may recommend a prescription medicine to help prevent HIV infection. If you choose to take medicine to prevent HIV, you should first get tested for HIV. You should then be tested every 3 months for as long as you are taking the medicine.  Follow these instructions at home:  Alcohol use  Do not drink alcohol if your health care provider tells you not to drink.  If you drink alcohol:  Limit how much you have to 0-2 drinks a day.  Know how much alcohol is in your drink. In the [place] one drink equals one 12 oz bottle of beer (355 mL), one 5 oz glass of wine (148 mL), or one 1½ oz glass of hard liquor (44 mL).  Lifestyle  Do not use any products that contain nicotine or tobacco. These products include cigarettes, chewing tobacco, and vaping devices, such as e-cigarettes. If you need help quitting, ask your health care provider.  Do not use street drugs.  Do not share needles.  Ask your health care provider for help if you need support or information about quitting drugs.  General instructions  Schedule regular health, dental, and eye exams.  Stay current with your vaccines.  Tell your health care provider if:  You often feel depressed.  You have ever been abused or do not feel safe at home.  Summary  Adopting a healthy lifestyle and getting preventive care are important in promoting health and wellness.  Follow your health care provider's instructions about healthy diet, exercising, and getting tested or screened for diseases.  Follow your health care provider's instructions on monitoring your cholesterol and blood pressure.  This information is not intended to replace advice given to you by your health care provider. Make sure you discuss any questions you have with your health care provider.  Document Revised: 05/09/2022 Document Reviewed: 05/09/2022  Elsevier Patient Education © 2023 [employer]  Type 2 Diabetes Mellitus, Diagnosis, Adult  Type 2 diabetes (type 2 diabetes mellitus) is a long-term, or chronic,  disease. In type 2 diabetes, one or both of these problems may be present:  The pancreas does not make enough of a hormone called insulin.  Cells in the body do not respond properly to the insulin that the body makes (insulin resistance).  Normally, insulin allows blood sugar (glucose) to enter cells in the body. The cells use glucose for energy. Insulin resistance or lack of insulin causes excess glucose to build up in the blood instead of going into cells. This causes high blood glucose (hyperglycemia).   What are the causes?  The exact cause of type 2 diabetes is not known.  What increases the risk?  The following factors may make you more likely to develop this condition:  Having a family member with type 2 diabetes.  Being overweight or obese.  Being inactive (sedentary).  Having been diagnosed with insulin resistance.  Having a history of prediabetes, diabetes when you were pregnant (gestational diabetes), or polycystic ovary syndrome (PCOS).  What are the signs or symptoms?  In the early stage of this condition, you may not have symptoms. Symptoms develop slowly and may include:  Increased thirst or hunger.  Increased urination.  Unexplained weight loss.  Tiredness (fatigue) or weakness.  Vision changes, such as blurry vision.  Dark patches on the skin.  How is this diagnosed?  This condition is diagnosed based on your symptoms, your medical history, a physical exam, and your blood glucose level. Your blood glucose may be checked with one or more of the following blood tests:  A fasting blood glucose (FBG) test. You will not be allowed to eat (you will fast) for 8 hours or longer before a blood sample is taken.  A random blood glucose test. This test checks blood glucose at any time of day regardless of when you ate.  An A1C (hemoglobin A1C) blood test. This test provides information about blood glucose levels over the previous 2-3 months.  An oral glucose tolerance test (OGTT). This test measures your blood  glucose at two times:  After fasting. This is your baseline blood glucose level.  Two hours after drinking a beverage that contains glucose.  You may be diagnosed with type 2 diabetes if:  Your fasting blood glucose level is 126 mg/dL (7.0 mmol/L) or higher.  Your random blood glucose level is 200 mg/dL (11.1 mmol/L) or higher.  Your A1C level is 6.5% or higher.  Your oral glucose tolerance test result is higher than 200 mg/dL (11.1 mmol/L).  These blood tests may be repeated to confirm your diagnosis.  How is this treated?  Your treatment may be managed by a specialist called an endocrinologist. Type 2 diabetes may be treated by following instructions from your health care provider about:  Making dietary and lifestyle changes. These may include:  Following a personalized nutrition plan that is developed by a registered dietitian.  Exercising regularly.  Finding ways to manage stress.  Checking your blood glucose level as often as told.  Taking diabetes medicines or insulin daily. This helps to keep your blood glucose levels in the healthy range.  Taking medicines to help prevent complications from diabetes. Medicines may include:  Aspirin.  Medicine to lower cholesterol.  Medicine to control blood pressure.  Your health care provider will set treatment goals for you. Your goals will be based on your age, other medical conditions you have, and how you respond to diabetes treatment. Generally, the goal of treatment is to maintain the following blood glucose levels:  Before meals:  mg/dL (4.4-7.2 mmol/L).  After meals: below 180 mg/dL (10 mmol/L).  A1C level: less than 7%.  Follow these instructions at home:  Questions to ask your health care provider  Consider asking the following questions:  Should I meet with a certified diabetes care and ?  What diabetes medicines do I need, and when should I take them?  What equipment will I need to manage my diabetes at home?  How often do I need to  check my blood glucose?  Where can I find a support group for people with diabetes?  What number can I call if I have questions?  When is my next appointment?  General instructions  Take over-the-counter and prescription medicines only as told by your health care provider.  Keep all follow-up visits. This is important.  Where to find more information  For help and guidance and for more information about diabetes, please visit:  American Diabetes Association (ADA): www.diabetes.org  American Association of Diabetes Care and Education Specialists (ADCES): www.diabeteseducator.org  International Diabetes Federation (IDF): www.idf.org  Contact a health care provider if:  Your blood glucose is at or above 240 mg/dL (13.3 mmol/L) for 2 days in a row.  You have been sick or have had a fever for 2 days or longer, and you are not getting better.  You have any of the following problems for more than 6 hours:  You cannot eat or drink.  You have nausea and vomiting.  You have diarrhea.  Get help right away if:  You have severe hypoglycemia. This means your blood glucose is lower than 54 mg/dL (3.0 mmol/L).  You become confused or you have trouble thinking clearly.  You have difficulty breathing.  You have moderate or large ketone levels in your urine.  These symptoms may represent a serious problem that is an emergency. Do not wait to see if the symptoms will go away. Get medical help right away. Call your local emergency services (911 in the [place] Do not drive yourself to the hospital.  Summary  Type 2 diabetes mellitus is a long-term, or chronic, disease. In type 2 diabetes, the pancreas does not make enough of a hormone called insulin, or cells in the body do not respond properly to insulin that the body makes.  This condition is treated by making dietary and lifestyle changes and taking diabetes medicines or insulin.  Your health care provider will set treatment goals for you. Your goals will be based on your age, other  "medical conditions you have, and how you respond to diabetes treatment.  Keep all follow-up visits. This is important.  This information is not intended to replace advice given to you by your health care provider. Make sure you discuss any questions you have with your health care provider.  Document Revised: 03/14/2022 Document Reviewed: 03/14/2022  [employer] Patient Education © 2023 [employer]  Hypertension, Adult  High blood pressure (hypertension) is when the force of blood pumping through the arteries is too strong. The arteries are the blood vessels that carry blood from the heart throughout the body. Hypertension forces the heart to work harder to pump blood and may cause arteries to become narrow or stiff. Untreated or uncontrolled hypertension can lead to a heart attack, heart failure, a stroke, kidney disease, and other problems.  A blood pressure reading consists of a higher number over a lower number. Ideally, your blood pressure should be below 120/80. The first (\"top\") number is called the systolic pressure. It is a measure of the pressure in your arteries as your heart beats. The second (\"bottom\") number is called the diastolic pressure. It is a measure of the pressure in your arteries as the heart relaxes.  What are the causes?  The exact cause of this condition is not known. There are some conditions that result in high blood pressure.  What increases the risk?  Certain factors may make you more likely to develop high blood pressure. Some of these risk factors are under your control, including:  Smoking.  Not getting enough exercise or physical activity.  Being overweight.  Having too much fat, sugar, calories, or salt (sodium) in your diet.  Drinking too much alcohol.  Other risk factors include:  Having a personal history of heart disease, diabetes, high cholesterol, or kidney disease.  Stress.  Having a family history of high blood pressure and high cholesterol.  Having obstructive sleep " apnea.  Age. The risk increases with age.  What are the signs or symptoms?  High blood pressure may not cause symptoms. Very high blood pressure (hypertensive crisis) may cause:  Headache.  Fast or irregular heartbeats (palpitations).  Shortness of breath.  Nosebleed.  Nausea and vomiting.  Vision changes.  Severe chest pain, dizziness, and seizures.  How is this diagnosed?  This condition is diagnosed by measuring your blood pressure while you are seated, with your arm resting on a flat surface, your legs uncrossed, and your feet flat on the floor. The cuff of the blood pressure monitor will be placed directly against the skin of your upper arm at the level of your heart. Blood pressure should be measured at least twice using the same arm. Certain conditions can cause a difference in blood pressure between your right and left arms.  If you have a high blood pressure reading during one visit or you have normal blood pressure with other risk factors, you may be asked to:  Return on a different day to have your blood pressure checked again.  Monitor your blood pressure at home for 1 week or longer.  If you are diagnosed with hypertension, you may have other blood or imaging tests to help your health care provider understand your overall risk for other conditions.  How is this treated?  This condition is treated by making healthy lifestyle changes, such as eating healthy foods, exercising more, and reducing your alcohol intake. You may be referred for counseling on a healthy diet and physical activity.  Your health care provider may prescribe medicine if lifestyle changes are not enough to get your blood pressure under control and if:  Your systolic blood pressure is above 130.  Your diastolic blood pressure is above 80.  Your personal target blood pressure may vary depending on your medical conditions, your age, and other factors.  Follow these instructions at home:  Eating and drinking    Eat a diet that is high in  fiber and potassium, and low in sodium, added sugar, and fat. An example of this eating plan is called the DASH diet. DASH stands for Dietary Approaches to Stop Hypertension. To eat this way:  Eat plenty of fresh fruits and vegetables. Try to fill one half of your plate at each meal with fruits and vegetables.  Eat whole grains, such as whole-wheat pasta, brown rice, or whole-grain bread. Fill about one fourth of your plate with whole grains.  Eat or drink low-fat dairy products, such as skim milk or low-fat yogurt.  Avoid fatty cuts of meat, processed or cured meats, and poultry with skin. Fill about one fourth of your plate with lean proteins, such as fish, chicken without skin, beans, eggs, or tofu.  Avoid pre-made and processed foods. These tend to be higher in sodium, added sugar, and fat.  Reduce your daily sodium intake. Many people with hypertension should eat less than 1,500 mg of sodium a day.  Do not drink alcohol if:  Your health care provider tells you not to drink.  You are pregnant, may be pregnant, or are planning to become pregnant.  If you drink alcohol:  Limit how much you have to:  0-1 drink a day for women.  0-2 drinks a day for men.  Know how much alcohol is in your drink. In the [place] one drink equals one 12 oz bottle of beer (355 mL), one 5 oz glass of wine (148 mL), or one 1½ oz glass of hard liquor (44 mL).  Lifestyle    Work with your health care provider to maintain a healthy body weight or to lose weight. Ask what an ideal weight is for you.  Get at least 30 minutes of exercise that causes your heart to beat faster (aerobic exercise) most days of the week. Activities may include walking, swimming, or biking.  Include exercise to strengthen your muscles (resistance exercise), such as Pilates or lifting weights, as part of your weekly exercise routine. Try to do these types of exercises for 30 minutes at least 3 days a week.  Do not use any products that contain nicotine or tobacco. These  products include cigarettes, chewing tobacco, and vaping devices, such as e-cigarettes. If you need help quitting, ask your health care provider.  Monitor your blood pressure at home as told by your health care provider.  Keep all follow-up visits. This is important.  Medicines  Take over-the-counter and prescription medicines only as told by your health care provider. Follow directions carefully. Blood pressure medicines must be taken as prescribed.  Do not skip doses of blood pressure medicine. Doing this puts you at risk for problems and can make the medicine less effective.  Ask your health care provider about side effects or reactions to medicines that you should watch for.  Contact a health care provider if you:  Think you are having a reaction to a medicine you are taking.  Have headaches that keep coming back (recurring).  Feel dizzy.  Have swelling in your ankles.  Have trouble with your vision.  Get help right away if you:  Develop a severe headache or confusion.  Have unusual weakness or numbness.  Feel faint.  Have severe pain in your chest or abdomen.  Vomit repeatedly.  Have trouble breathing.  These symptoms may be an emergency. Get help right away. Call 911.  Do not wait to see if the symptoms will go away.  Do not drive yourself to the hospital.  Summary  Hypertension is when the force of blood pumping through your arteries is too strong. If this condition is not controlled, it may put you at risk for serious complications.  Your personal target blood pressure may vary depending on your medical conditions, your age, and other factors. For most people, a normal blood pressure is less than 120/80.  Hypertension is treated with lifestyle changes, medicines, or a combination of both. Lifestyle changes include losing weight, eating a healthy, low-sodium diet, exercising more, and limiting alcohol.  This information is not intended to replace advice given to you by your health care provider. Make sure you  discuss any questions you have with your health care provider.  Document Revised: 10/25/2022 Document Reviewed: 10/25/2022  Elsevier Patient Education © 2023 [employer]  Dyslipidemia  Dyslipidemia is an imbalance of waxy, fat-like substances (lipids) in the blood. The body needs lipids in small amounts. Dyslipidemia often involves a high level of cholesterol or triglycerides, which are types of lipids.  Common forms of dyslipidemia include:  High levels of LDL cholesterol. LDL is the type of cholesterol that causes fatty deposits (plaques) to build up in the blood vessels that carry blood away from the heart (arteries).  Low levels of HDL cholesterol. HDL cholesterol is the type of cholesterol that protects against heart disease. High levels of HDL remove the LDL buildup from arteries.  High levels of triglycerides. Triglycerides are a fatty substance in the blood that is linked to a buildup of plaques in the arteries.  What are the causes?  There are two main types of dyslipidemia: primary and secondary. Primary dyslipidemia is caused by changes (mutations) in genes that are passed down through families (inherited). These mutations cause several types of dyslipidemia.  Secondary dyslipidemia may be caused by various risk factors that can lead to the disease, such as lifestyle choices and certain medical conditions.  What increases the risk?  You are more likely to develop this condition if you are an older man or if you are a woman who has gone through menopause. Other risk factors include:  Having a family history of dyslipidemia.  Taking certain medicines, including birth control pills, steroids, some diuretics, and beta-blockers.  Eating a diet high in saturated fat.  Smoking cigarettes or excessive alcohol intake.  Having certain medical conditions such as diabetes, polycystic ovary syndrome (PCOS), kidney disease, liver disease, or hypothyroidism.  Not exercising regularly.  Being overweight or obese with  too much belly fat.  What are the signs or symptoms?  In most cases, dyslipidemia does not usually cause any symptoms.  In severe cases, very high lipid levels can cause:  Fatty bumps under the skin (xanthomas).  A white or gray ring around the black center (pupil) of the eye.  Very high triglyceride levels can cause inflammation of the pancreas (pancreatitis).  How is this diagnosed?  Your health care provider may diagnose dyslipidemia based on a routine blood test (fasting blood test). Because most people do not have symptoms of the condition, this blood testing (lipid profile) is done on adults age 20 and older and is repeated every 4-6 years. This test checks:  Total cholesterol. This measures the total amount of cholesterol in your blood, including LDL cholesterol, HDL cholesterol, and triglycerides. A healthy number is below 200 mg/dL (5.17 mmol/L).  LDL cholesterol. The target number for LDL cholesterol is different for each person, depending on individual risk factors. A healthy number is usually below 100 mg/dL (2.59 mmol/L). Ask your health care provider what your LDL cholesterol should be.  HDL cholesterol. An HDL level of 60 mg/dL (1.55 mmol/L) or higher is best because it helps to protect against heart disease. A number below 40 mg/dL (1.03 mmol/L) for men or below 50 mg/dL (1.29 mmol/L) for women increases the risk for heart disease.  Triglycerides. A healthy triglyceride number is below 150 mg/dL (1.69 mmol/L).  If your lipid profile is abnormal, your health care provider may do other blood tests.  How is this treated?  Treatment depends on the type of dyslipidemia that you have and your other risk factors for heart disease and stroke. Your health care provider will have a target range for your lipid levels based on this information.  Treatment for dyslipidemia starts with lifestyle changes, such as diet and exercise. Your health care provider may recommend that you:  Get regular exercise.  Make  changes to your diet.  Quit smoking if you smoke.  Limit your alcohol intake.  If diet changes and exercise do not help you reach your goals, your health care provider may also prescribe medicine to lower lipids. The most commonly prescribed type of medicine lowers your LDL cholesterol (statin drug). If you have a high triglyceride level, your provider may prescribe another type of drug (fibrate) or an omega-3 fish oil supplement, or both.  Follow these instructions at home:  Eating and drinking    Follow instructions from your health care provider or dietitian about eating or drinking restrictions.  Eat a healthy diet as told by your health care provider. This can help you reach and maintain a healthy weight, lower your LDL cholesterol, and raise your HDL cholesterol. This may include:  Limiting your calories, if you are overweight.  Eating more fruits, vegetables, whole grains, fish, and lean meats.  Limiting saturated fat, trans fat, and cholesterol.  Do not drink alcohol if:  Your health care provider tells you not to drink.  You are pregnant, may be pregnant, or are planning to become pregnant.  If you drink alcohol:  Limit how much you have to:  0-1 drink a day for women.  0-2 drinks a day for men.  Know how much alcohol is in your drink. In the [place] one drink equals one 12 oz bottle of beer (355 mL), one 5 oz glass of wine (148 mL), or one 1½ oz glass of hard liquor (44 mL).  Activity  Get regular exercise. Start an exercise and strength training program as told by your health care provider. Ask your health care provider what activities are safe for you. Your health care provider may recommend:  30 minutes of aerobic activity 4-6 days a week. Brisk walking is an example of aerobic activity.  Strength training 2 days a week.  General instructions  Do not use any products that contain nicotine or tobacco. These products include cigarettes, chewing tobacco, and vaping devices, such as e-cigarettes. If you need  help quitting, ask your health care provider.  Take over-the-counter and prescription medicines only as told by your health care provider. This includes supplements.  Keep all follow-up visits. This is important.  Contact a health care provider if:  You are having trouble sticking to your exercise or diet plan.  You are struggling to quit smoking or to control your use of alcohol.  Summary  Dyslipidemia often involves a high level of cholesterol or triglycerides, which are types of lipids.  Treatment depends on the type of dyslipidemia that you have and your other risk factors for heart disease and stroke.  Treatment for dyslipidemia starts with lifestyle changes, such as diet and exercise.  Your health care provider may prescribe medicine to lower lipids.  This information is not intended to replace advice given to you by your health care provider. Make sure you discuss any questions you have with your health care provider.  Document Revised: 07/21/2023 Document Reviewed: 02/21/2022  Elsevier Patient Education © 2023 [employer] Inc.

## 2024-03-16 LAB
ALBUMIN UR-MCNC: 7.5 MG/DL
PSA SERPL-MCNC: 0.43 NG/ML (ref 0–4)

## 2024-03-18 ENCOUNTER — TELEPHONE (OUTPATIENT)
Dept: CARDIOLOGY | Facility: CLINIC | Age: 74
End: 2024-03-18
Payer: MEDICARE

## 2024-03-18 NOTE — TELEPHONE ENCOUNTER
Patient called to report that patient's BP has continued be high. Patient reports BP has been 180-190s/80s 2 hours after taking medications. HR 50s. Patient states his swelling in LE has improved and he has been wearing compression stockings.     At last appointment, Entresto increased at last visit to 49-51 mg     Please advise! Thanks,   Renae VALERO

## 2024-03-19 NOTE — ASSESSMENT & PLAN NOTE
Hypertension is stable, he states that BP at home has not been running high  Continue current treatment regimen.  Dietary sodium restriction.  Weight loss.  Blood pressure will be reassessed in 6 months.

## 2024-03-19 NOTE — ASSESSMENT & PLAN NOTE
COPD is stable.    Plan:  Continue same medication/s without change.    Discussed medication dosage, use, side effects, and goals of treatment in detail.    Discussed monitoring symptoms and use of quick-relief medications and maintenance medication..    Patient Treatment Goals:   symptom prevention, minimizing limitation in activity, prevention of exacerbations and use of ER/inpatient care, maintenance of optimal pulmonary function, and minimization of adverse effects of treatment    Followup in 6 months.

## 2024-03-25 ENCOUNTER — TELEPHONE (OUTPATIENT)
Dept: CARDIOLOGY | Facility: CLINIC | Age: 74
End: 2024-03-25
Payer: MEDICARE

## 2024-03-25 RX ORDER — HYDRALAZINE HYDROCHLORIDE 50 MG/1
50 TABLET, FILM COATED ORAL 3 TIMES DAILY
Qty: 270 TABLET | Refills: 2 | Status: SHIPPED | OUTPATIENT
Start: 2024-03-25

## 2024-03-25 NOTE — TELEPHONE ENCOUNTER
Caller: Elaine Panda Dale    Relationship: Self    Best call back number: [phone]    What test was performed: ECHO    When was the test performed: 03/09/24    Where was the test performed: [facility]     Additional notes: PATIENT WOULD LIKE A CALL TO DISCUSS ECHO RESULTS.

## 2024-03-25 NOTE — TELEPHONE ENCOUNTER
Caller: Panda Henry    Relationship: Self    Best call back number: [phone]    What is the best time to reach you: ANYTIME     Who are you requesting to speak with (clinical staff, provider,  specific staff member): CLINICAL     What was the call regarding: PATIENT CALLED IN REQUESTING A REFILL OF THE HYDRALAZINE 50 MG, HE TAKES THIS MEDICATION 3 TIMES A DAY. PATIENT STATED WHEN DR. MISHRA RAISED THE DOSE OF THE MEDICATION HE IS NOW RUNNING OUT AND NEEDS THE PRESCRIPTION REWROTE FOR THE CORRECT DOSE.     PATIENT STATED HIS TOP NUMBER OF HIS BLOOD PRESSURE HAS STILL BEEN STAYING IN THE 170s-180s AND HAS NOT CAME DOWN MUCH AT ALL.     COULD NOT DO MEDICATION REFILL DUE TO MG OF THE MEDICATION BEING IN SYSTEM AS 35 MG     Is it okay if the provider responds through MyChart: PREFERS A CALL     PATIENT HAS ENOUGH MEDICATION TO LAST HIM UNTIL 03/27/24, 2 DAYS REMAINING

## 2024-03-25 NOTE — TELEPHONE ENCOUNTER
Patient called back again today to report his BP has remained 170/180 systolic and HR 50-60s after increasing hydralazine dose.    Please advise! Thanks,   Renae VALERO

## 2024-03-26 ENCOUNTER — TELEPHONE (OUTPATIENT)
Dept: FAMILY MEDICINE CLINIC | Facility: CLINIC | Age: 74
End: 2024-03-26
Payer: MEDICARE

## 2024-03-28 DIAGNOSIS — R30.0 DYSURIA: ICD-10-CM

## 2024-03-28 DIAGNOSIS — R35.0 BENIGN PROSTATIC HYPERPLASIA WITH URINARY FREQUENCY: Primary | ICD-10-CM

## 2024-03-28 DIAGNOSIS — N40.1 BENIGN PROSTATIC HYPERPLASIA WITH URINARY FREQUENCY: Primary | ICD-10-CM

## 2024-03-28 NOTE — TELEPHONE ENCOUNTER
PATIENT CALLED DR MURPHY BACK TO GO OVER HIS LAB RESULTS. PLEASE RETURN HIS CALL [phone].  THANK YOU.
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
## 2024-05-07 ENCOUNTER — TELEPHONE (OUTPATIENT)
Dept: FAMILY MEDICINE CLINIC | Facility: CLINIC | Age: 74
End: 2024-05-07
Payer: MEDICARE

## 2024-05-20 PROBLEM — E78.5 DYSLIPIDEMIA: Status: ACTIVE | Noted: 2024-05-20

## 2024-05-20 NOTE — PROGRESS NOTES
[facility] Cardiology    Encounter Date: 2024    Patient ID: Panda Henry is a 74 y.o. male.  : 1950     PCP: Alberto Green MD       Chief Complaint: Coronary Artery Disease, Shortness of Breath, and Hypertension      PROBLEM LIST:  Coronary artery disease  Abnormal stress test followed by left heart catheterization with stent placement to the LAD 2019-data deficit ([facility])  CCS class I-II atypical chest discomfort/NYHA class I-II dyspnea on exertion symptoms  Cardiac PET 8/3/2020: Acceptable negative IV Lexiscan hybrid PET cardiac CT stress scan studies suggestive of low probability for significant focal obstructive CAD with preserved systolic LV function (LVEF 0.54)  Echo, 2022; EF 56-60%. Mild concentric LVH. Grade I diastolic dysfunction with high LAP. RA cavity is mildly dilated.  Lexiscan, 10/25/2022; EF 50%. Scintigraphy demonstrates a fixed inferior wall defect compatible with infarct or diaphragmatic attenuation. Additionally there is a moderately sized, moderately dense but incompletely reversible inferolateral wall defect compatible with ischemia. Elevated transient ischemic dilation ratio 1.26 possibly representing multivessel coronary disease. No evidence of increased lung uptake of radiopharmaceutical.  [facility], 2022; EF 65%. 75 to 80% stenosis of the ostial/proximal LAD with abnormal IFR.  This vessel is now status post successful stenting with 3.5 x 28 mm JOSE E reducing the stenosis to no significant residual disease and improvement of IFR. Patent stent of mid LAD with nonobstructive 20 to 30% plaque. 90% stenosis of the ostial/proximal ramus intermedius.  This vessel is now status post JOSE E with 2.25 x 28 mm JOSE E reducing the stenosis to 0%. No significant disease of the codominant left circumflex coronary artery and the codominant right coronary artery.   30 day MCOT, 2022; SR. Rare APCs and PVCs. No significant  abnormalities.   Echo, 03/08/2024: EF 56-60%. Trace MR. Richard TR.   Lower extremity weakness  Hypertension  Hyperlipidemia  Type 2 diabetes mellitus; hemoglobin A1c 7.9% August 2020  GERD  Obstructive sleep apnea, compliant with CPAP  Orthostatic dizziness  Mild obesity: BMI 30.37  Depression  COPD  Chronic back pain  Smokeless tobacco use; 1 stick every 3 days  History of basal cell carcinoma  History of rheumatic fever  Surgical history:  Blepharoplasty bilateral  LHC  Right rotator cuff repair  Sinus plasty  Basal cell carcinoma skin excision    History of Present Illness  Patient presents today for a 3 month follow-up with a history of CAD and cardiac risk factors. Since last visit, patient's blood pressure medications were adjusted however states that he did not receive his higher dose of Entresto until a couple of days ago and has noted a slight decline in his blood pressure since the dose was increased.  He is trying to remain active and busy, experiences shortness of breath with moderate exertion.  No edema orthopnea PND palpitations or syncope.  No current chest pain.  Blood pressure records from home, this has fluctuated anywhere from 140s to 200 averaging 1 60-1 70 systolic.    Allergies   Allergen Reactions    Contrast Dye (Echo Or Unknown Ct/Mr) Headache     Had a headache for 9 days     Dust Mite Extract Headache    Grass Headache    Molds & Smuts Headache    Pollen Extract Headache         Current Outpatient Medications:     albuterol sulfate  (90 Base) MCG/ACT inhaler, Inhale 2 puffs Every 4 (Four) Hours As Needed for Wheezing., Disp: , Rfl:     aspirin 81 MG EC tablet, Take 1 tablet by mouth Daily., Disp: , Rfl:     budesonide-formoterol (SYMBICORT) 160-4.5 MCG/ACT inhaler, INHALE 2 PUFFS TWICE A DAY FOR 30 DAYS, Disp: 10.2 each, Rfl: 2    carboxymethylcellulose (REFRESH PLUS) 0.5 % solution, 3 (Three) Times a Day As Needed for Dry Eyes., Disp: , Rfl:     carvedilol (COREG) 25 MG  tablet, Take 1 tablet by mouth 2 (Two) Times a Day With Meals., Disp: 180 tablet, Rfl: 3    clopidogrel (PLAVIX) 75 MG tablet, TAKE 1 TABLET BY MOUTH EVERY DAY, Disp: 90 tablet, Rfl: 3    doxepin (SINEquan) 100 MG capsule, Take 1 capsule by mouth every night at bedtime., Disp: , Rfl:     escitalopram (LEXAPRO) 20 MG tablet, TAKE 2 & 1/2 TABLETS BY MOUTH DAILY 90 DAYS, Disp: 225 tablet, Rfl: 0    ezetimibe (ZETIA) 10 MG tablet, Take 1 tablet by mouth Daily., Disp: , Rfl:     gabapentin (NEURONTIN) 400 MG capsule, Take 1 capsule by mouth 3 (Three) Times a Day., Disp: , Rfl:     Gel Base gel, mannitol 20%, capsaicin 0.001%, lidocaine 10%, prilocaine 2% cream, apply cream to affected areas every Q4-6 hoursPRN, Disp: 240 g, Rfl: 5    glucose 4-6 GM-MG per chewable tablet, Chew 2 tablets As Needed for Low Blood Sugar., Disp: , Rfl:     hydrALAZINE (APRESOLINE) 50 MG tablet, Take 1 tablet by mouth 3 (Three) Times a Day., Disp: 270 tablet, Rfl: 2    hydrOXYzine (ATARAX) 25 MG tablet, Take 1 tablet by mouth 1 (One) Time As Needed for Anxiety., Disp: , Rfl:     insulin aspart (novoLOG FLEXPEN) 100 UNIT/ML solution pen-injector sc pen, Inject 14 Units under the skin into the appropriate area as directed 2 (Two) Times a Day With Meals. 16 units in the am, 14 units in the Pm, Disp: , Rfl:     insulin glargine (LANTUS, SEMGLEE) 100 UNIT/ML injection, Inject 34 Units under the skin into the appropriate area as directed Daily. Recently changed to 34u in AM, Disp: , Rfl:     loratadine (CLARITIN) 10 MG tablet, Take 1 tablet by mouth Daily., Disp: , Rfl:     LORazepam (ATIVAN) 2 MG tablet, Take 1 tablet by mouth 2 (Two) Times a Day., Disp: , Rfl:     lovastatin (MEVACOR) 40 MG tablet, Take 1 tablet by mouth Every Night for 30 days., Disp: 30 tablet, Rfl: 2    montelukast (SINGULAIR) 10 MG tablet, Take 1 tablet by mouth Every Night., Disp: , Rfl:     nitroglycerin (NITROSTAT) 0.4 MG SL tablet, Place 1 tablet under the tongue Every 5  "(Five) Minutes As Needed for Chest Pain. Take no more than 3 doses in 15 minutes., Disp: , Rfl:     omeprazole (priLOSEC) 40 MG capsule, TAKE 1 CAPSULE BY MOUTH EVERY DAY 30 MINUTES BEFORE MORNING MEAL, Disp: , Rfl:     sacubitril-valsartan (ENTRESTO)  MG tablet, Take 1 tablet by mouth 2 (Two) Times a Day., Disp: 90 tablet, Rfl: 3    salsalate (DISALCID) 750 MG tablet, Take 1 tablet by mouth 2 (Two) Times a Day., Disp: , Rfl:     tamsulosin (FLOMAX) 0.4 MG capsule 24 hr capsule, Take 1 capsule by mouth Daily., Disp: , Rfl:     dapagliflozin Propanediol (Farxiga) 10 MG tablet, Take 10 mg by mouth Daily., Disp: 30 tablet, Rfl: 5    tadalafil (Cialis) 10 MG tablet, Take 1 tablet by mouth Daily As Needed for Erectile Dysfunction for up to 10 days., Disp: 10 tablet, Rfl: 2    The following portions of the patient's history were reviewed and updated as appropriate: allergies, current medications, past family history, past medical history, past social history, past surgical history and problem list.    ROS  Review of Systems   14 point ROS negative except for that listed in the HPI.         Objective:     /90 (BP Location: Right arm, Patient Position: Sitting)   Pulse 68   Ht 188 cm (74\")   Wt 106 kg (233 lb)   SpO2 96%   BMI 29.92 kg/m²      Physical Exam  Constitutional: Patient appears well-developed and well-nourished.   HENT: HEENT exam unremarkable.   Neck: Neck supple. No JVD present. No carotid bruits.   Cardiovascular: Normal rate, regular rhythm and normal heart sounds. No murmur heard.   2+ symmetric pulses.   Pulmonary/Chest: Breath sounds normal. Does not exhibit tenderness.   Abdominal: Abdomen benign.   Musculoskeletal: Does not exhibit edema.   Neurological: Neurological exam unremarkable.   Vitals reviewed.    Data Review:   Lab Results   Component Value Date    GLUCOSE 161 (H) 03/15/2024    BUN 9 03/15/2024    CREATININE 0.77 03/15/2024    EGFR 94.5 03/15/2024    BCR 11.7 03/15/2024    NA " 140 03/15/2024    K 3.1 (L) 03/15/2024    CO2 26.3 03/15/2024    CALCIUM 8.7 03/15/2024    ALBUMIN 4.4 03/15/2024    AST 11 03/15/2024    ALT 15 03/15/2024     Lab Results   Component Value Date    CHOL 152 03/15/2024    TRIG 96 03/15/2024    HDL 47 03/15/2024    LDL 87 03/15/2024      Lab Results   Component Value Date    WBC 7.05 03/15/2024    RBC 5.46 03/15/2024    HGB 16.1 03/15/2024    HCT 47.7 03/15/2024    MCV 87.4 03/15/2024     03/15/2024     Lab Results   Component Value Date    TSH 4.010 03/15/2024     Lab Results   Component Value Date    HGBA1C 7.7 (A) 03/15/2024        Procedures       Advance Care Planning   ACP discussion was declined by the patient. Patient does not have an advance directive, declines further assistance.           Assessment:      Diagnosis Plan   1. Coronary artery disease involving native coronary artery of native heart without angina pectoris  No angina or CHF decompensation.  Continue current medical regimen including therapy and current GDMT.      2. Essential hypertension  Entresto increased to higher dose, continue rest of the current antihypertensive therapy, add Farxiga 10 mg daily, hopefully with diuresis his blood pressure control will improve.      3. Dyslipidemia  Continue Zetia.        Plan:   Stable cardiac status.  No angina, gets short of breath with exertion probably due to uncontrolled blood pressure suspect HFpEF.  Add Farxiga 10 mg daily, monitor blood pressure at home, goal of 140 systolic or less explained.  Patient advised to contact us if blood pressure is not at goal and we will further adjust medications.  Continue rest of the current medications.   FU in 3 MO, sooner as needed.  Thank you for allowing us to participate in the care of your patient.     Moisés Bansal MD, FACC, [facility]      Please note that portions of this note may have been completed with a voice recognition program. Efforts were made to edit the dictations, but occasionally words are  mistranscribed.

## 2024-05-28 ENCOUNTER — OFFICE VISIT (OUTPATIENT)
Dept: CARDIOLOGY | Facility: CLINIC | Age: 74
End: 2024-05-28
Payer: MEDICARE

## 2024-05-28 VITALS
BODY MASS INDEX: 29.9 KG/M2 | HEART RATE: 68 BPM | SYSTOLIC BLOOD PRESSURE: 168 MMHG | HEIGHT: 74 IN | WEIGHT: 233 LBS | OXYGEN SATURATION: 96 % | DIASTOLIC BLOOD PRESSURE: 90 MMHG

## 2024-05-28 DIAGNOSIS — E78.5 DYSLIPIDEMIA: ICD-10-CM

## 2024-05-28 DIAGNOSIS — I25.10 CORONARY ARTERY DISEASE INVOLVING NATIVE CORONARY ARTERY OF NATIVE HEART WITHOUT ANGINA PECTORIS: Primary | ICD-10-CM

## 2024-05-28 DIAGNOSIS — I10 ESSENTIAL HYPERTENSION: ICD-10-CM

## 2024-05-28 PROCEDURE — 99214 OFFICE O/P EST MOD 30 MIN: CPT | Performed by: INTERNAL MEDICINE

## 2024-05-28 PROCEDURE — 3080F DIAST BP >= 90 MM HG: CPT | Performed by: INTERNAL MEDICINE

## 2024-05-28 PROCEDURE — 3077F SYST BP >= 140 MM HG: CPT | Performed by: INTERNAL MEDICINE

## 2024-05-28 PROCEDURE — 1160F RVW MEDS BY RX/DR IN RCRD: CPT | Performed by: INTERNAL MEDICINE

## 2024-05-28 PROCEDURE — 1159F MED LIST DOCD IN RCRD: CPT | Performed by: INTERNAL MEDICINE

## 2024-05-28 RX ORDER — DAPAGLIFLOZIN 10 MG/1
1 TABLET, FILM COATED ORAL DAILY
Qty: 30 TABLET | Refills: 5 | Status: SHIPPED | OUTPATIENT
Start: 2024-05-28

## 2024-06-07 ENCOUNTER — TELEPHONE (OUTPATIENT)
Dept: FAMILY MEDICINE CLINIC | Facility: CLINIC | Age: 74
End: 2024-06-07

## 2024-06-07 NOTE — TELEPHONE ENCOUNTER
Caller: Panda Neumann    Relationship to patient: NURSE AT [facility]    Best call back number:  [phone]     Chief complaint: NEEDS HOSPITAL FOLLOW UP    Type of visit: HOSPITAL FOLLOW UP    Requested date: WITHIN NEXT 5 DAYS     If rescheduling, when is the original appointment: N/A     Additional notes: BARTOLOME WITH [facility] CALLED TO SCHEDULED AN APPOINTMENT FOR PATIENT.   HE WAS ADMITTED FOR [facility] AND DISCHARGED FROM THERE ON 6/6/24.     I COULD NOT SCHEDULE DUE TO AN ERROR MESSAGE STATING PATIENT'S INSURANCE IS OUT OF NETWORK.    I ADVISED BARTOLOME TO HAVE PATIENT CALL US TO VERIFY INSURANCE AND TO SCHEDULED.    CAN YOU PLEASE FOLLOW UP WITH PATIENT AT THE NUMBER ABOVE?    THANK YOU

## 2024-06-11 ENCOUNTER — TELEPHONE (OUTPATIENT)
Dept: CARDIOLOGY | Facility: CLINIC | Age: 74
End: 2024-06-11
Payer: MEDICARE

## 2024-06-11 ENCOUNTER — OFFICE VISIT (OUTPATIENT)
Dept: FAMILY MEDICINE CLINIC | Facility: CLINIC | Age: 74
End: 2024-06-11
Payer: MEDICARE

## 2024-06-11 VITALS
RESPIRATION RATE: 18 BRPM | TEMPERATURE: 97.5 F | BODY MASS INDEX: 29.9 KG/M2 | OXYGEN SATURATION: 98 % | HEIGHT: 74 IN | WEIGHT: 233 LBS | SYSTOLIC BLOOD PRESSURE: 155 MMHG | DIASTOLIC BLOOD PRESSURE: 80 MMHG | HEART RATE: 68 BPM

## 2024-06-11 DIAGNOSIS — I10 ESSENTIAL HYPERTENSION: ICD-10-CM

## 2024-06-11 DIAGNOSIS — R55 SYNCOPE, UNSPECIFIED SYNCOPE TYPE: Primary | ICD-10-CM

## 2024-06-11 RX ORDER — AMLODIPINE BESYLATE 5 MG/1
5 TABLET ORAL DAILY
Qty: 90 TABLET | Refills: 0 | Status: SHIPPED | OUTPATIENT
Start: 2024-06-11 | End: 2024-09-09

## 2024-06-11 RX ORDER — AMLODIPINE BESYLATE 5 MG/1
1 TABLET ORAL DAILY
COMMUNITY
Start: 2024-05-29 | End: 2024-06-11 | Stop reason: SDUPTHER

## 2024-06-11 RX ORDER — AMLODIPINE BESYLATE 5 MG/1
5 TABLET ORAL DAILY
Qty: 90 TABLET | Refills: 0 | Status: SHIPPED | OUTPATIENT
Start: 2024-06-11 | End: 2024-06-11

## 2024-06-11 NOTE — PROGRESS NOTES
"Chief Complaint  Hospital Follow Up Visit (Follow up from hospital 6-6-2024 blood pressure dropped)    Subjective        Panda Henry presents to [facility]  History of Present Illness  The patient is a 74 y.o. male who is here today because of loss of consciousness at home 5 days ago. He stated that he was sitting at home he felt lightheaded and he tried to walk to lay down but he did not make it and fell and loss consciousness. EMS came to his house and noted his BP to be low and was then brought to the ER. While in the ER he started feeling better and all his test were all within normal limits.      Objective   Vital Signs:  /80 (BP Location: Right arm, Patient Position: Sitting, Cuff Size: Adult)   Pulse 68   Temp 97.5 °F (36.4 °C) (Temporal)   Resp 18   Ht 188 cm (74\")   Wt 106 kg (233 lb)   SpO2 98%   BMI 29.92 kg/m²   Estimated body mass index is 29.92 kg/m² as calculated from the following:    Height as of this encounter: 188 cm (74\").    Weight as of this encounter: 106 kg (233 lb).               Physical Exam  Vitals and nursing note reviewed.   Constitutional:       General: He is not in acute distress.     Appearance: Normal appearance. He is well-developed and well-groomed.   HENT:      Head: Normocephalic and atraumatic.      Jaw: No tenderness or pain on movement.      Salivary Glands: Right salivary gland is not diffusely enlarged. Left salivary gland is not diffusely enlarged.      Right Ear: Tympanic membrane and ear canal normal.      Left Ear: Tympanic membrane and ear canal normal.      Nose: No congestion or rhinorrhea.      Mouth/Throat:      Mouth: Mucous membranes are moist.      Pharynx: No oropharyngeal exudate or posterior oropharyngeal erythema.   Eyes:      General: Lids are normal. No allergic shiner or scleral icterus.     Conjunctiva/sclera: Conjunctivae normal.      Pupils: Pupils are equal, round, and reactive to light.   Neck:      " Thyroid: No thyroid mass, thyromegaly or thyroid tenderness.      Trachea: Trachea normal.   Cardiovascular:      Rate and Rhythm: Normal rate and regular rhythm. No extrasystoles are present.     Pulses: Normal pulses.      Heart sounds: Normal heart sounds. No murmur heard.  Pulmonary:      Effort: Pulmonary effort is normal. No respiratory distress.      Breath sounds: Normal breath sounds. No decreased breath sounds, wheezing, rhonchi or rales.   Chest:   Breasts:     Right: Normal.      Left: Normal.   Abdominal:      General: Bowel sounds are normal.      Palpations: Abdomen is soft.      Tenderness: There is no abdominal tenderness. There is no right CVA tenderness, left CVA tenderness, guarding or rebound.   Musculoskeletal:      Cervical back: Neck supple.      Right lower leg: No edema.      Left lower leg: No edema.   Lymphadenopathy:      Cervical: No cervical adenopathy.      Upper Body:      Right upper body: No supraclavicular or axillary adenopathy.      Left upper body: No supraclavicular or axillary adenopathy.   Skin:     General: Skin is warm.      Nails: There is no clubbing.   Neurological:      General: No focal deficit present.      Mental Status: He is alert and oriented to person, place, and time.      Sensory: Sensation is intact.      Motor: Motor function is intact.      Coordination: Coordination is intact.   Psychiatric:         Attention and Perception: Attention and perception normal.         Mood and Affect: Mood normal.         Speech: Speech normal.         Behavior: Behavior normal. Behavior is cooperative.         Thought Content: Thought content normal.        Result Review :                     Assessment and Plan     Diagnoses and all orders for this visit:    1. Syncope, unspecified syncope type (Primary)  Comments:  ? Vasovagal    2. Essential hypertension  Assessment & Plan:  Hypertension is stable and controlled  Continue current treatment regimen.  Dietary sodium  restriction.  Blood pressure will be reassessed in 3 months.      Other orders  -     Discontinue: amLODIPine (NORVASC) 5 MG tablet; Take 1 tablet by mouth Daily for 90 days.  Dispense: 90 tablet; Refill: 0  -     amLODIPine (NORVASC) 5 MG tablet; Take 1 tablet by mouth Daily for 90 days.  Dispense: 90 tablet; Refill: 0           I spent 15 minutes caring for Panda on this date of service. This time includes time spent by me in the following activities:preparing for the visit, performing a medically appropriate examination and/or evaluation , counseling and educating the patient/family/caregiver, ordering medications, tests, or procedures, and documenting information in the medical record  Follow Up     No follow-ups on file.  Patient was given instructions and counseling regarding his condition or for health maintenance advice. Please see specific information pulled into the AVS if appropriate.     The patient is advised to continue all of his regular medications as prescribed. He was counseled regarding the importance of diet, exercise and medication compliance.    RTC as needed if symptoms worsen or fail to improve.      This document has been electronically signed by Alberto Green MD  June 14, 2024 17:43 EDT

## 2024-06-11 NOTE — TELEPHONE ENCOUNTER
Name: ZELDA EVERETT    Relationship: Emergency Contact    Best Callback Number: [phone]    Incoming call to the Hub, requesting to  Reschedule their HFU appointment on 6.14.24.     Per Hub workflow, further review is needed before the task can be completed.    Result of Call: Please reach out to the patient to reschedule

## 2024-06-14 ENCOUNTER — OFFICE VISIT (OUTPATIENT)
Dept: CARDIOLOGY | Facility: CLINIC | Age: 74
End: 2024-06-14
Payer: MEDICARE

## 2024-06-14 VITALS
OXYGEN SATURATION: 96 % | WEIGHT: 233 LBS | BODY MASS INDEX: 29.9 KG/M2 | HEIGHT: 74 IN | HEART RATE: 69 BPM | SYSTOLIC BLOOD PRESSURE: 160 MMHG | DIASTOLIC BLOOD PRESSURE: 80 MMHG

## 2024-06-14 DIAGNOSIS — I10 ESSENTIAL HYPERTENSION: ICD-10-CM

## 2024-06-14 DIAGNOSIS — R55 SYNCOPE AND COLLAPSE: Primary | ICD-10-CM

## 2024-06-14 DIAGNOSIS — I25.10 CORONARY ARTERY DISEASE INVOLVING NATIVE CORONARY ARTERY OF NATIVE HEART WITHOUT ANGINA PECTORIS: ICD-10-CM

## 2024-06-14 DIAGNOSIS — I95.1 AUTONOMIC ORTHOSTATIC HYPOTENSION: ICD-10-CM

## 2024-06-14 DIAGNOSIS — E78.5 DYSLIPIDEMIA: ICD-10-CM

## 2024-06-14 PROBLEM — R07.9 CHEST PAIN: Status: RESOLVED | Noted: 2020-08-02 | Resolved: 2024-06-14

## 2024-06-14 PROBLEM — R94.39 POSITIVE CARDIAC STRESS TEST: Status: RESOLVED | Noted: 2022-11-22 | Resolved: 2024-06-14

## 2024-06-14 PROBLEM — E78.49 OTHER HYPERLIPIDEMIA: Status: RESOLVED | Noted: 2021-09-22 | Resolved: 2024-06-14

## 2024-06-14 NOTE — PROGRESS NOTES
[facility] Cardiology    Encounter Date: 2024    Patient ID: Panda Henry is a 74 y.o. male.  : 1950     PCP: Alberto Green MD       Chief Complaint: Coronary Artery Disease      PROBLEM LIST:  Coronary artery disease  Abnormal stress test followed by left heart catheterization with stent placement to the LAD 2019-data deficit ([facility])  CCS class I-II atypical chest discomfort/NYHA class I-II dyspnea on exertion symptoms  Cardiac PET 8/3/2020: Acceptable negative IV Lexiscan hybrid PET cardiac CT stress scan studies suggestive of low probability for significant focal obstructive CAD with preserved systolic LV function (LVEF 0.54)  Echo, 2022; EF 56-60%. Mild concentric LVH. Grade I diastolic dysfunction with high LAP. RA cavity is mildly dilated.  Lexiscan, 10/25/2022; EF 50%. Scintigraphy demonstrates a fixed inferior wall defect compatible with infarct or diaphragmatic attenuation. Additionally there is a moderately sized, moderately dense but incompletely reversible inferolateral wall defect compatible with ischemia. Elevated transient ischemic dilation ratio 1.26 possibly representing multivessel coronary disease. No evidence of increased lung uptake of radiopharmaceutical.  LHC, 2022; EF 65%. 75 to 80% stenosis of the ostial/proximal LAD with abnormal IFR.  This vessel is now status post successful stenting with 3.5 x 28 mm JOSE E reducing the stenosis to no significant residual disease and improvement of IFR. Patent stent of mid LAD with nonobstructive 20 to 30% plaque. 90% stenosis of the ostial/proximal ramus intermedius.  This vessel is now status post JOSE E with 2.25 x 28 mm JOSE E reducing the stenosis to 0%. No significant disease of the codominant left circumflex coronary artery and the codominant right coronary artery.   30 day MCOT, 2022; SR. Rare APCs and PVCs. No significant abnormalities.   Echo, 2024: EF  "56-60%. Trace MR. Richard TR.   Lower extremity weakness  Hypertension  Hyperlipidemia  Type 2 diabetes mellitus; hemoglobin A1c 7.9% August 2020  GERD  Obstructive sleep apnea, compliant with CPAP  Orthostatic dizziness/syncope  Mild obesity: BMI 30.37  Depression  COPD  Chronic back pain  Smokeless tobacco use; 1 stick every 3 days  History of basal cell carcinoma  History of rheumatic fever  Surgical history:  Blepharoplasty bilateral  LHC  Right rotator cuff repair  Sinus plasty  Basal cell carcinoma skin excision    History of Present Illness  Patient presents today for a hospital follow-up with a history of CAD and cardiac risk factors. Since last visit, the patient recently presented to [facility] emergency department and was admitted for overnight observation.  States that he was at home and had gone over to the kitchen when his legs started shaking, became lightheaded and then his family got a hold of him and eased him down to the floor.  He was passing out off-and-on.  Upon arrival to the ER he was noted to have orthostatic hypotension however subsequently his blood pressure shot up and they ended up starting him on amlodipine 5 mg daily for blood pressure control.  His workup included negative cardiac markers, he was told of some abnormality on the EKG which he is not clear about.  States that his CT scan showed \"increased fluid\".  I am concerned that he may have hydrocephalus.  Patient does report unsteadiness on his feet but denies any urinary symptoms.  No chest pain shortness of breath or palpitations.    Allergies   Allergen Reactions    Contrast Dye (Echo Or Unknown Ct/Mr) Headache     Had a headache for 9 days     Dust Mite Extract Headache    Grass Headache    Molds & Smuts Headache    Pollen Extract Headache         Current Outpatient Medications:     albuterol sulfate  (90 Base) MCG/ACT inhaler, Inhale 2 puffs Every 4 (Four) Hours As Needed for Wheezing., Disp: , Rfl:     " amLODIPine (NORVASC) 5 MG tablet, Take 1 tablet by mouth Daily for 90 days., Disp: 90 tablet, Rfl: 0    aspirin 81 MG EC tablet, Take 1 tablet by mouth Daily., Disp: , Rfl:     budesonide-formoterol (SYMBICORT) 160-4.5 MCG/ACT inhaler, INHALE 2 PUFFS TWICE A DAY FOR 30 DAYS, Disp: 10.2 each, Rfl: 2    carboxymethylcellulose (REFRESH PLUS) 0.5 % solution, 3 (Three) Times a Day As Needed for Dry Eyes., Disp: , Rfl:     carvedilol (COREG) 25 MG tablet, Take 1 tablet by mouth 2 (Two) Times a Day With Meals., Disp: 180 tablet, Rfl: 3    clopidogrel (PLAVIX) 75 MG tablet, TAKE 1 TABLET BY MOUTH EVERY DAY, Disp: 90 tablet, Rfl: 3    doxepin (SINEquan) 100 MG capsule, Take 1 capsule by mouth every night at bedtime., Disp: , Rfl:     escitalopram (LEXAPRO) 20 MG tablet, TAKE 2 & 1/2 TABLETS BY MOUTH DAILY 90 DAYS, Disp: 225 tablet, Rfl: 0    ezetimibe (ZETIA) 10 MG tablet, Take 1 tablet by mouth Daily., Disp: , Rfl:     gabapentin (NEURONTIN) 400 MG capsule, Take 1 capsule by mouth 3 (Three) Times a Day., Disp: , Rfl:     Gel Base gel, mannitol 20%, capsaicin 0.001%, lidocaine 10%, prilocaine 2% cream, apply cream to affected areas every Q4-6 hoursPRN, Disp: 240 g, Rfl: 5    glucose 4-6 GM-MG per chewable tablet, Chew 2 tablets As Needed for Low Blood Sugar., Disp: , Rfl:     hydrALAZINE (APRESOLINE) 50 MG tablet, Take 1 tablet by mouth 3 (Three) Times a Day., Disp: 270 tablet, Rfl: 2    hydrOXYzine (ATARAX) 25 MG tablet, Take 1 tablet by mouth 1 (One) Time As Needed for Anxiety., Disp: , Rfl:     insulin aspart (novoLOG FLEXPEN) 100 UNIT/ML solution pen-injector sc pen, Inject 14 Units under the skin into the appropriate area as directed 2 (Two) Times a Day With Meals. 16 units in the am, 14 units in the Pm, Disp: , Rfl:     insulin glargine (LANTUS, SEMGLEE) 100 UNIT/ML injection, Inject 34 Units under the skin into the appropriate area as directed Daily. Recently changed to 34u in AM, Disp: , Rfl:     loratadine  "(CLARITIN) 10 MG tablet, Take 1 tablet by mouth Daily., Disp: , Rfl:     LORazepam (ATIVAN) 2 MG tablet, Take 1 tablet by mouth 2 (Two) Times a Day., Disp: , Rfl:     lovastatin (MEVACOR) 40 MG tablet, Take 1 tablet by mouth Every Night for 30 days., Disp: 30 tablet, Rfl: 2    montelukast (SINGULAIR) 10 MG tablet, Take 1 tablet by mouth Every Night., Disp: , Rfl:     nitroglycerin (NITROSTAT) 0.4 MG SL tablet, Place 1 tablet under the tongue Every 5 (Five) Minutes As Needed for Chest Pain. Take no more than 3 doses in 15 minutes., Disp: , Rfl:     omeprazole (priLOSEC) 40 MG capsule, TAKE 1 CAPSULE BY MOUTH EVERY DAY 30 MINUTES BEFORE MORNING MEAL, Disp: , Rfl:     sacubitril-valsartan (ENTRESTO)  MG tablet, Take 1 tablet by mouth 2 (Two) Times a Day., Disp: 90 tablet, Rfl: 3    salsalate (DISALCID) 750 MG tablet, Take 1 tablet by mouth 2 (Two) Times a Day., Disp: , Rfl:     tamsulosin (FLOMAX) 0.4 MG capsule 24 hr capsule, Take 1 capsule by mouth Daily., Disp: , Rfl:     dapagliflozin Propanediol (Farxiga) 10 MG tablet, Take 10 mg by mouth Daily. (Patient not taking: Reported on 6/14/2024), Disp: 30 tablet, Rfl: 5    tadalafil (Cialis) 10 MG tablet, Take 1 tablet by mouth Daily As Needed for Erectile Dysfunction for up to 10 days., Disp: 10 tablet, Rfl: 2    The following portions of the patient's history were reviewed and updated as appropriate: allergies, current medications, past family history, past medical history, past social history, past surgical history and problem list.    ROS  Review of Systems   14 point ROS negative except for that listed in the HPI.         Objective:     /80   Pulse 69   Ht 188 cm (74\")   Wt 106 kg (233 lb)   SpO2 96%   BMI 29.92 kg/m²      Physical Exam  Constitutional: Patient appears well-developed and well-nourished.   HENT: HEENT exam unremarkable.   Neck: Neck supple. No JVD present. No carotid bruits.   Cardiovascular: Normal rate, regular rhythm and normal " heart sounds. No murmur heard.   2+ symmetric pulses.   Pulmonary/Chest: Breath sounds normal. Does not exhibit tenderness.   Abdominal: Abdomen benign.   Musculoskeletal: Does not exhibit edema.   Neurological: Neurological exam unremarkable.   Vitals reviewed.    Data Review:   Lab Results   Component Value Date    GLUCOSE 161 (H) 03/15/2024    BUN 9 03/15/2024    CREATININE 0.77 03/15/2024    EGFR 94.5 03/15/2024    BCR 11.7 03/15/2024     03/15/2024    K 3.1 (L) 03/15/2024    CO2 26.3 03/15/2024    CALCIUM 8.7 03/15/2024    ALBUMIN 4.4 03/15/2024    AST 11 03/15/2024    ALT 15 03/15/2024     Lab Results   Component Value Date    CHOL 152 03/15/2024    TRIG 96 03/15/2024    HDL 47 03/15/2024    LDL 87 03/15/2024      Lab Results   Component Value Date    WBC 7.05 03/15/2024    RBC 5.46 03/15/2024    HGB 16.1 03/15/2024    HCT 47.7 03/15/2024    MCV 87.4 03/15/2024     03/15/2024     Lab Results   Component Value Date    TSH 4.010 03/15/2024     Lab Results   Component Value Date    HGBA1C 7.7 (A) 03/15/2024          ECG 12 Lead    Date/Time: 5/24/2023 12:09 PM  Performed by: Moisés Bansal MD    Authorized by: Moisés Bansal MD  Comparison: compared with previous ECG   Comparison to previous ECG: Left bundle branch block is a new finding  Rhythm: sinus rhythm  Conduction: left bundle branch block    Clinical impression: abnormal EKG  Comments: Sinus rhythm, left bundle branch block.           Advance Care Planning   ACP discussion was declined by the patient. Patient does not have an advance directive, declines further assistance.           Assessment:      Diagnosis Plan   1. Syncope and collapse  Holter Monitor - 72 Hour Up To 15 Days      2. Coronary artery disease involving native coronary artery of native heart without angina pectoris  No current angina or CHF symptoms, continue current GDMT and DAPT.      3. Essential hypertension  Needs permissive hypertension due to orthostatic hypotension and  episodes of near syncope/syncope      4. Dyslipidemia  Continue current lipid management.      5. Autonomic orthostatic hypotension  Compression stockings, maintain good hydration.        Plan:   Due to episode of near syncope/syncope due to orthostatic hypotension patient needs some permissive hypertension.  Blood pressure numbers are acceptable we will continue current antihypertensive therapy.  Due to new left bundle branch block and episodes of near syncope/syncope we will obtain a 2-week Zio monitor.  Patient advised to discuss with PCP and consider neurology evaluation due to abnormal CT scan findings and unsteady gait.  I am concerned about normal pressure hydrocephalus.  Continue current medications.   FU in 3 MO at [facility], sooner as needed.  Thank you for allowing us to participate in the care of your patient.     Moisés Bansal MD, FACC, [facility]        Please note that portions of this note may have been completed with a voice recognition program. Efforts were made to edit the dictations, but occasionally words are mistranscribed.

## 2024-06-14 NOTE — ASSESSMENT & PLAN NOTE
Hypertension is stable and controlled  Continue current treatment regimen.  Dietary sodium restriction.  Blood pressure will be reassessed in 3 months.

## 2024-07-10 ENCOUNTER — OFFICE VISIT (OUTPATIENT)
Dept: NEUROSURGERY | Facility: CLINIC | Age: 74
End: 2024-07-10
Payer: MEDICARE

## 2024-07-10 VITALS
WEIGHT: 231 LBS | SYSTOLIC BLOOD PRESSURE: 140 MMHG | TEMPERATURE: 97.1 F | HEIGHT: 74 IN | DIASTOLIC BLOOD PRESSURE: 70 MMHG | BODY MASS INDEX: 29.65 KG/M2

## 2024-07-10 DIAGNOSIS — M50.30 DEGENERATION OF CERVICAL INTERVERTEBRAL DISC: ICD-10-CM

## 2024-07-10 DIAGNOSIS — G93.89 CEREBRAL VENTRICULOMEGALY: ICD-10-CM

## 2024-07-10 DIAGNOSIS — G95.9 CERVICAL MYELOPATHY: Primary | ICD-10-CM

## 2024-07-10 DIAGNOSIS — M54.2 NECK PAIN: ICD-10-CM

## 2024-07-10 PROCEDURE — 3077F SYST BP >= 140 MM HG: CPT | Performed by: PHYSICIAN ASSISTANT

## 2024-07-10 PROCEDURE — 99214 OFFICE O/P EST MOD 30 MIN: CPT | Performed by: PHYSICIAN ASSISTANT

## 2024-07-10 PROCEDURE — 3078F DIAST BP <80 MM HG: CPT | Performed by: PHYSICIAN ASSISTANT

## 2024-07-10 RX ORDER — HYDRALAZINE HYDROCHLORIDE 25 MG/1
25 TABLET, FILM COATED ORAL 3 TIMES DAILY
COMMUNITY
Start: 2024-07-08

## 2024-07-10 NOTE — PROGRESS NOTES
Outside XR disc of cervical spine and outside CT disc of head loaded into pt's chart and returned to pt in exam room.

## 2024-07-10 NOTE — PROGRESS NOTES
Patient: Panda Henry  : 1950  Chart #: [id number]    Date of Service: 7/10/2024    CHIEF COMPLAINT: Neck and arm pain with sensory alteration     History of Present Illness Mr. Henry is a 74-year-old retired  with a past medical history significant for CAD (s/p stenting- most recent was with Dr Bansal in ) on plavix, HTN, HLD, and chronic back pain.  He presented to our clinic with neck and bilateral arm pain with sensory alteration and gait disturbance.  Preoperative studies demonstrated mild myelopathy.  Ultimately on 2023 underwent ACDF C4-6.  Postoperatively, his hand symptoms and arm symptoms improved. He still has a little numbness in his hands at night that improved with repositioning.  His gait continues to show improvements. He complains of dorsal neck discomfort that will extend into the shoulders. Symptoms improve with Aspercream    About a month ago he had a syncopal episode and was evaluated in the emergency department.  He mentions his blood pressure medications have been changed around.  A CT scan was performed and demonstrated ventriculomegaly and he was referred back to our office for concerns of NPH.  He denies urinary incontinence.  No cognitive changes-also confirmed by his family present with him today.  He complains of some dizziness/the room spinning sensation that occurs at times.    Past Medical History:   Diagnosis Date    Abnormal ECG     Allergic     Arthritis     Asthma     Cancer     basal cell carcinoma    Cervical disc disorder     CHF (congestive heart failure)     COPD (chronic obstructive pulmonary disease)     Coronary artery disease     Depression     Diabetes mellitus     Erectile dysfunction     GERD (gastroesophageal reflux disease)     Headache     Hyperlipidemia     Hypertension     Injury of back     Low back pain     Lumbosacral disc disease     Neuropathy     Peripheral neuropathy     Pulmonary arterial hypertension      Sleep apnea     Spinal headache 1988    AFTER MYELOGRAM-PER PATIENT, HEADACHE FOR 9 NINE DAYS    Spinal stenosis     Thoracic disc disorder          Current Outpatient Medications:     albuterol sulfate  (90 Base) MCG/ACT inhaler, Inhale 2 puffs Every 4 (Four) Hours As Needed for Wheezing., Disp: , Rfl:     amLODIPine (NORVASC) 5 MG tablet, Take 1 tablet by mouth Daily for 90 days., Disp: 90 tablet, Rfl: 0    aspirin 81 MG EC tablet, Take 1 tablet by mouth Daily., Disp: , Rfl:     budesonide-formoterol (SYMBICORT) 160-4.5 MCG/ACT inhaler, INHALE 2 PUFFS TWICE A DAY FOR 30 DAYS, Disp: 10.2 each, Rfl: 2    carboxymethylcellulose (REFRESH PLUS) 0.5 % solution, 3 (Three) Times a Day As Needed for Dry Eyes., Disp: , Rfl:     carvedilol (COREG) 25 MG tablet, Take 1 tablet by mouth 2 (Two) Times a Day With Meals., Disp: 180 tablet, Rfl: 3    clopidogrel (PLAVIX) 75 MG tablet, TAKE 1 TABLET BY MOUTH EVERY DAY, Disp: 90 tablet, Rfl: 3    dapagliflozin Propanediol (Farxiga) 10 MG tablet, Take 10 mg by mouth Daily. (Patient not taking: Reported on 6/14/2024), Disp: 30 tablet, Rfl: 5    doxepin (SINEquan) 100 MG capsule, Take 1 capsule by mouth every night at bedtime., Disp: , Rfl:     escitalopram (LEXAPRO) 20 MG tablet, TAKE 2 & 1/2 TABLETS BY MOUTH DAILY 90 DAYS, Disp: 225 tablet, Rfl: 0    ezetimibe (ZETIA) 10 MG tablet, Take 1 tablet by mouth Daily., Disp: , Rfl:     gabapentin (NEURONTIN) 400 MG capsule, Take 1 capsule by mouth 3 (Three) Times a Day., Disp: , Rfl:     Gel Base gel, mannitol 20%, capsaicin 0.001%, lidocaine 10%, prilocaine 2% cream, apply cream to affected areas every Q4-6 hoursPRN, Disp: 240 g, Rfl: 5    glucose 4-6 GM-MG per chewable tablet, Chew 2 tablets As Needed for Low Blood Sugar., Disp: , Rfl:     hydrALAZINE (APRESOLINE) 50 MG tablet, Take 1 tablet by mouth 3 (Three) Times a Day., Disp: 270 tablet, Rfl: 2    hydrOXYzine (ATARAX) 25 MG tablet, Take 1 tablet by mouth 1 (One) Time As Needed  for Anxiety., Disp: , Rfl:     insulin aspart (novoLOG FLEXPEN) 100 UNIT/ML solution pen-injector sc pen, Inject 14 Units under the skin into the appropriate area as directed 2 (Two) Times a Day With Meals. 16 units in the am, 14 units in the Pm, Disp: , Rfl:     insulin glargine (LANTUS, SEMGLEE) 100 UNIT/ML injection, Inject 34 Units under the skin into the appropriate area as directed Daily. Recently changed to 34u in AM, Disp: , Rfl:     loratadine (CLARITIN) 10 MG tablet, Take 1 tablet by mouth Daily., Disp: , Rfl:     LORazepam (ATIVAN) 2 MG tablet, Take 1 tablet by mouth 2 (Two) Times a Day., Disp: , Rfl:     lovastatin (MEVACOR) 40 MG tablet, Take 1 tablet by mouth Every Night for 30 days., Disp: 30 tablet, Rfl: 2    montelukast (SINGULAIR) 10 MG tablet, Take 1 tablet by mouth Every Night., Disp: , Rfl:     nitroglycerin (NITROSTAT) 0.4 MG SL tablet, Place 1 tablet under the tongue Every 5 (Five) Minutes As Needed for Chest Pain. Take no more than 3 doses in 15 minutes., Disp: , Rfl:     omeprazole (priLOSEC) 40 MG capsule, TAKE 1 CAPSULE BY MOUTH EVERY DAY 30 MINUTES BEFORE MORNING MEAL, Disp: , Rfl:     sacubitril-valsartan (ENTRESTO)  MG tablet, Take 1 tablet by mouth 2 (Two) Times a Day., Disp: 90 tablet, Rfl: 3    salsalate (DISALCID) 750 MG tablet, Take 1 tablet by mouth 2 (Two) Times a Day., Disp: , Rfl:     tadalafil (Cialis) 10 MG tablet, Take 1 tablet by mouth Daily As Needed for Erectile Dysfunction for up to 10 days., Disp: 10 tablet, Rfl: 2    tamsulosin (FLOMAX) 0.4 MG capsule 24 hr capsule, Take 1 capsule by mouth Daily., Disp: , Rfl:     Past Surgical History:   Procedure Laterality Date    ANTERIOR CERVICAL DISCECTOMY W/ FUSION N/A 06/01/2023    Procedure: CERVICAL DISCECTOMY ANTERIOR WITH FUSION C4-5, C5-6;  Surgeon: Zana Olmos MD;  Location: [facility];  Service: Neurosurgery;  Laterality: N/A;    BLEPHAROPLASTY Bilateral     CARDIAC CATHETERIZATION      CARDIAC CATHETERIZATION       11/30/2022 per Dr. Bansal    CARDIAC CATHETERIZATION Left 11/30/2022    Procedure: Left Heart Cath;  Surgeon: Moisés Bansal MD;  Location: [facility] CATH INVASIVE LOC[facility];  Service: Cardiovascular;  Laterality: Left;    CARDIAC SURGERY      CAROTID STENT      CORONARY STENT PLACEMENT      SHOULDER ROTATOR CUFF REPAIR Right     SINUPLASTY      SINUS SURGERY      SKIN BIOPSY      ON NOSE AND RIGHT CHEEK    TRIGGER POINT INJECTION         Social History     Socioeconomic History    Marital status:    Tobacco Use    Smoking status: Never    Smokeless tobacco: Current     Types: Chew   Vaping Use    Vaping status: Never Used   Substance and Sexual Activity    Alcohol use: Never     Comment: Drank some years ago    Drug use: Never    Sexual activity: Not Currently     Partners: Female         Review of Systems   Constitutional:  Positive for fatigue. Negative for activity change, appetite change, chills, diaphoresis, fever and unexpected weight change.   HENT:  Positive for hearing loss and postnasal drip. Negative for congestion, dental problem, drooling, ear discharge, ear pain, facial swelling, mouth sores, nosebleeds, rhinorrhea, sinus pressure, sneezing, sore throat, tinnitus, trouble swallowing and voice change.    Eyes:  Positive for itching. Negative for photophobia, pain, discharge, redness and visual disturbance.   Respiratory:  Positive for shortness of breath. Negative for apnea, cough, choking, chest tightness, wheezing and stridor.    Cardiovascular: Negative.  Negative for chest pain, palpitations and leg swelling.   Gastrointestinal: Negative.  Negative for abdominal distention, abdominal pain, anal bleeding, blood in stool, constipation, diarrhea, nausea, rectal pain and vomiting.   Endocrine: Negative.  Negative for cold intolerance, heat intolerance, polydipsia, polyphagia and polyuria.   Genitourinary:  Positive for genital sores. Negative for decreased urine volume, difficulty urinating,  dysuria, enuresis, flank pain, frequency, hematuria and urgency.   Musculoskeletal:  Positive for back pain, gait problem, neck pain and neck stiffness. Negative for arthralgias, joint swelling and myalgias.   Skin: Negative.  Negative for color change, pallor, rash and wound.   Allergic/Immunologic: Positive for environmental allergies. Negative for food allergies and immunocompromised state.   Neurological:  Positive for dizziness, tremors, weakness, light-headedness, numbness and headaches. Negative for seizures, syncope, facial asymmetry and speech difficulty.   Hematological: Negative.  Negative for adenopathy. Does not bruise/bleed easily.   Psychiatric/Behavioral:  Positive for agitation, confusion and decreased concentration. Negative for behavioral problems, dysphoric mood, hallucinations, self-injury, sleep disturbance and suicidal ideas. The patient is nervous/anxious. The patient is not hyperactive.    All other systems reviewed and are negative.      Objective   Vital Signs: There were no vitals taken for this visit.  Physical Exam  Vitals and nursing note reviewed.   Constitutional:       General: He is not in acute distress.     Appearance: He is well-developed.   HENT:      Head: Normocephalic and atraumatic.   Pulmonary:      Breath sounds: Normal breath sounds.   Psychiatric:         Behavior: Behavior normal.         Thought Content: Thought content normal.   Musculoskeletal:     Strength is intact in upper and lower extremities to direct testing.     Gait is independent and steady.  Neurologic:     Muscle tone is normal throughout.     Coordination is intact.     Deep tendon reflexes: 3+ bicep, 2+ tricep.  Difficult to elicit in the lower extremities     Sensation is intact to light touch throughout.     Patient is oriented to person, place, and time.     Yvonne sign negative.  No ankle clonus       Independent review of radiographic imaging: Plain films of the cervical spine dated 7/8/2024  demonstrate good placement and alignment of surgical construct.    CT of the brain demonstrates some mild ventriculomegaly.  Imaging also reviewed by Dr. Olmos    Assessment & Plan   Diagnosis:   1.  Cervical spondylosis with radiculomyelopathy  2.  Chronic mechanical back pain  3.  Bilateral carpal tunnel syndrome  4.  Ventriculomegaly    Medical Decision Making: Patient is doing well in terms of his neck.  I reviewed CT of the brain with Dr. Olmos.  He has some mild ventriculomegaly but I do not feel like he harbors syndrome of pressure hydrocephalus.  Nevertheless we will monitor symptoms and I have asked patient to call me if he has any issues with urinary incontinence, cognitive decline or progressive gait difficulties.        1. Cervical spondylosis with myelopathy (Primary)      2. Cervical radiculopathy                 Juanita Rajan PA-C  Patient Care Team:  Alberto Green MD as PCP - General (Family Medicine)  Moisés Bansal MD as Consulting Physician (Cardiology)  Alberto Green MD as Referring Physician (Family Medicine)  Zana Omlos MD as Surgeon (Neurosurgery)

## 2024-08-14 RX ORDER — SACUBITRIL AND VALSARTAN 97; 103 MG/1; MG/1
1 TABLET, FILM COATED ORAL 2 TIMES DAILY
Qty: 60 TABLET | Refills: 2 | Status: SHIPPED | OUTPATIENT
Start: 2024-08-14

## 2024-08-21 DIAGNOSIS — I25.10 CORONARY ARTERY DISEASE INVOLVING NATIVE CORONARY ARTERY OF NATIVE HEART WITHOUT ANGINA PECTORIS: ICD-10-CM

## 2024-08-21 RX ORDER — CLOPIDOGREL BISULFATE 75 MG/1
75 TABLET ORAL DAILY
Qty: 90 TABLET | Refills: 3 | Status: SHIPPED | OUTPATIENT
Start: 2024-08-21

## 2024-08-21 RX ORDER — DAPAGLIFLOZIN 10 MG/1
TABLET, FILM COATED ORAL
Qty: 90 TABLET | Refills: 5 | Status: SHIPPED | OUTPATIENT
Start: 2024-08-21

## 2024-09-25 ENCOUNTER — TELEPHONE (OUTPATIENT)
Dept: FAMILY MEDICINE CLINIC | Facility: CLINIC | Age: 74
End: 2024-09-25

## 2024-09-25 NOTE — TELEPHONE ENCOUNTER
Caller: ZELDA EVERETT    Relationship: Emergency Contact    Best call back number: [phone]     Requested Prescriptions:   OXYCODONE IR 10MG        Pharmacy where request should be sent: [facility] - [place], [place] - [address] [facility] [address] [facility] [phone] [facility] [phone] FX     Last office visit with prescribing clinician: 6/11/2024   Last telemedicine visit with prescribing clinician: Visit date not found   Next office visit with prescribing clinician: 10/1/2024     Additional details provided by patient: WAS GIVEN THIS AT HOSPITAL AND OUT OF MEDICATION. NOT BEEN IN FOR FOLLOW UP YET. PLEASE CALL TO LET KNOW IF WE CAN FILL OR NOT     Does the patient have less than a 3 day supply:  [x] Yes  [] No    Would you like a call back once the refill request has been completed: [x] Yes [] No    If the office needs to give you a call back, can they leave a voicemail: [x] Yes [] No    Fran Ballard Rep   09/25/24 15:53 EDT

## 2024-10-02 ENCOUNTER — TELEPHONE (OUTPATIENT)
Dept: FAMILY MEDICINE CLINIC | Facility: CLINIC | Age: 74
End: 2024-10-02
Payer: MEDICARE

## 2024-10-08 ENCOUNTER — TELEPHONE (OUTPATIENT)
Dept: CARDIOLOGY | Facility: CLINIC | Age: 74
End: 2024-10-08
Payer: MEDICARE

## 2024-10-08 NOTE — TELEPHONE ENCOUNTER
Patient's wife called to report patient's BP has been dropping 3-4 times per week when he stands up.  She reports he is taking entresto  BID,, hydralazine 25 mg TID, carvedilol 25 mg BID, spironolactone 25 QD.  BP drops in the am prior to am med's, once he gets up, 3-4 times week, one hour to 1/2, they check his sitting BP and she states as long as he is sitting, it is fine but occasionally it drops when he stands up and can happen at any time during the day.      Wife advises he had neuro workup which was normal and pt wore Holter monitor through our office     was able to move patient appt to next week.

## 2024-10-09 ENCOUNTER — TELEPHONE (OUTPATIENT)
Dept: FAMILY MEDICINE CLINIC | Age: 74
End: 2024-10-09

## 2024-10-09 NOTE — TELEPHONE ENCOUNTER
Caller: JA    Relationship: Home Health    Best call back number: [phone]     What is the best time to reach you: ANYTIME    Who are you requesting to speak with (clinical staff, provider,  specific staff member): CLINICAL STAFF    Do you know the name of the person who called: ZELDA RUIZ    What was the call regarding: ZELDA CALLED TO INFORM US THAT THE HOME HEALTH NURSE (JA) WHO COMES OUT TO SEE THE PATIENT BELIEVES THAT HE MIGHT BE DEHYDRATED, AND WANTED TO REQUEST ORDERS FOR A BLOOD TEST    PLEASE ADVISE

## 2024-10-15 ENCOUNTER — OFFICE VISIT (OUTPATIENT)
Age: 74
End: 2024-10-15
Payer: MEDICARE

## 2024-10-15 VITALS
SYSTOLIC BLOOD PRESSURE: 160 MMHG | HEIGHT: 74 IN | DIASTOLIC BLOOD PRESSURE: 84 MMHG | BODY MASS INDEX: 29.65 KG/M2 | HEART RATE: 98 BPM | WEIGHT: 231 LBS | OXYGEN SATURATION: 97 %

## 2024-10-15 DIAGNOSIS — I25.10 CORONARY ARTERY DISEASE INVOLVING NATIVE CORONARY ARTERY OF NATIVE HEART WITHOUT ANGINA PECTORIS: Primary | ICD-10-CM

## 2024-10-15 DIAGNOSIS — E78.5 DYSLIPIDEMIA: ICD-10-CM

## 2024-10-15 DIAGNOSIS — I10 ESSENTIAL HYPERTENSION: ICD-10-CM

## 2024-10-15 DIAGNOSIS — I95.1 AUTONOMIC ORTHOSTATIC HYPOTENSION: ICD-10-CM

## 2024-10-15 PROCEDURE — 3079F DIAST BP 80-89 MM HG: CPT | Performed by: INTERNAL MEDICINE

## 2024-10-15 PROCEDURE — 1159F MED LIST DOCD IN RCRD: CPT | Performed by: INTERNAL MEDICINE

## 2024-10-15 PROCEDURE — 3077F SYST BP >= 140 MM HG: CPT | Performed by: INTERNAL MEDICINE

## 2024-10-15 PROCEDURE — 99214 OFFICE O/P EST MOD 30 MIN: CPT | Performed by: INTERNAL MEDICINE

## 2024-10-15 PROCEDURE — 1160F RVW MEDS BY RX/DR IN RCRD: CPT | Performed by: INTERNAL MEDICINE

## 2024-10-15 RX ORDER — AMLODIPINE BESYLATE 10 MG/1
10 TABLET ORAL DAILY
Qty: 90 TABLET | Refills: 3 | Status: SHIPPED | OUTPATIENT
Start: 2024-10-15

## 2024-10-15 NOTE — PROGRESS NOTES
[facility] Cardiology    Encounter Date: 10/15/2024    Patient ID: Panda Henry is a 74 y.o. male.  : 1950     PCP: Alberto Green MD       Chief Complaint: Syncope and collapse      PROBLEM LIST:  Coronary artery disease  Abnormal stress test followed by left heart catheterization with stent placement to the LAD 2019-data deficit ([facility])  CCS class I-II atypical chest discomfort/NYHA class I-II dyspnea on exertion symptoms  Cardiac PET 8/3/2020: Acceptable negative IV Lexiscan hybrid PET cardiac CT stress scan studies suggestive of low probability for significant focal obstructive CAD with preserved systolic LV function (LVEF 0.54)  Echo, 2022; EF 56-60%. Mild concentric LVH. Grade I diastolic dysfunction with high LAP. RA cavity is mildly dilated.  Lexiscan, 10/25/2022; EF 50%. Scintigraphy demonstrates a fixed inferior wall defect compatible with infarct or diaphragmatic attenuation. Additionally there is a moderately sized, moderately dense but incompletely reversible inferolateral wall defect compatible with ischemia. Elevated transient ischemic dilation ratio 1.26 possibly representing multivessel coronary disease. No evidence of increased lung uptake of radiopharmaceutical.  LHC, 2022; EF 65%. 75 to 80% stenosis of the ostial/proximal LAD with abnormal IFR.  This vessel is now status post successful stenting with 3.5 x 28 mm JOSE E reducing the stenosis to no significant residual disease and improvement of IFR. Patent stent of mid LAD with nonobstructive 20 to 30% plaque. 90% stenosis of the ostial/proximal ramus intermedius.  This vessel is now status post JOSE E with 2.25 x 28 mm JOSE E reducing the stenosis to 0%. No significant disease of the codominant left circumflex coronary artery and the codominant right coronary artery.   Echo, 2024: EF 56-60%. Trace MR. Physiological TR.   Syncope  30 day MCOT, 2022; SR. Rare APCs  and PVCs. No significant abnormalities.    Holter, 06/14/2024: Sinus rhythm with bundle branch block. Rare PACs and PVCs. Single episode of PAT/SVT lasting 15.6 seconds. No symptoms reported.  Lower extremity weakness  Hypertension  Hyperlipidemia  Type 2 diabetes mellitus; hemoglobin A1c 7.9% August 2020  GERD  Obstructive sleep apnea, compliant with CPAP  Orthostatic dizziness/syncope  Mild obesity: BMI 30.37  Depression  COPD  Chronic back pain  Smokeless tobacco use; 1 stick every 3 days  History of basal cell carcinoma  History of rheumatic fever  Surgical history:  Blepharoplasty bilateral  LHC  Right rotator cuff repair  Sinus plasty  Basal cell carcinoma skin excision    History of Present Illness  Patient presents today for a follow-up with a history of CAD and cardiac risk factors. Since last visit, patient has been doing well overall from a cardiovascular standpoint. However, he reports several episodes of near syncope and syncope due to blood pressures running low. Patient reports an episode of syncope on 08/03/2024 where he fell and broke his hip. He was hospitalized for 3 weeks and had the hip repaired. Patient has been sedentary since his hospitalization but does PT at home once a week. He denies chest pain, shortness of breath, orthopnea, palpitations, and edema.  Continues to experience dizziness upon standing.  His supine blood pressure has been difficult to control.  He has reviewed his medications and states that orthostatic hypotension and dizziness is listed as a side effect of every medication however he feels that Entresto is contributing mostly to his symptoms.      Allergies   Allergen Reactions    Contrast Dye (Echo Or Unknown Ct/Mr) Headache     Had a headache for 9 days     Dust Mite Extract Headache    Grass Headache    Molds & Smuts Headache    Pollen Extract Headache    Hydrocodone Provider Review Needed         Current Outpatient Medications:     albuterol sulfate  (90 Base)  MCG/ACT inhaler, Inhale 2 puffs Every 4 (Four) Hours As Needed for Wheezing., Disp: , Rfl:     aspirin 81 MG EC tablet, Take 1 tablet by mouth Daily., Disp: , Rfl:     budesonide-formoterol (SYMBICORT) 160-4.5 MCG/ACT inhaler, INHALE 2 PUFFS TWICE A DAY FOR 30 DAYS, Disp: 10.2 each, Rfl: 2    carboxymethylcellulose (REFRESH PLUS) 0.5 % solution, 3 (Three) Times a Day As Needed for Dry Eyes., Disp: , Rfl:     carvedilol (COREG) 25 MG tablet, Take 1 tablet by mouth 2 (Two) Times a Day With Meals., Disp: 180 tablet, Rfl: 3    clopidogrel (PLAVIX) 75 MG tablet, Take 1 tablet by mouth Daily., Disp: 90 tablet, Rfl: 3    doxepin (SINEquan) 100 MG capsule, Take 1 capsule by mouth every night at bedtime., Disp: , Rfl:     Entresto  MG tablet, TAKE 1 TABLET BY MOUTH 2 (TWO) TIMES A DAY., Disp: 60 tablet, Rfl: 2    escitalopram (LEXAPRO) 20 MG tablet, TAKE 2 & 1/2 TABLETS BY MOUTH DAILY 90 DAYS, Disp: 225 tablet, Rfl: 0    ezetimibe (ZETIA) 10 MG tablet, Take 1 tablet by mouth Daily., Disp: , Rfl:     Farxiga 10 MG tablet, TAKE (1 TABLET )10 MG BY MOUTH DAILY., Disp: 90 tablet, Rfl: 5    gabapentin (NEURONTIN) 400 MG capsule, Take 1 capsule by mouth 3 (Three) Times a Day., Disp: , Rfl:     Gel Base gel, mannitol 20%, capsaicin 0.001%, lidocaine 10%, prilocaine 2% cream, apply cream to affected areas every Q4-6 hoursPRN, Disp: 240 g, Rfl: 5    glucose 4-6 GM-MG per chewable tablet, Chew 2 tablets As Needed for Low Blood Sugar., Disp: , Rfl:     hydrALAZINE (APRESOLINE) 25 MG tablet, Take 1 tablet by mouth 3 (Three) Times a Day., Disp: , Rfl:     hydrOXYzine (ATARAX) 25 MG tablet, Take 1 tablet by mouth 1 (One) Time As Needed for Anxiety., Disp: , Rfl:     insulin aspart (novoLOG FLEXPEN) 100 UNIT/ML solution pen-injector sc pen, Inject 14 Units under the skin into the appropriate area as directed 2 (Two) Times a Day With Meals. 16 units in the am, 14 units in the Pm, Disp: , Rfl:     insulin glargine (LANTUS, SEMGLEE) 100  "UNIT/ML injection, Inject 34 Units under the skin into the appropriate area as directed Daily. Recently changed to 34u in AM, Disp: , Rfl:     loratadine (CLARITIN) 10 MG tablet, Take 1 tablet by mouth Daily., Disp: , Rfl:     LORazepam (ATIVAN) 2 MG tablet, Take 1 tablet by mouth 2 (Two) Times a Day., Disp: , Rfl:     lovastatin (MEVACOR) 40 MG tablet, Take 1 tablet by mouth Every Night for 30 days., Disp: 30 tablet, Rfl: 2    montelukast (SINGULAIR) 10 MG tablet, Take 1 tablet by mouth Every Night., Disp: , Rfl:     nitroglycerin (NITROSTAT) 0.4 MG SL tablet, Place 1 tablet under the tongue Every 5 (Five) Minutes As Needed for Chest Pain. Take no more than 3 doses in 15 minutes., Disp: , Rfl:     omeprazole (priLOSEC) 40 MG capsule, TAKE 1 CAPSULE BY MOUTH EVERY DAY 30 MINUTES BEFORE MORNING MEAL, Disp: , Rfl:     salsalate (DISALCID) 750 MG tablet, Take 1 tablet by mouth 2 (Two) Times a Day., Disp: , Rfl:     tamsulosin (FLOMAX) 0.4 MG capsule 24 hr capsule, Take 1 capsule by mouth Daily., Disp: , Rfl:     tadalafil (Cialis) 10 MG tablet, Take 1 tablet by mouth Daily As Needed for Erectile Dysfunction for up to 10 days., Disp: 10 tablet, Rfl: 2    The following portions of the patient's history were reviewed and updated as appropriate: allergies, current medications, past family history, past medical history, past social history, past surgical history and problem list.    ROS  Review of Systems   14 point ROS negative except for that listed in the HPI.         Objective:     /84 (BP Location: Left arm, Patient Position: Sitting, Cuff Size: Adult)   Pulse 98   Ht 188 cm (74\")   Wt 105 kg (231 lb)   SpO2 97%   BMI 29.66 kg/m²      Physical Exam  Constitutional: Patient appears well-developed and well-nourished.   HENT: HEENT exam unremarkable.   Neck: Neck supple. No JVD present. No carotid bruits.   Cardiovascular: Normal rate, regular rhythm and normal heart sounds. No murmur heard.   2+ symmetric " pulses.   Pulmonary/Chest: Breath sounds normal. Does not exhibit tenderness.   Abdominal: Abdomen benign.   Musculoskeletal: Does not exhibit edema.   Neurological: Neurological exam unremarkable.   Vitals reviewed.    Data Review:   Lab Results   Component Value Date    GLUCOSE 161 (H) 03/15/2024    BUN 9 03/15/2024    CREATININE 0.77 03/15/2024    EGFR 94.5 03/15/2024    BCR 11.7 03/15/2024     03/15/2024    K 3.1 (L) 03/15/2024    CO2 26.3 03/15/2024    CALCIUM 8.7 03/15/2024    ALBUMIN 4.4 03/15/2024    AST 11 03/15/2024    ALT 15 03/15/2024     Lab Results   Component Value Date    CHOL 152 03/15/2024    TRIG 96 03/15/2024    HDL 47 03/15/2024    LDL 87 03/15/2024      Lab Results   Component Value Date    WBC 7.05 03/15/2024    RBC 5.46 03/15/2024    HGB 16.1 03/15/2024    HCT 47.7 03/15/2024    MCV 87.4 03/15/2024     03/15/2024     Lab Results   Component Value Date    TSH 4.010 03/15/2024     Lab Results   Component Value Date    HGBA1C 7.7 (A) 03/15/2024        Procedures       Advance Care Planning   ACP discussion was declined by the patient. Patient does not have an advance directive, declines further assistance.           Assessment:      Diagnosis Plan   1. Coronary artery disease involving native coronary artery of native heart without angina pectoris  Stable without angina on current activity. Continue on aspirin 81 mg for antiplatelet therapy. Continue on Plavix 75 mg daily for antiplatelet therapy. Continue on nitroglycerin 0.4 mg PRN for chest pain.      2. Autonomic orthostatic hypotension  Stable. DC Entresto  BID replace with amlodipine 10 mg daily, we will monitor blood pressure and in case of significant elevation of blood pressure we will consider adding nighttime dose of losartan..  Compression stocking and maintenance of good hydration recommended.  Discussed that some permissive hypertension is needed to avoid substantial orthostatic hypotension.      3. Essential  hypertension  Elevated. Start on amlodipine 10 mg daily for hypertension. Continue on carvedilol 25 mg BID for hypertension.  We are stopping Entresto and depending on blood pressure control we will consider adding nighttime dose of losartan.      4. Dyslipidemia  Well controlled. Continue on Zetia 10 mg daily for hyperlipidemia. Continue on lovastatin 40 mg daily for hyperlipidemia.         Plan:   Stable cardiac status.  Ongoing problems with orthostatic hypotension.  Continue routinely monitoring blood pressure at home with a goal for systolic to be <140 mmHg.  Due to significant orthostasis we will discontinue Entresto and replace with amlodipine 10 mg daily.   In case of substantial elevation of blood pressure we will consider adding nighttime dose of losartan.   Considering his symptomatic orthostatic hypotension to a degree that he fell and fractured his hip he will need some permissive hypertension.   We recommended use of compression stocking and maintenance of good hydration.  Continue rest of the current medications.   FU in 1 MO, sooner as needed.  Thank you for allowing us to participate in the care of your patient.     Scribed for Moisés Bansal MD by Carly Vaughan. 10/15/2024 13:07 EDT    I, Moisés Bansal MD, personally performed the services described in this documentation as scribed by the above named individual in my presence, and it is both accurate and complete.  10/16/2024  06:54 EDT      Please note that portions of this note may have been completed with a voice recognition program. Efforts were made to edit the dictations, but occasionally words are mistranscribed.

## 2024-11-16 ENCOUNTER — READMISSION MANAGEMENT (OUTPATIENT)
Dept: CALL CENTER | Facility: HOSPITAL | Age: 74
End: 2024-11-16
Payer: MEDICARE

## 2024-11-16 NOTE — OUTREACH NOTE
Prep Survey      Flowsheet Row Responses   [facility] facility patient discharged from? Non-BH   Is LACE score < 7 ? Non-BH Discharge   Eligibility Not Eligible   What are the reasons patient is not eligible? Other  [HH encounter]   Does the patient have one of the following disease processes/diagnoses(primary or secondary)? Other   Prep survey completed? Yes            Lavinia Green Registered Nurse

## 2024-12-02 NOTE — PROGRESS NOTES
=[facility] Cardiology    Encounter Date: 2024    Patient ID: Panda Henry is a 74 y.o. male.  : 1950     PCP: Alberto Green MD       Chief Complaint: Coronary artery disease involving native coronary artery of      PROBLEM LIST:  Coronary artery disease  Abnormal stress test followed by left heart catheterization with stent placement to the LAD 2019-data deficit ([facility])  CCS class I-II atypical chest discomfort/NYHA class I-II dyspnea on exertion symptoms  Cardiac PET 8/3/2020: Acceptable negative IV Lexiscan hybrid PET cardiac CT stress scan studies suggestive of low probability for significant focal obstructive CAD with preserved systolic LV function (LVEF 0.54)  Echo, 2022; EF 56-60%. Mild concentric LVH. Grade I diastolic dysfunction with high LAP. RA cavity is mildly dilated.  Lexiscan, 10/25/2022; EF 50%. Scintigraphy demonstrates a fixed inferior wall defect compatible with infarct or diaphragmatic attenuation. Additionally there is a moderately sized, moderately dense but incompletely reversible inferolateral wall defect compatible with ischemia. Elevated transient ischemic dilation ratio 1.26 possibly representing multivessel coronary disease. No evidence of increased lung uptake of radiopharmaceutical.  C, 2022; EF 65%. 75 to 80% stenosis of the ostial/proximal LAD with abnormal IFR.  This vessel is now status post successful stenting with 3.5 x 28 mm JOSE E reducing the stenosis to no significant residual disease and improvement of IFR. Patent stent of mid LAD with nonobstructive 20 to 30% plaque. 90% stenosis of the ostial/proximal ramus intermedius.  This vessel is now status post JOSE E with 2.25 x 28 mm JOSE E reducing the stenosis to 0%. No significant disease of the codominant left circumflex coronary artery and the codominant right coronary artery.   Echo, 2024: EF 56-60%. Trace MR. Physiological TR.    Syncope  30 day MCOT, 12/29/2022; SR. Rare APCs and PVCs. No significant abnormalities.    Holter, 06/14/2024: Sinus rhythm with bundle branch block. Rare PACs and PVCs. Single episode of PAT/SVT lasting 15.6 seconds. No symptoms reported.  Lower extremity weakness  Hypertension  Hyperlipidemia  Type 2 diabetes mellitus; hemoglobin A1c 7.9% August 2020  GERD  Obstructive sleep apnea, compliant with CPAP  Orthostatic dizziness/syncope  Mild obesity: BMI 30.37  Depression  COPD  Chronic back pain  Smokeless tobacco use; 1 stick every 3 days  History of basal cell carcinoma  History of rheumatic fever  Surgical history:  Blepharoplasty bilateral  LHC  Right rotator cuff repair  Sinus plasty  Basal cell carcinoma skin excision    History of Present Illness  Patient presents today for a follow-up with a history of CAD and orthostatic hypotension. Last visit, his Entresto was discontinued and Amlodipine added instead. He reports he has not passed out anymore, and overall his symptoms are much improved. He does report some elevated BPs now with home systolics 170-180s mHg. He also has some reflux and burning sensation with acidic foods such as tomatoes or pasta/pizza sauce. He takes Tums and it relieves his pain, however he is concerned as the pain radiates up his back and down his left arm. He is walking with a walker now.     Allergies   Allergen Reactions    Contrast Dye (Echo Or Unknown Ct/Mr) Headache     Had a headache for 9 days     Dust Mite Extract Headache    Grass Headache    Molds & Smuts Headache    Pollen Extract Headache    Hydrocodone Provider Review Needed         Current Outpatient Medications:     albuterol sulfate  (90 Base) MCG/ACT inhaler, Inhale 2 puffs Every 4 (Four) Hours As Needed for Wheezing., Disp: , Rfl:     amLODIPine (NORVASC) 5 MG tablet, Take 1 tablet by mouth 2 (Two) Times a Day., Disp: 60 tablet, Rfl: 5    aspirin 81 MG EC tablet, Take 1 tablet by mouth Daily., Disp: , Rfl:      budesonide-formoterol (SYMBICORT) 160-4.5 MCG/ACT inhaler, INHALE 2 PUFFS TWICE A DAY FOR 30 DAYS, Disp: 10.2 each, Rfl: 2    carboxymethylcellulose (REFRESH PLUS) 0.5 % solution, 3 (Three) Times a Day As Needed for Dry Eyes., Disp: , Rfl:     carvedilol (COREG) 25 MG tablet, Take 1 tablet by mouth 2 (Two) Times a Day With Meals., Disp: 180 tablet, Rfl: 3    clopidogrel (PLAVIX) 75 MG tablet, Take 1 tablet by mouth Daily., Disp: 90 tablet, Rfl: 3    doxepin (SINEquan) 100 MG capsule, Take 1 capsule by mouth every night at bedtime., Disp: , Rfl:     escitalopram (LEXAPRO) 20 MG tablet, TAKE 2 & 1/2 TABLETS BY MOUTH DAILY 90 DAYS, Disp: 225 tablet, Rfl: 0    ezetimibe (ZETIA) 10 MG tablet, Take 1 tablet by mouth Daily., Disp: 90 tablet, Rfl: 3    Farxiga 10 MG tablet, TAKE (1 TABLET )10 MG BY MOUTH DAILY., Disp: 90 tablet, Rfl: 5    gabapentin (NEURONTIN) 400 MG capsule, Take 1 capsule by mouth 3 (Three) Times a Day., Disp: , Rfl:     Gel Base gel, mannitol 20%, capsaicin 0.001%, lidocaine 10%, prilocaine 2% cream, apply cream to affected areas every Q4-6 hoursPRN, Disp: 240 g, Rfl: 5    glucose 4-6 GM-MG per chewable tablet, Chew 2 tablets As Needed for Low Blood Sugar., Disp: , Rfl:     hydrALAZINE (APRESOLINE) 25 MG tablet, Take 1 tablet by mouth 3 (Three) Times a Day. (Patient taking differently: Take 1 tablet by mouth 2 (Two) Times a Day.), Disp: , Rfl:     hydrOXYzine (ATARAX) 25 MG tablet, Take 1 tablet by mouth 1 (One) Time As Needed for Anxiety., Disp: , Rfl:     insulin aspart (novoLOG FLEXPEN) 100 UNIT/ML solution pen-injector sc pen, Inject 14 Units under the skin into the appropriate area as directed 2 (Two) Times a Day With Meals. 16 units in the am, 14 units in the Pm, Disp: , Rfl:     insulin glargine (LANTUS, SEMGLEE) 100 UNIT/ML injection, Inject 34 Units under the skin into the appropriate area as directed Daily. Recently changed to 34u in AM, Disp: , Rfl:     loratadine (CLARITIN) 10 MG tablet,  "Take 1 tablet by mouth Daily., Disp: , Rfl:     LORazepam (ATIVAN) 2 MG tablet, Take 1 tablet by mouth 2 (Two) Times a Day., Disp: , Rfl:     lovastatin (MEVACOR) 40 MG tablet, Take 1 tablet by mouth Every Night., Disp: 30 tablet, Rfl: 5    montelukast (SINGULAIR) 10 MG tablet, Take 1 tablet by mouth Every Night., Disp: , Rfl:     nitroglycerin (NITROSTAT) 0.4 MG SL tablet, Place 1 tablet under the tongue Every 5 (Five) Minutes As Needed for Chest Pain. Take no more than 3 doses in 15 minutes., Disp: , Rfl:     salsalate (DISALCID) 750 MG tablet, Take 1 tablet by mouth 2 (Two) Times a Day., Disp: , Rfl:     spironolactone (ALDACTONE) 25 MG tablet, Take 1 tablet by mouth Daily., Disp: 30 tablet, Rfl: 5    tamsulosin (FLOMAX) 0.4 MG capsule 24 hr capsule, Take 1 capsule by mouth Daily., Disp: , Rfl:     pantoprazole (PROTONIX) 40 MG EC tablet, Take 1 tablet by mouth Daily., Disp: 30 tablet, Rfl: 2    tadalafil (Cialis) 10 MG tablet, Take 1 tablet by mouth Daily As Needed for Erectile Dysfunction for up to 10 days., Disp: 10 tablet, Rfl: 2    The following portions of the patient's history were reviewed and updated as appropriate: allergies, current medications, past family history, past medical history, past social history, past surgical history and problem list.    ROS  Review of Systems   14 point ROS negative except for that listed in the HPI.         Objective:     /72 (BP Location: Left arm, Patient Position: Sitting)   Pulse 81   Ht 188 cm (74\")   Wt 101 kg (223 lb)   SpO2 98%   BMI 28.63 kg/m²      Physical Exam  Constitutional:    Alert, cooperative, in no acute distress   Neck:     No Jugular venous distention, adenopathy, or thyromegaly     noted.     Heart:    Regular rhythm and normal rate, normal S1 and S2, no murmurs,gallops, rubs, or clicks. No distinct PMI noted.    Lungs:     Clear to auscultation bilaterally, respirations regular, even     and unlabored    Extremities:   No gross " deformities, no edema, clubbing, or cyanosis.      Data Review:   Lab Results   Component Value Date    GLUCOSE 161 (H) 03/15/2024    BUN 9 03/15/2024    CREATININE 0.77 03/15/2024    EGFR 94.5 03/15/2024    BCR 11.7 03/15/2024     03/15/2024    K 3.1 (L) 03/15/2024    CO2 26.3 03/15/2024    CALCIUM 8.7 03/15/2024    ALBUMIN 4.4 03/15/2024    AST 11 03/15/2024    ALT 15 03/15/2024     Lab Results   Component Value Date    CHOL 152 03/15/2024    TRIG 96 03/15/2024    HDL 47 03/15/2024    LDL 87 03/15/2024      Lab Results   Component Value Date    WBC 7.05 03/15/2024    RBC 5.46 03/15/2024    HGB 16.1 03/15/2024    HCT 47.7 03/15/2024    MCV 87.4 03/15/2024     03/15/2024     Lab Results   Component Value Date    TSH 4.010 03/15/2024     Lab Results   Component Value Date    HGBA1C 7.7 (A) 03/15/2024        Procedures       Advance Care Planning   ACP discussion was held with the patient during this visit. Patient does not have an advance directive, declines further assistance.           Assessment:      Diagnosis Plan   1. Coronary artery disease involving native coronary artery of native heart without angina pectoris  clopidogrel (PLAVIX) 75 MG tablet      2. Autonomic orthostatic hypotension  Stress Test With Myocardial Perfusion One Day      3. Essential hypertension  Stress Test With Myocardial Perfusion One Day      4. Dyslipidemia  Stress Test With Myocardial Perfusion One Day      5. Other hyperlipidemia  lovastatin (MEVACOR) 40 MG tablet      6. Coronary artery disease of native artery of native heart with stable angina pectoris  Stress Test With Myocardial Perfusion One Day        Plan:   Overall stable cardiac status. Discussed his burning chest pain is likely associated with GERD as it resolves with antacids and is associated with certain food intake, however will obtain stress MPS for screening. Change Omeprazole to Protonix to see if this helps.     2.   Increase Amlodipine to 5mg BID for  better BP control, with close attention to orthostatic symptoms. If he has recurrent dizziness/pre-syncope or syncope, he should cut back to 5mg daily    3. Decrease caffeine intake, and continue adequate hydration.     4.  Continue all other current medications.  FU in 3 MO, sooner as needed.      Thank you for allowing us to participate in the care of your patient.       Electronically signed by Alejandrina Thorpe PA-C, 12/03/24, 1:59 PM EST.

## 2024-12-03 ENCOUNTER — OFFICE VISIT (OUTPATIENT)
Age: 74
End: 2024-12-03
Payer: MEDICARE

## 2024-12-03 VITALS
SYSTOLIC BLOOD PRESSURE: 162 MMHG | DIASTOLIC BLOOD PRESSURE: 72 MMHG | OXYGEN SATURATION: 98 % | WEIGHT: 223 LBS | HEIGHT: 74 IN | BODY MASS INDEX: 28.62 KG/M2 | HEART RATE: 81 BPM

## 2024-12-03 DIAGNOSIS — I25.118 CORONARY ARTERY DISEASE OF NATIVE ARTERY OF NATIVE HEART WITH STABLE ANGINA PECTORIS: ICD-10-CM

## 2024-12-03 DIAGNOSIS — I25.10 CORONARY ARTERY DISEASE INVOLVING NATIVE CORONARY ARTERY OF NATIVE HEART WITHOUT ANGINA PECTORIS: Primary | ICD-10-CM

## 2024-12-03 DIAGNOSIS — I95.1 AUTONOMIC ORTHOSTATIC HYPOTENSION: ICD-10-CM

## 2024-12-03 DIAGNOSIS — E78.49 OTHER HYPERLIPIDEMIA: ICD-10-CM

## 2024-12-03 DIAGNOSIS — E78.5 DYSLIPIDEMIA: ICD-10-CM

## 2024-12-03 DIAGNOSIS — I10 ESSENTIAL HYPERTENSION: ICD-10-CM

## 2024-12-03 RX ORDER — CARVEDILOL 25 MG/1
25 TABLET ORAL 2 TIMES DAILY WITH MEALS
Qty: 180 TABLET | Refills: 3 | Status: SHIPPED | OUTPATIENT
Start: 2024-12-03

## 2024-12-03 RX ORDER — SPIRONOLACTONE 25 MG/1
25 TABLET ORAL DAILY
COMMUNITY
End: 2024-12-03 | Stop reason: SDUPTHER

## 2024-12-03 RX ORDER — CLOPIDOGREL BISULFATE 75 MG/1
75 TABLET ORAL DAILY
Qty: 90 TABLET | Refills: 3 | Status: SHIPPED | OUTPATIENT
Start: 2024-12-03

## 2024-12-03 RX ORDER — PANTOPRAZOLE SODIUM 40 MG/1
40 TABLET, DELAYED RELEASE ORAL DAILY
Qty: 30 TABLET | Refills: 2 | Status: SHIPPED | OUTPATIENT
Start: 2024-12-03

## 2024-12-03 RX ORDER — AMLODIPINE BESYLATE 5 MG/1
5 TABLET ORAL 2 TIMES DAILY
Qty: 60 TABLET | Refills: 5 | Status: SHIPPED | OUTPATIENT
Start: 2024-12-03

## 2024-12-03 RX ORDER — LOVASTATIN 40 MG/1
40 TABLET ORAL NIGHTLY
Qty: 30 TABLET | Refills: 5 | Status: SHIPPED | OUTPATIENT
Start: 2024-12-03

## 2024-12-03 RX ORDER — SPIRONOLACTONE 25 MG/1
25 TABLET ORAL DAILY
Qty: 30 TABLET | Refills: 5 | Status: SHIPPED | OUTPATIENT
Start: 2024-12-03

## 2024-12-03 RX ORDER — EZETIMIBE 10 MG/1
10 TABLET ORAL DAILY
Qty: 90 TABLET | Refills: 3 | Status: SHIPPED | OUTPATIENT
Start: 2024-12-03

## 2024-12-18 ENCOUNTER — HOSPITAL ENCOUNTER (OUTPATIENT)
Dept: CARDIOLOGY | Facility: HOSPITAL | Age: 74
Discharge: HOME | End: 2024-12-18
Payer: MEDICARE

## 2024-12-18 VITALS — WEIGHT: 222.66 LBS | HEIGHT: 74 IN | BODY MASS INDEX: 28.58 KG/M2

## 2024-12-18 DIAGNOSIS — I95.1 AUTONOMIC ORTHOSTATIC HYPOTENSION: ICD-10-CM

## 2024-12-18 DIAGNOSIS — I10 ESSENTIAL HYPERTENSION: ICD-10-CM

## 2024-12-18 DIAGNOSIS — E78.5 DYSLIPIDEMIA: ICD-10-CM

## 2024-12-18 DIAGNOSIS — I25.118 CORONARY ARTERY DISEASE OF NATIVE ARTERY OF NATIVE HEART WITH STABLE ANGINA PECTORIS: ICD-10-CM

## 2024-12-18 LAB
BH CV STRESS BP STAGE 2: NORMAL
BH CV STRESS BP STAGE 4: NORMAL
BH CV STRESS COMMENTS STAGE 1: NORMAL
BH CV STRESS DOSE REGADENOSON STAGE 1: 0.4
BH CV STRESS DURATION MIN STAGE 1: 1
BH CV STRESS DURATION MIN STAGE 2: 1
BH CV STRESS DURATION MIN STAGE 3: 1
BH CV STRESS DURATION MIN STAGE 4: 1
BH CV STRESS DURATION SEC STAGE 1: 0
BH CV STRESS DURATION SEC STAGE 2: 0
BH CV STRESS DURATION SEC STAGE 3: 0
BH CV STRESS DURATION SEC STAGE 4: 0
BH CV STRESS HR STAGE 1: 69
BH CV STRESS HR STAGE 2: 91
BH CV STRESS HR STAGE 3: 91
BH CV STRESS HR STAGE 4: 90
BH CV STRESS O2 STAGE 1: 95
BH CV STRESS O2 STAGE 2: 98
BH CV STRESS O2 STAGE 3: 98
BH CV STRESS O2 STAGE 4: 98
BH CV STRESS PROTOCOL 1: NORMAL
BH CV STRESS RECOVERY BP: NORMAL MMHG
BH CV STRESS RECOVERY HR: 86 BPM
BH CV STRESS RECOVERY O2: 98 %
BH CV STRESS STAGE 1: 1
BH CV STRESS STAGE 2: 2
BH CV STRESS STAGE 3: 3
BH CV STRESS STAGE 4: 4
MAXIMAL PREDICTED HEART RATE: 146 BPM
PERCENT MAX PREDICTED HR: 63.7 %
STRESS BASELINE BP: NORMAL MMHG
STRESS BASELINE HR: 78 BPM
STRESS O2 SAT REST: 97 %
STRESS PERCENT HR: 75 %
STRESS POST ESTIMATED WORKLOAD: 1 METS
STRESS POST EXERCISE DUR MIN: 4 MIN
STRESS POST EXERCISE DUR SEC: 0 SEC
STRESS POST O2 SAT PEAK: 98 %
STRESS POST PEAK BP: NORMAL MMHG
STRESS POST PEAK HR: 93 BPM
STRESS TARGET HR: 124 BPM

## 2024-12-18 PROCEDURE — 34310000005 TECHNETIUM SESTAMIBI: Performed by: FAMILY MEDICINE

## 2024-12-18 PROCEDURE — 78452 HT MUSCLE IMAGE SPECT MULT: CPT

## 2024-12-18 PROCEDURE — 93017 CV STRESS TEST TRACING ONLY: CPT

## 2024-12-18 PROCEDURE — A9500 TC99M SESTAMIBI: HCPCS | Performed by: FAMILY MEDICINE

## 2024-12-18 PROCEDURE — 25010000002 REGADENOSON 0.4 MG/5ML SOLUTION: Performed by: INTERNAL MEDICINE

## 2024-12-18 RX ORDER — REGADENOSON 0.08 MG/ML
0.4 INJECTION, SOLUTION INTRAVENOUS ONCE
Status: COMPLETED | OUTPATIENT
Start: 2024-12-18 | End: 2024-12-18

## 2024-12-18 RX ADMIN — TECHNETIUM TC 99M SESTAMIBI 1 DOSE: 1 INJECTION INTRAVENOUS at 08:32

## 2024-12-18 RX ADMIN — TECHNETIUM TC 99M SESTAMIBI 1 DOSE: 1 INJECTION INTRAVENOUS at 10:36

## 2024-12-18 RX ADMIN — REGADENOSON 0.4 MG: 0.08 INJECTION, SOLUTION INTRAVENOUS at 10:36

## 2024-12-23 ENCOUNTER — HOSPITAL ENCOUNTER (OUTPATIENT)
Dept: CARDIOLOGY | Facility: HOSPITAL | Age: 74
Discharge: HOME | End: 2024-12-23
Payer: MEDICARE

## 2024-12-23 DIAGNOSIS — I25.118 CORONARY ARTERY DISEASE OF NATIVE ARTERY OF NATIVE HEART WITH STABLE ANGINA PECTORIS: Primary | ICD-10-CM

## 2024-12-23 DIAGNOSIS — I25.118 CORONARY ARTERY DISEASE OF NATIVE ARTERY OF NATIVE HEART WITH STABLE ANGINA PECTORIS: ICD-10-CM

## 2025-01-07 RX ORDER — HYDRALAZINE HYDROCHLORIDE 50 MG/1
50 TABLET, FILM COATED ORAL 3 TIMES DAILY
Qty: 270 TABLET | Refills: 2 | OUTPATIENT
Start: 2025-01-07

## 2025-04-29 NOTE — PROGRESS NOTES
[facility] Cardiology    Encounter Date: 2025    Patient ID: Panda Henry is a 74 y.o. male.  : 1950     PCP: Alberto Green MD       Chief Complaint: Coronary Artery Disease (Coronary artery disease involving native coronary artery of native heart without angina pectoris) and Shortness of Breath      PROBLEM LIST:  Coronary artery disease  Abnormal stress test followed by left heart catheterization with stent placement to the LAD 2019-data deficit ([facility])  CCS class I-II atypical chest discomfort/NYHA class I-II dyspnea on exertion symptoms  Cardiac PET 8/3/2020: Acceptable negative IV Lexiscan hybrid PET cardiac CT stress scan studies suggestive of low probability for significant focal obstructive CAD with preserved systolic LV function (LVEF 0.54)  Echo, 2022; EF 56-60%. Mild concentric LVH. Grade I diastolic dysfunction with high LAP. RA cavity is mildly dilated.  Lexiscan, 10/25/2022; EF 50%. Scintigraphy demonstrates a fixed inferior wall defect compatible with infarct or diaphragmatic attenuation. Additionally there is a moderately sized, moderately dense but incompletely reversible inferolateral wall defect compatible with ischemia. Elevated transient ischemic dilation ratio 1.26 possibly representing multivessel coronary disease. No evidence of increased lung uptake of radiopharmaceutical.  C, 2022; EF 65%. 75 to 80% stenosis of the ostial/proximal LAD with abnormal IFR.  This vessel is now status post successful stenting with 3.5 x 28 mm JOSE E reducing the stenosis to no significant residual disease and improvement of IFR. Patent stent of mid LAD with nonobstructive 20 to 30% plaque. 90% stenosis of the ostial/proximal ramus intermedius.  This vessel is now status post JOSE E with 2.25 x 28 mm JOSE E reducing the stenosis to 0%. No significant disease of the codominant left circumflex coronary artery and the codominant right  coronary artery.   Echo, 03/08/2024: EF 56-60%. Trace MR. Physiological TR.   Syncope  30 day MCOT, 12/29/2022; SR. Rare APCs and PVCs. No significant abnormalities.    Holter, 06/14/2024: Sinus rhythm with bundle branch block. Rare PACs and PVCs. Single episode of PAT/SVT lasting 15.6 seconds. No symptoms reported.  Lower extremity weakness  Hypertension  Hyperlipidemia  Type 2 diabetes mellitus; hemoglobin A1c 7.9% August 2020  GERD  Obstructive sleep apnea, compliant with CPAP  Orthostatic dizziness/syncope  Mild obesity: BMI 30.37  Depression  COPD  Chronic back pain  Smokeless tobacco use; 1 stick every 3 days  History of basal cell carcinoma  History of rheumatic fever  Surgical history:  Blepharoplasty bilateral  LHC  Right rotator cuff repair  Sinus plasty  Basal cell carcinoma skin excision    History of Present Illness  Patient presents today for a follow-up with a history of CAD, autonomic orthostatic hypotension, and cardiac risk factors. Since last visit, he has continued to have shortness of breath with exertion. She states when he gets short of breath it affects both of his shoulders and his back. A stress test was ordered at his last visit and there were technical difficulties and both of his appointments and he was not rescheduled. Patient denies any palpitations, orthopnea, edema, presyncope or syncope. Does have some lightheadedness the mornings. Is hypertensive but with medications BP drops and ultimately resulted in hip fracture.       Allergies   Allergen Reactions    Contrast Dye (Echo Or Unknown Ct/Mr) Headache     Had a headache for 9 days     Dust Mite Extract Headache    Grass Headache    Molds & Smuts Headache    Pollen Extract Headache    Hydrocodone Provider Review Needed         Current Outpatient Medications:     albuterol sulfate  (90 Base) MCG/ACT inhaler, Inhale 2 puffs Every 4 (Four) Hours As Needed for Wheezing., Disp: , Rfl:     amLODIPine (NORVASC) 5 MG tablet, Take 1  tablet by mouth 2 (Two) Times a Day., Disp: 60 tablet, Rfl: 5    aspirin 81 MG EC tablet, Take 1 tablet by mouth Daily., Disp: , Rfl:     budesonide-formoterol (SYMBICORT) 160-4.5 MCG/ACT inhaler, INHALE 2 PUFFS TWICE A DAY FOR 30 DAYS, Disp: 10.2 each, Rfl: 2    carboxymethylcellulose (REFRESH PLUS) 0.5 % solution, 3 (Three) Times a Day As Needed for Dry Eyes., Disp: , Rfl:     clopidogrel (PLAVIX) 75 MG tablet, Take 1 tablet by mouth Daily., Disp: 90 tablet, Rfl: 3    doxepin (SINEquan) 100 MG capsule, Take 1 capsule by mouth every night at bedtime., Disp: , Rfl:     escitalopram (LEXAPRO) 20 MG tablet, TAKE 2 & 1/2 TABLETS BY MOUTH DAILY 90 DAYS, Disp: 225 tablet, Rfl: 0    ezetimibe (ZETIA) 10 MG tablet, Take 1 tablet by mouth Daily., Disp: 90 tablet, Rfl: 3    gabapentin (NEURONTIN) 400 MG capsule, Take 1 capsule by mouth 3 (Three) Times a Day., Disp: , Rfl:     Gel Base gel, mannitol 20%, capsaicin 0.001%, lidocaine 10%, prilocaine 2% cream, apply cream to affected areas every Q4-6 hoursPRN, Disp: 240 g, Rfl: 5    glucose 4-6 GM-MG per chewable tablet, Chew 2 tablets As Needed for Low Blood Sugar., Disp: , Rfl:     hydrALAZINE (APRESOLINE) 25 MG tablet, Take 1 tablet by mouth 3 (Three) Times a Day. (Patient taking differently: Take 1 tablet by mouth 2 (Two) Times a Day.), Disp: , Rfl:     hydrOXYzine (ATARAX) 25 MG tablet, Take 1 tablet by mouth 1 (One) Time As Needed for Anxiety., Disp: , Rfl:     insulin aspart (novoLOG FLEXPEN) 100 UNIT/ML solution pen-injector sc pen, Inject 14 Units under the skin into the appropriate area as directed 2 (Two) Times a Day With Meals. 16 units in the am, 14 units in the Pm, Disp: , Rfl:     insulin glargine (LANTUS, SEMGLEE) 100 UNIT/ML injection, Inject 34 Units under the skin into the appropriate area as directed Daily. Recently changed to 34u in AM, Disp: , Rfl:     LORazepam (ATIVAN) 2 MG tablet, Take 0.5 tablets by mouth 3 (Three) Times a Day., Disp: , Rfl:      lovastatin (MEVACOR) 40 MG tablet, Take 1 tablet by mouth Every Night., Disp: 30 tablet, Rfl: 5    methocarbamol (ROBAXIN) 750 MG tablet, Take 1 tablet by mouth Every 8 (Eight) Hours As Needed., Disp: , Rfl:     montelukast (SINGULAIR) 10 MG tablet, Take 1 tablet by mouth Every Night., Disp: , Rfl:     nitroglycerin (NITROSTAT) 0.4 MG SL tablet, Place 1 tablet under the tongue Every 5 (Five) Minutes As Needed for Chest Pain. Take no more than 3 doses in 15 minutes., Disp: , Rfl:     pantoprazole (PROTONIX) 40 MG EC tablet, Take 1 tablet by mouth Daily., Disp: 30 tablet, Rfl: 2    spironolactone (ALDACTONE) 25 MG tablet, Take 1 tablet by mouth Daily., Disp: 30 tablet, Rfl: 5    tamsulosin (FLOMAX) 0.4 MG capsule 24 hr capsule, Take 1 capsule by mouth Daily., Disp: , Rfl:     vitamin D (ERGOCALCIFEROL) 1.25 MG (84918 UT) capsule capsule, Take 1 capsule every week by oral route for 30 days., Disp: , Rfl:     carvedilol (COREG) 25 MG tablet, Take 1 tablet by mouth 2 (Two) Times a Day With Meals. (Patient not taking: Reported on 5/6/2025), Disp: 180 tablet, Rfl: 3    Farxiga 10 MG tablet, TAKE (1 TABLET )10 MG BY MOUTH DAILY. (Patient not taking: Reported on 5/6/2025), Disp: 90 tablet, Rfl: 5    loratadine (CLARITIN) 10 MG tablet, Take 1 tablet by mouth Daily. (Patient not taking: Reported on 5/6/2025), Disp: , Rfl:     salsalate (DISALCID) 750 MG tablet, Take 1 tablet by mouth 2 (Two) Times a Day. (Patient not taking: Reported on 5/6/2025), Disp: , Rfl:     tadalafil (Cialis) 10 MG tablet, Take 1 tablet by mouth Daily As Needed for Erectile Dysfunction for up to 10 days. (Patient not taking: Reported on 5/6/2025), Disp: 10 tablet, Rfl: 2    The following portions of the patient's history were reviewed and updated as appropriate: allergies, current medications, past family history, past medical history, past social history, past surgical history and problem list.    ROS  Review of Systems   14 point ROS negative except  "for that listed in the HPI.         Objective:     /82 (BP Location: Left arm, Patient Position: Sitting)   Pulse 104   Ht 185.4 cm (73\")   Wt 100 kg (221 lb)   SpO2 97%   BMI 29.16 kg/m²      Physical Exam  Constitutional: Patient appears well-developed and well-nourished.   Neck: Neck supple. No JVD present.   Cardiovascular: Normal rate, regular rhythm and normal heart sounds. No murmur heard.   2+ symmetric pulses.   Pulmonary/Chest: Breath sounds normal. Does not exhibit tenderness.   Musculoskeletal: Does not exhibit edema.   Vitals reviewed.    Data Review:   Lab Results   Component Value Date    GLUCOSE 161 (H) 03/15/2024    BUN 9 03/15/2024    CREATININE 0.77 03/15/2024    EGFR 94.5 03/15/2024    BCR 11.7 03/15/2024     03/15/2024    K 3.1 (L) 03/15/2024    CO2 26.3 03/15/2024    CALCIUM 8.7 03/15/2024    ALBUMIN 4.4 03/15/2024    AST 11 03/15/2024    ALT 15 03/15/2024     Lab Results   Component Value Date    CHOL 152 03/15/2024    TRIG 96 03/15/2024    HDL 47 03/15/2024    LDL 87 03/15/2024      Lab Results   Component Value Date    WBC 7.05 03/15/2024    RBC 5.46 03/15/2024    HGB 16.1 03/15/2024    HCT 47.7 03/15/2024    MCV 87.4 03/15/2024     03/15/2024     Lab Results   Component Value Date    TSH 4.010 03/15/2024     Lab Results   Component Value Date    HGBA1C 7.7 (A) 03/15/2024        Procedures     Advance Care Planning   ACP discussion was held with the patient during this visit. Patient has an advance directive in EMR which is still valid.            Assessment and plan:      Diagnosis Plan   1. Coronary artery disease of native artery of native heart with stable angina pectoris  Myocardial perfusion study ordered.  Have reached out to scheduling he will get the patient scheduled ASAP due to technical difficulties during his last 2 attempts to obtain stress test.    He has chest pain not relieved with rest or sublingual nitroglycerin he will report the emergency " department.      2. Autonomic orthostatic hypotension  Improved.  Maintain adequate hydration.      3. Essential hypertension  Controlled with current medical therapy.  Will not adjust due to history of orthostatic hypotension with near syncope.      4. Dyslipidemia  Continue lovastatin 40 mg daily.          Continue current medications.   FU in 4 MO, sooner as needed.  Thank you for allowing us to participate in the care of your patient.         Symone Jacobo PA-C      Please note that portions of this note may have been completed with a voice recognition program. Efforts were made to edit the dictations, but occasionally words are mistranscribed.

## 2025-05-06 ENCOUNTER — OFFICE VISIT (OUTPATIENT)
Age: 75
End: 2025-05-06
Payer: MEDICARE

## 2025-05-06 VITALS
BODY MASS INDEX: 29.29 KG/M2 | HEIGHT: 73 IN | WEIGHT: 221 LBS | DIASTOLIC BLOOD PRESSURE: 82 MMHG | OXYGEN SATURATION: 97 % | SYSTOLIC BLOOD PRESSURE: 138 MMHG | HEART RATE: 104 BPM

## 2025-05-06 DIAGNOSIS — I10 ESSENTIAL HYPERTENSION: ICD-10-CM

## 2025-05-06 DIAGNOSIS — I25.118 CORONARY ARTERY DISEASE OF NATIVE ARTERY OF NATIVE HEART WITH STABLE ANGINA PECTORIS: Primary | ICD-10-CM

## 2025-05-06 DIAGNOSIS — I95.1 AUTONOMIC ORTHOSTATIC HYPOTENSION: ICD-10-CM

## 2025-05-06 DIAGNOSIS — E78.5 DYSLIPIDEMIA: ICD-10-CM

## 2025-05-06 RX ORDER — METHOCARBAMOL 750 MG/1
750 TABLET, FILM COATED ORAL EVERY 8 HOURS PRN
COMMUNITY

## 2025-05-06 RX ORDER — ERGOCALCIFEROL 1.25 MG/1
CAPSULE, LIQUID FILLED ORAL
COMMUNITY
Start: 2025-02-28

## 2025-06-19 ENCOUNTER — HOSPITAL ENCOUNTER (OUTPATIENT)
Facility: HOSPITAL | Age: 75
Discharge: HOME | End: 2025-06-19
Payer: MEDICARE

## 2025-06-19 DIAGNOSIS — I25.118 CORONARY ARTERY DISEASE OF NATIVE ARTERY OF NATIVE HEART WITH STABLE ANGINA PECTORIS: ICD-10-CM

## 2025-06-19 PROCEDURE — 78452 HT MUSCLE IMAGE SPECT MULT: CPT

## 2025-06-19 PROCEDURE — 34310000005 TECHNETIUM SESTAMIBI

## 2025-06-19 PROCEDURE — A9500 TC99M SESTAMIBI: HCPCS

## 2025-06-19 PROCEDURE — 93017 CV STRESS TEST TRACING ONLY: CPT

## 2025-06-19 PROCEDURE — 25010000002 REGADENOSON 0.4 MG/5ML SOLUTION

## 2025-06-19 RX ORDER — REGADENOSON 0.08 MG/ML
0.4 INJECTION, SOLUTION INTRAVENOUS
Status: COMPLETED | OUTPATIENT
Start: 2025-06-19 | End: 2025-06-19

## 2025-06-19 RX ADMIN — TECHNETIUM TC 99M SESTAMIBI 1 DOSE: 1 INJECTION INTRAVENOUS at 12:35

## 2025-06-19 RX ADMIN — TECHNETIUM TC 99M SESTAMIBI 1 DOSE: 1 INJECTION INTRAVENOUS at 14:25

## 2025-06-19 RX ADMIN — REGADENOSON 0.4 MG: 0.08 INJECTION INTRAVENOUS at 14:26

## 2025-06-20 ENCOUNTER — RESULTS FOLLOW-UP (OUTPATIENT)
Age: 75
End: 2025-06-20
Payer: MEDICARE

## 2025-06-20 DIAGNOSIS — E78.49 OTHER HYPERLIPIDEMIA: ICD-10-CM

## 2025-06-20 LAB
BH CV REST NUCLEAR ISOTOPE DOSE: 9.2 MCI
BH CV STRESS BP STAGE 2: NORMAL
BH CV STRESS BP STAGE 3: NORMAL
BH CV STRESS BP STAGE 4: NORMAL
BH CV STRESS COMMENTS STAGE 1: NORMAL
BH CV STRESS DOSE REGADENOSON STAGE 1: 0.4
BH CV STRESS DURATION MIN STAGE 1: 1
BH CV STRESS DURATION MIN STAGE 2: 1
BH CV STRESS DURATION MIN STAGE 3: 1
BH CV STRESS DURATION MIN STAGE 4: 1
BH CV STRESS DURATION SEC STAGE 1: 0
BH CV STRESS DURATION SEC STAGE 2: 0
BH CV STRESS DURATION SEC STAGE 3: 0
BH CV STRESS DURATION SEC STAGE 4: 0
BH CV STRESS HR STAGE 1: 74
BH CV STRESS HR STAGE 2: 90
BH CV STRESS HR STAGE 3: 89
BH CV STRESS HR STAGE 4: 88
BH CV STRESS NUCLEAR ISOTOPE DOSE: 29.1 MCI
BH CV STRESS O2 STAGE 1: 98
BH CV STRESS O2 STAGE 2: 98
BH CV STRESS O2 STAGE 3: 98
BH CV STRESS O2 STAGE 4: 98
BH CV STRESS PROTOCOL 1: NORMAL
BH CV STRESS RECOVERY BP: NORMAL MMHG
BH CV STRESS RECOVERY HR: 83 BPM
BH CV STRESS RECOVERY O2: 98 %
BH CV STRESS STAGE 1: 1
BH CV STRESS STAGE 2: 2
BH CV STRESS STAGE 3: 3
BH CV STRESS STAGE 4: 4
MAXIMAL PREDICTED HEART RATE: 145 BPM
PERCENT MAX PREDICTED HR: 62.07 %
SPECT HRT GATED+EF W RNC IV: 49 %
STRESS BASELINE BP: NORMAL MMHG
STRESS BASELINE HR: 78 BPM
STRESS O2 SAT REST: 99 %
STRESS PERCENT HR: 73 %
STRESS POST ESTIMATED WORKLOAD: 1 METS
STRESS POST EXERCISE DUR MIN: 4 MIN
STRESS POST EXERCISE DUR SEC: 0 SEC
STRESS POST O2 SAT PEAK: 98 %
STRESS POST PEAK BP: NORMAL MMHG
STRESS POST PEAK HR: 90 BPM
STRESS TARGET HR: 123 BPM

## 2025-06-20 NOTE — TELEPHONE ENCOUNTER
Spoke with pt concerning Marya's recommendation for heart cath. Pt asking if something was found on his stress test that shows something is wrong with the previous stents he had placed? Will ask provider for further guidance and will f/u with pt next week. He voiced understanding.

## 2025-06-20 NOTE — TELEPHONE ENCOUNTER
----- Message from Symone Jacobo sent at 6/20/2025  9:17 AM EDT -----  This aixa is still having dyspnea on exertion/chest pain/arm pain, he needs to be set up for cardiac catheterization.  Thank you.  ----- Message -----  From: Moisés Bansal MD  Sent: 6/20/2025   7:32 AM EDT  To: Symone Jacobo PA-C

## 2025-07-15 RX ORDER — LOVASTATIN 40 MG/1
40 TABLET ORAL NIGHTLY
Qty: 90 TABLET | Refills: 3 | Status: SHIPPED | OUTPATIENT
Start: 2025-07-15

## 2025-08-18 ENCOUNTER — TELEPHONE (OUTPATIENT)
Dept: CARDIOLOGY | Facility: CLINIC | Age: 75
End: 2025-08-18
Payer: MEDICARE

## (undated) DEVICE — BLANKT WARM LOWR/BDY 100X120CM

## (undated) DEVICE — NEEDLE, QUINCKE, 20GX3.5": Brand: MEDLINE

## (undated) DEVICE — INTRO SHEATH PRELUDE IDEAL SPRNG COIL 021 6F 23X80CM

## (undated) DEVICE — SNAP KOVER: Brand: UNBRANDED

## (undated) DEVICE — ADHS SKIN PREMIERPRO EXOFIN TOPICAL HI/VISC .5ML

## (undated) DEVICE — INSTRUMENT 7080902 PLATE HOLDING PIN

## (undated) DEVICE — MINI TREK CORONARY DILATATION CATHETER 2.0 MM X 12 MM / RAPID-EXCHANGE: Brand: MINI TREK

## (undated) DEVICE — PK NEURO DISC 10

## (undated) DEVICE — HI-TORQUE VERSATURN F GUIDE WIRE FULLY COATED .014 STRAIGHT TIP 190 CM: Brand: HI-TORQUE VERSATURN

## (undated) DEVICE — ANTIBACTERIAL UNDYED BRAIDED (POLYGLACTIN 910), SYNTHETIC ABSORBABLE SUTURE: Brand: COATED VICRYL

## (undated) DEVICE — MODEL BT2000 P/N 700287-012KIT CONTENTS: MANIFOLD WITH SALINE AND CONTRAST PORTS, SALINE TUBING WITH SPIKE AND HAND SYRINGE, TRANSDUCER: Brand: BT2000 AUTOMATED MANIFOLD KIT

## (undated) DEVICE — SPONGE,DISSECTOR,K,XRAY,9/16"X1/4",STRL: Brand: MEDLINE

## (undated) DEVICE — Device

## (undated) DEVICE — GLV SURG PREMIERPRO MIC LTX PF SZ6.5 BRN

## (undated) DEVICE — STRAP POSTN KN/BDY FM 5X72IN DISP

## (undated) DEVICE — PCH INST SURG INVISISHIELD 2PCKT

## (undated) DEVICE — PATIENT RETURN ELECTRODE, SINGLE-USE, CONTACT QUALITY MONITORING, ADULT, WITH 9FT CORD, FOR PATIENTS WEIGING OVER 33LBS. (15KG): Brand: MEGADYNE

## (undated) DEVICE — SUT VIC PLS CTD ANTIB BR 3/0 8/18IN 45CM

## (undated) DEVICE — GW INQWIRE FC PTFE STD J/1.5 .035 260

## (undated) DEVICE — DRP MICROSCOPE 4 BINOCULAR CV 54X150IN

## (undated) DEVICE — PK CATH CARD 10

## (undated) DEVICE — CATH DIAG EXPO .045 FL3  5F 100CM

## (undated) DEVICE — CATH DIAG EXPO M/ PK 5F FL4/FR4 PIG

## (undated) DEVICE — SUT SILK 2/0 TIES 18IN A185H

## (undated) DEVICE — INSTRUMENT 875-088 14MM DSTRCT PIN STRL: Brand: MEDTRONIC REUSABLE INSTRUMENT

## (undated) DEVICE — MINI TREK CORONARY DILATATION CATHETER 1.50 MM X 12 MM / RAPID-EXCHANGE: Brand: MINI TREK

## (undated) DEVICE — GLV SURG PREMIERPRO MIC LTX PF SZ7.5 BRN

## (undated) DEVICE — PENCL ROCKRSWCH MEGADYNE W/HOLSTR 10FT SS

## (undated) DEVICE — DRILL BIT 7080513 STERILE 13MM

## (undated) DEVICE — TOOL MR8-12BA30 MR8 12CM BALL 3MM DIAM: Brand: MIDAS REX MR8

## (undated) DEVICE — ADULT, W/LG. BACK PAD, RADIOTRANSPARENT ELEMENT AND LEAD WIRE: Brand: DEFIBRILLATION ELECTRODES

## (undated) DEVICE — DEV COMP RAD PRELUDESYNC 29CM

## (undated) DEVICE — GUIDE CATHETER: Brand: MACH1™

## (undated) DEVICE — ELECTRD BLD EZ CLN MOD XLNG 2.75IN

## (undated) DEVICE — BLD KNIF KARLIN 46 3178

## (undated) DEVICE — BANDAGE,GAUZE,BULKEE II,4.5"X4.1YD,STRL: Brand: MEDLINE

## (undated) DEVICE — DEV INFL MONARCH 25W

## (undated) DEVICE — MODEL AT P65, P/N 701554-001KIT CONTENTS: HAND CONTROLLER, 3-WAY HIGH-PRESSURE STOPCOCK WITH ROTATING END AND PREMIUM HIGH-PRESSURE TUBING: Brand: ANGIOTOUCH® KIT